# Patient Record
Sex: MALE | Race: WHITE | NOT HISPANIC OR LATINO | Employment: FULL TIME | URBAN - METROPOLITAN AREA
[De-identification: names, ages, dates, MRNs, and addresses within clinical notes are randomized per-mention and may not be internally consistent; named-entity substitution may affect disease eponyms.]

---

## 2017-01-27 ENCOUNTER — TRANSCRIBE ORDERS (OUTPATIENT)
Dept: ADMINISTRATIVE | Facility: HOSPITAL | Age: 67
End: 2017-01-27

## 2017-01-27 DIAGNOSIS — M54.2 NECK PAIN: Primary | ICD-10-CM

## 2017-01-27 DIAGNOSIS — R51.9 SEVERE FRONTAL HEADACHES: Primary | ICD-10-CM

## 2017-02-02 ENCOUNTER — HOSPITAL ENCOUNTER (OUTPATIENT)
Dept: CT IMAGING | Facility: HOSPITAL | Age: 67
Discharge: HOME | End: 2017-02-02
Admitting: INTERNAL MEDICINE

## 2017-02-02 ENCOUNTER — HOSPITAL ENCOUNTER (OUTPATIENT)
Dept: INFUSION THERAPY | Facility: HOSPITAL | Age: 67
End: 2017-02-02

## 2017-02-02 DIAGNOSIS — M54.2 NECK PAIN: ICD-10-CM

## 2017-02-02 DIAGNOSIS — R51.9 SEVERE FRONTAL HEADACHES: ICD-10-CM

## 2017-02-02 DIAGNOSIS — T78.40XA ALLERGIC REACTION, INITIAL ENCOUNTER: Primary | ICD-10-CM

## 2017-02-02 PROCEDURE — 0 IOPAMIDOL PER 1 ML: Performed by: INTERNAL MEDICINE

## 2017-02-02 PROCEDURE — 70491 CT SOFT TISSUE NECK W/DYE: CPT

## 2017-02-02 PROCEDURE — 70470 CT HEAD/BRAIN W/O & W/DYE: CPT

## 2017-02-02 PROCEDURE — 96374 THER/PROPH/DIAG INJ IV PUSH: CPT

## 2017-02-02 PROCEDURE — 25010000002 DIPHENHYDRAMINE PER 50 MG: Performed by: RADIOLOGY

## 2017-02-02 RX ADMIN — IOPAMIDOL 100 ML: 755 INJECTION, SOLUTION INTRAVENOUS at 15:30

## 2017-02-02 RX ADMIN — DIPHENHYDRAMINE HYDROCHLORIDE 50 MG: 50 INJECTION INTRAMUSCULAR; INTRAVENOUS at 15:03

## 2017-02-12 RX ORDER — ISOSORBIDE MONONITRATE 30 MG/1
TABLET, EXTENDED RELEASE ORAL
Qty: 90 TABLET | Refills: 0 | Status: SHIPPED | OUTPATIENT
Start: 2017-02-12 | End: 2017-05-12 | Stop reason: SDUPTHER

## 2017-03-20 ENCOUNTER — OFFICE VISIT (OUTPATIENT)
Dept: CARDIOLOGY | Facility: CLINIC | Age: 67
End: 2017-03-20

## 2017-03-20 VITALS
HEART RATE: 90 BPM | DIASTOLIC BLOOD PRESSURE: 70 MMHG | WEIGHT: 200.2 LBS | SYSTOLIC BLOOD PRESSURE: 120 MMHG | HEIGHT: 72 IN | BODY MASS INDEX: 27.12 KG/M2

## 2017-03-20 DIAGNOSIS — I25.10 MILD CORONARY ARTERY DISEASE: Primary | ICD-10-CM

## 2017-03-20 DIAGNOSIS — I73.9 ANGIOPATHY, PERIPHERAL (HCC): ICD-10-CM

## 2017-03-20 DIAGNOSIS — E78.5 HYPERLIPIDEMIA, UNSPECIFIED HYPERLIPIDEMIA TYPE: ICD-10-CM

## 2017-03-20 PROCEDURE — 99214 OFFICE O/P EST MOD 30 MIN: CPT | Performed by: INTERNAL MEDICINE

## 2017-03-20 PROCEDURE — 93000 ELECTROCARDIOGRAM COMPLETE: CPT | Performed by: INTERNAL MEDICINE

## 2017-03-20 RX ORDER — IBUPROFEN 200 MG
200 TABLET ORAL EVERY 6 HOURS
Status: ON HOLD | COMMUNITY
End: 2019-05-20

## 2017-03-20 RX ORDER — GABAPENTIN 300 MG/1
300 CAPSULE ORAL 2 TIMES DAILY
COMMUNITY
Start: 2017-02-23 | End: 2020-03-09 | Stop reason: ALTCHOICE

## 2017-03-20 RX ORDER — MINOXIDIL 2 %
1 SOLUTION, NON-ORAL TOPICAL DAILY
COMMUNITY
End: 2019-05-22 | Stop reason: HOSPADM

## 2017-03-20 NOTE — PROGRESS NOTES
Date of Office Visit: 2017  Encounter Provider: Ani Kam MD  Place of Service: [facility] CARDIOLOGY  Patient Name: Derik Borja  :1950      Patient ID:  Derik Borja is a 66 y.o. male is here for  followup for CAD and PAD.         History of Present Illness    When we first met, he was having progressive difficulty with his legs  when he is walking. He also had to stop because of severe burning and aching in his  legs, as well as significant dyspnea with exertion which seems to be  progressive and definitely gets better with rest. He was found to have COPD on a chest x-ray.     With his complaints, we went ahead and set him up for a 2-D echocardiogram with Doppler  done at [facility] on 2013. This showed ejection fraction of  55-60%, aortic valve calcification, and trace mitral insufficiency. He had a Lexiscan and  stress nuclear perfusion study which showed a small amount of inferior ischemia. Because  of his symptoms of claudication and his positive testing showing moderate occlusive  disease, I did send him to see Dr. Brambila in consultation. He saw him on 2013, and set  him for an angiogram which was done on 2013. He did the coronary angiogram which  showed 30% diffuse left anterior descending stenosis, focal 70% large first diagonal  stenosis, 30% mid circumflex stenosis, and 30% right coronary artery stenosis. The left  main coronary artery was short. The right coronary artery was dominant. His abdominal  aorta was smooth and no significant stenosis. The left renal artery was single with a  40-50% proximal stenosis. I do not see a comment here about the right renal artery. The  common iliac arteries had significant disease on the left. The left had a focal 90%  stenosis. The right common iliac had a 30% stenosis. The right internal iliac artery was  patent. The left internal iliac artery was occluded. The superficial femoral  artery was  occluded on the left along its entire length and reconstituted with collaterals at the  profunda. The common femoral artery had moderate, diffuse disease. There was brisk,  three-vessel runoff to the foot. The right common femoral artery had mild disease. The  right superficial femoral artery was occluded for its entire length and reconstituted with  brisk profunda collaterals, and there was three-vessel runoff to the foot. He then  underwent stenting of the left common iliac artery receiving an 8.0 mm x 39 mm Omnilink  Elite stent. His SFA occlusions have been treated medically.   He is on Plavix 75 mg daily and tolerating it well     He did have a lymph node removed in his neck in November 2012.   His wife Holley is a cousin to Merrick Mccloud, who are also my patients.   He has been smoking a pack a day for over 20 years. He and his wife are both in the  process of trying to quit.      He saw Dr. Brambila on 07/15/2013. He has residual claudication in the right leg. Dr. Brambila said that that can only fixed with a femoral popliteal bypass surgery. The patient is  not interested in that at this time. He does continue to smoke.       We discussed the importance of smoking cessation.       He is here today for follow-up.  He continues to have some claudication of his legs.  He has some exertional chest tightness at times but it is not daily.  It seems intermittent and rather hit or miss.  He does not increase his Imdur dosing because he has had dizziness associated with Imdur.  He has had no falls and no tachycardia.  He does continue to smoke a half a pack of cigarettes a day and is developing venous varicosities of his legs.  With his claudication, he knows that the only solution at this point is leg bypass and he would rather just continue to treat it medically.  When it comes on, he just slows down.  He has been using some ibuprofen because he is having neck pain.  He also has some numbness in his hands  and feet.  Dr. Oliveira suggested maybe tramadol, and I would rather have him use tramadol than ibuprofen because of his cardiac issues.  Overall, he feels like things are mostly stable.            Past Medical History   Diagnosis Date   • Angina pectoris    • CAD (coronary artery disease)      3/13   • DOMINGUEZ (dyspnea on exertion)    • Hyperlipidemia    • Leg pain    • PAD (peripheral artery disease)    • PVC (premature ventricular contraction)    • PVD (peripheral vascular disease)          Past Surgical History   Procedure Laterality Date   • Iliac artery stent  03/2013   • Shoulder surgery  2013       Current Outpatient Prescriptions on File Prior to Visit   Medication Sig Dispense Refill   • Ascorbic Acid (VITAMIN C PO) Take  by mouth.     • aspirin 325 MG tablet Take 325 mg by mouth daily.     • atorvastatin (LIPITOR) 40 MG tablet Take  by mouth.     • clopidogrel (PLAVIX) 75 MG tablet TAKE ONE TABLET BY MOUTH ONCE DAILY 90 tablet 0   • isosorbide mononitrate (IMDUR) 30 MG 24 hr tablet TAKE ONE TABLET BY MOUTH ONCE DAILY 90 tablet 0   • Multiple Vitamin (MULTIVITAMIN) tablet Take 1 tablet by mouth daily.     • [DISCONTINUED] Acetaminophen (TYLENOL PO) Take  by mouth.       No current facility-administered medications on file prior to visit.        Social History     Social History   • Marital status:      Spouse name: N/A   • Number of children: N/A   • Years of education: N/A     Occupational History   • Not on file.     Social History Main Topics   • Smoking status: Current Every Day Smoker     Packs/day: 0.50   • Smokeless tobacco: Not on file   • Alcohol use Yes   • Drug use: Not on file   • Sexual activity: Not on file     Other Topics Concern   • Not on file     Social History Narrative           Review of Systems   Constitution: Negative.   HENT: Negative for congestion and headaches.    Eyes: Negative for vision loss in left eye and vision loss in right eye.   Respiratory: Negative.  Negative for  "cough, hemoptysis, shortness of breath, sleep disturbances due to breathing, snoring, sputum production and wheezing.    Endocrine: Negative.    Hematologic/Lymphatic: Negative.    Skin: Negative for poor wound healing and rash.   Musculoskeletal: Negative for falls, gout, muscle cramps and myalgias.   Gastrointestinal: Negative for abdominal pain, diarrhea, dysphagia, hematemesis, melena, nausea and vomiting.   Neurological: Negative for excessive daytime sleepiness, dizziness, light-headedness, loss of balance, seizures and vertigo.   Psychiatric/Behavioral: Negative for depression and substance abuse. The patient is not nervous/anxious.        Procedures    ECG 12 Lead  Date/Time: 3/20/2017 10:45 AM  Performed by: PILY WHARTON  Authorized by: PILY WHARTON   Comparison: compared with previous ECG   Similar to previous ECG  Rhythm: sinus rhythm  Clinical impression: normal ECG               Objective:      Vitals:    03/20/17 1040   BP: 120/70   BP Location: Right arm   Patient Position: Sitting   Pulse: 90   Weight: 200 lb 3.2 oz (90.8 kg)   Height: 72\" (182.9 cm)     Body mass index is 27.15 kg/(m^2).    Physical Exam   Constitutional: He is oriented to person, place, and time. He appears well-developed and well-nourished. No distress.   HENT:   Head: Normocephalic and atraumatic.   Eyes: Conjunctivae are normal. No scleral icterus.   Neck: Neck supple. No JVD present. Carotid bruit is not present. No thyromegaly present.   Cardiovascular: Normal rate, regular rhythm, S1 normal, S2 normal, normal heart sounds and intact distal pulses.   No extrasystoles are present. PMI is not displaced.  Exam reveals no gallop.    No murmur heard.  Pulses:       Carotid pulses are 2+ on the right side, and 2+ on the left side.       Radial pulses are 2+ on the right side, and 2+ on the left side.        Dorsalis pedis pulses are 2+ on the right side, and 2+ on the left side.        Posterior tibial pulses are 2+ " on the right side, and 2+ on the left side.   Pulmonary/Chest: Effort normal and breath sounds normal. No respiratory distress. He has no wheezes. He has no rhonchi. He has no rales. He exhibits no tenderness.   Abdominal: Soft. Bowel sounds are normal. He exhibits no distension, no abdominal bruit and no mass. There is no tenderness.   Musculoskeletal: He exhibits no edema or deformity.   Lymphadenopathy:     He has no cervical adenopathy.   Neurological: He is alert and oriented to person, place, and time. No cranial nerve deficit.   Skin: Skin is warm and dry. No rash noted. He is not diaphoretic. No cyanosis. No pallor. Nails show no clubbing.   Psychiatric: He has a normal mood and affect. Judgment normal.   Vitals reviewed.      Lab Review:       Assessment:      Diagnosis Plan   1. Mild coronary artery disease     2. Angiopathy, peripheral     3. Hyperlipidemia, unspecified hyperlipidemia type       1. Claudication with abnormal peripheral vascular testing with both rest and exercise  ankle brachial indices. s/p left iliac stent. Claudication better on left, still present on  right. Only treatment for right is fem-pop bypass. He does not want at this time. Advised exercise  and smoking cessation.  2. Diagonal stenosis, 3/13. Treated medically, no angina or CHF. Stop plavix and try brilinta 60mg BID.   3. Hyperlipidemia. Continue atorvastatin 20 mg at night. Check labs with Dr. Oliveira.   4. Tobacco use. I advised him to stop smoking.   5. Varicose veins in LLE, advised compression stockings.     Plan:       See back in 1 year.    Coronary Artery Disease  Assessment  • The patient has no angina    Subjective - Objective  • Current antiplatelet therapy includes aspirin 81 mg

## 2017-03-28 ENCOUNTER — DOCUMENTATION (OUTPATIENT)
Dept: CARDIOLOGY | Facility: CLINIC | Age: 67
End: 2017-03-28

## 2017-03-28 RX ORDER — TICAGRELOR 60 MG/1
TABLET ORAL
Qty: 180 TABLET | Refills: 0 | Status: CANCELLED | OUTPATIENT
Start: 2017-03-28

## 2017-05-12 RX ORDER — ISOSORBIDE MONONITRATE 30 MG/1
TABLET, EXTENDED RELEASE ORAL
Qty: 90 TABLET | Refills: 3 | Status: SHIPPED | OUTPATIENT
Start: 2017-05-12 | End: 2018-05-15 | Stop reason: SDUPTHER

## 2017-05-22 ENCOUNTER — TELEPHONE (OUTPATIENT)
Dept: CARDIOLOGY | Facility: CLINIC | Age: 67
End: 2017-05-22

## 2017-05-22 RX ORDER — CLOPIDOGREL BISULFATE 75 MG/1
75 TABLET ORAL DAILY
Qty: 90 TABLET | Refills: 3 | Status: SHIPPED | OUTPATIENT
Start: 2017-05-22 | End: 2018-06-19 | Stop reason: SDUPTHER

## 2017-05-22 RX ORDER — CLOPIDOGREL BISULFATE 75 MG/1
TABLET ORAL
Qty: 90 TABLET | Refills: 0 | OUTPATIENT
Start: 2017-05-22

## 2018-03-23 ENCOUNTER — OFFICE VISIT (OUTPATIENT)
Dept: CARDIOLOGY | Facility: CLINIC | Age: 68
End: 2018-03-23

## 2018-03-23 VITALS
HEART RATE: 69 BPM | HEIGHT: 72 IN | DIASTOLIC BLOOD PRESSURE: 80 MMHG | WEIGHT: 216.4 LBS | BODY MASS INDEX: 29.31 KG/M2 | SYSTOLIC BLOOD PRESSURE: 140 MMHG

## 2018-03-23 DIAGNOSIS — I25.10 MILD CORONARY ARTERY DISEASE: Primary | ICD-10-CM

## 2018-03-23 DIAGNOSIS — E78.5 HYPERLIPIDEMIA, UNSPECIFIED HYPERLIPIDEMIA TYPE: ICD-10-CM

## 2018-03-23 DIAGNOSIS — I73.9 ANGIOPATHY, PERIPHERAL (HCC): ICD-10-CM

## 2018-03-23 DIAGNOSIS — I49.3 BEAT, PREMATURE VENTRICULAR: ICD-10-CM

## 2018-03-23 PROCEDURE — 93000 ELECTROCARDIOGRAM COMPLETE: CPT | Performed by: INTERNAL MEDICINE

## 2018-03-23 PROCEDURE — 99214 OFFICE O/P EST MOD 30 MIN: CPT | Performed by: INTERNAL MEDICINE

## 2018-03-23 RX ORDER — ASPIRIN 325 MG
325 TABLET ORAL DAILY
COMMUNITY

## 2018-03-23 RX ORDER — MELOXICAM 15 MG/1
15 TABLET ORAL DAILY
COMMUNITY
Start: 2018-02-26

## 2018-03-23 RX ORDER — CYCLOBENZAPRINE HCL 10 MG
10 TABLET ORAL NIGHTLY
COMMUNITY
Start: 2018-03-18

## 2018-03-23 NOTE — PROGRESS NOTES
Date of Office Visit: 2018  Encounter Provider: Ani Kam MD  Place of Service: [facility] CARDIOLOGY  Patient Name: Derik Borja  :1950      Patient ID:  Derik Borja is a 67 y.o. male is here for  followup for CAD, PAD.         History of Present Illness    When we first met, he was having progressive difficulty with his legs  when he is walking. He also had to stop because of severe burning and aching in his  legs, as well as significant dyspnea with exertion which seems to be  progressive and definitely gets better with rest. He was found to have COPD on a chest x-ray.      With his complaints, he had a 2-D echocardiogram with Doppler  done at [facility] on 2013. This showed ejection fraction of  55-60%, aortic valve calcification, and trace mitral insufficiency. He had a Lexiscan and  stress nuclear perfusion study which showed a small amount of inferior ischemia. Because  of his symptoms of claudication and his positive testing showing moderate occlusive  Disease.  He saw Dr. Brambila in consultation. He saw him on 2013, and set  him for an angiogram which was done on 2013. He did the coronary angiogram which  showed 30% diffuse left anterior descending stenosis, focal 70% large first diagonal  stenosis, 30% mid circumflex stenosis, and 30% right coronary artery stenosis. The left  main coronary artery was short. The right coronary artery was dominant. His abdominal  aorta was smooth and no significant stenosis. The left renal artery was single with a  40-50% proximal stenosis. I do not see a comment here about the right renal artery. The  common iliac arteries had significant disease on the left. The left had a focal 90%  stenosis. The right common iliac had a 30% stenosis. The right internal iliac artery was  patent. The left internal iliac artery was occluded. The superficial femoral artery was  occluded on the left along its  entire length and reconstituted with collaterals at the  profunda. The common femoral artery had moderate, diffuse disease. There was brisk,  three-vessel runoff to the foot. The right common femoral artery had mild disease. The  right superficial femoral artery was occluded for its entire length and reconstituted with  brisk profunda collaterals, and there was three-vessel runoff to the foot. He then  underwent stenting of the left common iliac artery receiving an 8.0 mm x 39 mm Omnilink  Elite stent. His SFA occlusions have been treated medically.        He did have a lymph node removed in his neck in November 2012.   His wife Holley is a cousin to Merrick Mccloud, who are also my patients.   He has been smoking a pack a day for over 20 years. He and his wife are both in the  process of trying to quit.      He saw Dr. Brambila on 07/15/2013. He has residual claudication in the right leg. Dr. Brambila said that that can only fixed with a femoral popliteal bypass surgery. The patient is  not interested in that at this time. He does continue to smoke.      He had labs done every 2018 with Dr. Oliveira.  His LDL was 70.  He reported his liver panel to be normal and his HDL low.  He continues to smoke only about 5-6 cigarettes a day.  He had quit entirely but had some stress and restarted.  He has no exertional chest tightness or pressure.  He's been having vertigo when he looks up or looks the side.  He describes as the room spinning.  It's never been sustained.  He's had no think your falls due to it.  He doesn't feels heart racing or skipping.  He's having a lot of hip pain from arthritis.  He really has very little claudication.  He denies nausea or vomiting.  He is tolerating medications well.  He is a  for [employer] apartments.  He stays very active.          Past Medical History:   Diagnosis Date   • Angina pectoris    • CAD (coronary artery disease)     3/13   • DOMINGUEZ (dyspnea on exertion)    •  Hyperlipidemia    • Leg pain    • PAD (peripheral artery disease)    • PVC (premature ventricular contraction)    • PVD (peripheral vascular disease)          Past Surgical History:   Procedure Laterality Date   • ILIAC ARTERY STENT  03/2013   • SHOULDER SURGERY  2013       Current Outpatient Prescriptions on File Prior to Visit   Medication Sig Dispense Refill   • Ascorbic Acid (VITAMIN C PO) Take  by mouth.     • atorvastatin (LIPITOR) 40 MG tablet Take  by mouth.     • clopidogrel (PLAVIX) 75 MG tablet Take 1 tablet by mouth Daily. 90 tablet 3   • gabapentin (NEURONTIN) 300 MG capsule Take 1 capsule by mouth 2 (Two) Times a Day.     • ibuprofen (ADVIL,MOTRIN) 200 MG tablet Take 200 mg by mouth Every 6 (Six) Hours.     • isosorbide mononitrate (IMDUR) 30 MG 24 hr tablet TAKE ONE TABLET BY MOUTH ONCE DAILY 90 tablet 3   • Multiple Vitamin (MULTIVITAMIN) tablet Take 1 tablet by mouth daily.     • Omega-3 Fatty Acids ([employer] FISH OIL) 1200 MG capsule Take 1 tablet by mouth 2 (Two) Times a Day.     • [DISCONTINUED] aspirin 81 MG tablet Take 1 tablet by mouth Daily.     • [DISCONTINUED] ticagrelor (BRILINTA) 60 MG tablet tablet Take 1 tablet by mouth 2 (Two) Times a Day. 180 tablet 3     No current facility-administered medications on file prior to visit.        Social History     Social History   • Marital status:      Spouse name: N/A   • Number of children: N/A   • Years of education: N/A     Occupational History   • Not on file.     Social History Main Topics   • Smoking status: Current Every Day Smoker     Packs/day: 0.50   • Smokeless tobacco: Never Used   • Alcohol use Yes   • Drug use: Unknown   • Sexual activity: Not on file     Other Topics Concern   • Not on file     Social History Narrative   • No narrative on file           Review of Systems   Constitution: Negative.   HENT: Negative for congestion.    Eyes: Negative for vision loss in left eye and vision loss in right eye.   Respiratory: Negative.   "Negative for cough, hemoptysis, shortness of breath, sleep disturbances due to breathing, snoring, sputum production and wheezing.    Endocrine: Negative.    Hematologic/Lymphatic: Negative.    Skin: Negative for poor wound healing and rash.   Musculoskeletal: Negative for falls, gout, muscle cramps and myalgias.   Gastrointestinal: Negative for abdominal pain, diarrhea, dysphagia, hematemesis, melena, nausea and vomiting.   Neurological: Negative for excessive daytime sleepiness, dizziness, headaches, light-headedness, loss of balance, seizures and vertigo.   Psychiatric/Behavioral: Negative for depression and substance abuse. The patient is not nervous/anxious.        Procedures    ECG 12 Lead  Date/Time: 3/23/2018 9:18 AM  Performed by: PILY WHARTON  Authorized by: PILY WHARTON   Comparison: compared with previous ECG   Similar to previous ECG  Rhythm: sinus rhythm  Ectopy: atrial premature contractions  Clinical impression: non-specific ECG                Objective:      Vitals:    03/23/18 0909   BP: 140/80   BP Location: Right arm   Patient Position: Sitting   Pulse: 69   Weight: 98.2 kg (216 lb 6.4 oz)   Height: 182.9 cm (72\")     Body mass index is 29.35 kg/m².    Physical Exam   Constitutional: He is oriented to person, place, and time. He appears well-developed and well-nourished. No distress.   HENT:   Head: Normocephalic and atraumatic.   Eyes: Conjunctivae are normal. No scleral icterus.   Neck: Neck supple. No JVD present. Carotid bruit is not present. No thyromegaly present.   Cardiovascular: Normal rate, regular rhythm, S1 normal, S2 normal, normal heart sounds and intact distal pulses.   No extrasystoles are present. PMI is not displaced.  Exam reveals no gallop.    No murmur heard.  Pulses:       Carotid pulses are 2+ on the right side, and 2+ on the left side.       Radial pulses are 2+ on the right side, and 2+ on the left side.        Dorsalis pedis pulses are 2+ on the right " side, and 2+ on the left side.        Posterior tibial pulses are 2+ on the right side, and 2+ on the left side.   Pulmonary/Chest: Effort normal and breath sounds normal. No respiratory distress. He has no wheezes. He has no rhonchi. He has no rales. He exhibits no tenderness.   Abdominal: Soft. Bowel sounds are normal. He exhibits no distension, no abdominal bruit and no mass. There is no tenderness.   Musculoskeletal: He exhibits no edema or deformity.   Lymphadenopathy:     He has no cervical adenopathy.   Neurological: He is alert and oriented to person, place, and time. No cranial nerve deficit.   Skin: Skin is warm and dry. No rash noted. He is not diaphoretic. No cyanosis. No pallor. Nails show no clubbing.   Psychiatric: He has a normal mood and affect. Judgment normal.   Vitals reviewed.      Lab Review:       Assessment:      Diagnosis Plan   1. Mild coronary artery disease     2. Angiopathy, peripheral     3. Hyperlipidemia, unspecified hyperlipidemia type     4. Beat, premature ventricular       1. PAD, s/p left iliac stent. Claudication better on left, still present on  right. Only treatment for right is fem-pop bypass. He does not want at this time. Advised exercise  and smoking cessation.  2. Diagonal stenosis, 3/13. Treated medically, no angina or CHF. On asa and plavix.   3. Hyperlipidemia. Continue atorvastatin 20 mg at night. Has labs with Dr. Oliveira.   4. Tobacco use. I advised him to stop smoking.   5. Varicose veins in LLE, advised compression stockings.     Plan:       F/u in 1 year, no changes.      Coronary Artery Disease  Assessment  • The patient has no angina    Subjective - Objective  • Current antiplatelet therapy includes aspirin 81 mg and clopidogrel 75 mg

## 2018-05-15 RX ORDER — ISOSORBIDE MONONITRATE 30 MG/1
TABLET, EXTENDED RELEASE ORAL
Qty: 90 TABLET | Refills: 2 | Status: SHIPPED | OUTPATIENT
Start: 2018-05-15 | End: 2019-02-07 | Stop reason: SDUPTHER

## 2018-06-20 RX ORDER — CLOPIDOGREL BISULFATE 75 MG/1
TABLET ORAL
Qty: 90 TABLET | Refills: 1 | Status: SHIPPED | OUTPATIENT
Start: 2018-06-20 | End: 2018-12-09 | Stop reason: SDUPTHER

## 2018-12-10 RX ORDER — CLOPIDOGREL BISULFATE 75 MG/1
TABLET ORAL
Qty: 90 TABLET | Refills: 1 | Status: SHIPPED | OUTPATIENT
Start: 2018-12-10 | End: 2019-06-03 | Stop reason: SDUPTHER

## 2019-02-07 RX ORDER — ISOSORBIDE MONONITRATE 30 MG/1
TABLET, EXTENDED RELEASE ORAL
Qty: 90 TABLET | Refills: 0 | Status: SHIPPED | OUTPATIENT
Start: 2019-02-07 | End: 2019-05-23 | Stop reason: SDUPTHER

## 2019-02-25 ENCOUNTER — HOSPITAL ENCOUNTER (OUTPATIENT)
Dept: CT IMAGING | Facility: HOSPITAL | Age: 69
Discharge: HOME | End: 2019-02-25
Admitting: CLINICAL NURSE SPECIALIST

## 2019-02-25 ENCOUNTER — APPOINTMENT (OUTPATIENT)
Dept: OTHER | Facility: HOSPITAL | Age: 69
End: 2019-02-25

## 2019-02-25 DIAGNOSIS — Z12.2 SCREENING FOR MALIGNANT NEOPLASM OF RESPIRATORY ORGAN: Primary | ICD-10-CM

## 2019-02-25 DIAGNOSIS — Z12.2 SCREENING FOR MALIGNANT NEOPLASM OF RESPIRATORY ORGAN: ICD-10-CM

## 2019-02-25 DIAGNOSIS — F17.210 SMOKING GREATER THAN 30 PACK YEARS: ICD-10-CM

## 2019-02-25 PROCEDURE — G0297 LDCT FOR LUNG CA SCREEN: HCPCS

## 2019-03-29 ENCOUNTER — OFFICE VISIT (OUTPATIENT)
Dept: CARDIOLOGY | Facility: CLINIC | Age: 69
End: 2019-03-29

## 2019-03-29 VITALS
SYSTOLIC BLOOD PRESSURE: 130 MMHG | WEIGHT: 212.2 LBS | HEART RATE: 78 BPM | HEIGHT: 72 IN | DIASTOLIC BLOOD PRESSURE: 70 MMHG | BODY MASS INDEX: 28.74 KG/M2

## 2019-03-29 DIAGNOSIS — I20.9 ANGINA PECTORIS (HCC): Primary | ICD-10-CM

## 2019-03-29 DIAGNOSIS — E78.5 HYPERLIPIDEMIA, UNSPECIFIED HYPERLIPIDEMIA TYPE: ICD-10-CM

## 2019-03-29 DIAGNOSIS — I70.90 ARTERIAL VASCULAR DISEASE: ICD-10-CM

## 2019-03-29 PROCEDURE — 99214 OFFICE O/P EST MOD 30 MIN: CPT | Performed by: NURSE PRACTITIONER

## 2019-03-29 PROCEDURE — 93000 ELECTROCARDIOGRAM COMPLETE: CPT | Performed by: NURSE PRACTITIONER

## 2019-03-29 RX ORDER — TRAMADOL HYDROCHLORIDE 50 MG/1
1 TABLET ORAL EVERY 8 HOURS PRN
COMMUNITY
Start: 2019-03-28

## 2019-03-29 NOTE — PROGRESS NOTES
Subjective:     Encounter Date:03/29/2019      Patient ID: Derik Borja is a 68 y.o. male.    Chief Complaint:yearlly follow up, CAD  History of Present Illness  Mr. Borja is a new patient to me and I have reviewed his past medical history.  He is a patient of Dr. Kma.    History brought forward for continuity:  When we first met, he was having progressive difficulty with his legs  when he is walking. He also had to stop because of severe burning and aching in his  legs, as well as significant dyspnea with exertion which seems to be  progressive and definitely gets better with rest. He was found to have COPD on a chest x-ray.      With his complaints, he had a 2-D echocardiogram with Doppler  done at [facility] on 02/12/2013. This showed ejection fraction of  55-60%, aortic valve calcification, and trace mitral insufficiency. He had a Lexiscan and  stress nuclear perfusion study which showed a small amount of inferior ischemia. Because  of his symptoms of claudication and his positive testing showing moderate occlusive  Disease.  He saw Dr. Brambila in consultation. He saw him on 02/18/2013, and set  him for an angiogram which was done on 03/12/2013. He did the coronary angiogram which  showed 30% diffuse left anterior descending stenosis, focal 70% large first diagonal  stenosis, 30% mid circumflex stenosis, and 30% right coronary artery stenosis. The left  main coronary artery was short. The right coronary artery was dominant. His abdominal  aorta was smooth and no significant stenosis. The left renal artery was single with a  40-50% proximal stenosis. I do not see a comment here about the right renal artery. The  common iliac arteries had significant disease on the left. The left had a focal 90%  stenosis. The right common iliac had a 30% stenosis. The right internal iliac artery was  patent. The left internal iliac artery was occluded. The superficial femoral artery was  occluded on the left  along its entire length and reconstituted with collaterals at the  profunda. The common femoral artery had moderate, diffuse disease. There was brisk,  three-vessel runoff to the foot. The right common femoral artery had mild disease. The  right superficial femoral artery was occluded for its entire length and reconstituted with  brisk profunda collaterals, and there was three-vessel runoff to the foot. He then  underwent stenting of the left common iliac artery receiving an 8.0 mm x 39 mm Omnilink  Elite stent. His SFA occlusions have been treated medically.         He did have a lymph node removed in his neck in November 2012.   His wife Holley is a cousin to Merrick Mccloud, who are also my patients.   He has been smoking a pack a day for over 20 years. He and his wife are both in the  process of trying to quit.      He saw Dr. Brambila on 07/15/2013. He has residual claudication in the right leg. Dr. Brambila said that that can only fixed with a femoral popliteal bypass surgery. The patient is  not interested in that at this time. He does continue to smoke.         He presents today, 3/29/19 for his yearly cardiac evaluation.  He denies any palpitations or edema.  He complains of shortness of and recently had a lung scan on 2/25/19 that showed there are central emphysematous changes throughout the upper lobes.  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  He states in the afternoons he will sometimes have chest pain that is accompanied with shortness of air.  It does not radiate but it is in his precordial chest area.  He still is bothered with claudication in his legs that is worse when he has to walk to all of the buildings when he is making rounds.  He also states that he has been put on tramadol for his arthritis and has been getting charley horses at night.  He says Dr. Oliveira said this may be due to the tramadol.  He states he has been eating  more bananas.  Dr. Oliveira has been checking his labs.  His biggest complaint with his legs is worsening arthritis in his hips.  He will occasionally had periods of lightheadedness.  He is fatiguing more easily.  He continues to smoke a half a pack a day.    The following portions of the patient's history were reviewed and updated as appropriate: allergies, current medications, past family history, past medical history, past social history, past surgical history and problem list.    Review of Systems   Constitution: Negative for weakness and malaise  Fatigue  HENT: Negative for congestion, hoarse voice and sore throat.    Eyes: Negative for blurred vision, double vision, photophobia, vision loss in left eye and vision loss in right eye.   Cardiovascular: Negative for  irregular heartbeat, leg swelling, near-syncope, orthopnea, palpitations, paroxysmal nocturnal dyspnea and syncope. chest pain, dyspnea on exertion  Respiratory: Negative for cough, hemoptysis, , sleep disturbances due to breathing, snoring, sputum production and wheezing.  shortness of breath  Endocrine: Negative.    Hematologic/Lymphatic: Does not bruise/bleed easily.   Skin: Negative for color change, dry skin, poor wound healing and rash.   Musculoskeletal: Negative for back pain, falls, gout, joint pain, joint swelling,  and muscle weakness. muscle cramps, hip pain  Gastrointestinal: Negative for abdominal pain, constipation, diarrhea, dysphagia, melena, nausea and vomiting.   Neurological: Negative for excessive daytime sleepiness, dizziness, headaches, light-headedness, loss of balance, numbness, paresthesias, seizures and vertigo.   Psychiatric/Behavioral: Negative for depression and substance abuse. The patient is not nervous/anxious.        ECG 12 Lead  Date/Time: 3/29/2019 12:10 PM  Performed by: May Grant APRN  Authorized by: Mya Grant APRN   Comparison: compared with previous ECG from 3/23/2018  Rhythm: sinus rhythm  Ectopy:  atrial premature contractions  Rate: normal  Conduction: conduction normal  ST Segments: ST segments normal  T Waves: T waves normal  QRS axis: normal    Clinical impression: non-specific ECG               Objective:         Physical Exam   Constitutional: He is oriented to person, place, and time. Vital signs are normal. He appears well-developed and well-nourished. No distress.   HENT:   Head: Normocephalic and atraumatic.   Right Ear: Hearing normal.   Left Ear: Hearing normal.   Eyes: Conjunctivae and lids are normal.   Neck: Normal range of motion. Neck supple. No JVD present. Carotid bruit is not present. No thyromegaly present.   Cardiovascular: Normal rate, regular rhythm, S1 normal, S2 normal, normal heart sounds and intact distal pulses.  PMI is not displaced.  Exam reveals no gallop.    No murmur heard.  Pulses:       Carotid pulses are 2+ on the right side, and 2+ on the left side.       Radial pulses are 2+ on the right side, and 2+ on the left side.        Dorsalis pedis pulses are 2+ on the right side, and 2+ on the left side.        Posterior tibial pulses are 2+ on the right side, and 2+ on the left side.   Pulmonary/Chest: Effort normal and breath sounds normal. No respiratory distress. He has no wheezes. He has no rhonchi. He has no rales. He exhibits no tenderness.   Abdominal: Soft. Normal appearance and bowel sounds are normal. He exhibits no distension, no abdominal bruit and no mass. There is no tenderness.   Musculoskeletal: Normal range of motion.   Exhibits no edema or deformity   Lymphadenopathy:     He has no cervical adenopathy.   Neurological: He is alert and oriented to person, place, and time. No cranial nerve deficit. Coordination and gait normal.   Oriented to person, place and time.   Skin: Skin is warm, dry and intact. No rash noted. He is not diaphoretic. No cyanosis. Nails show no clubbing.   Psychiatric: He has a normal mood and affect. His speech is normal and behavior is  "normal. Judgment and thought content normal. Cognition and memory are normal.     Vitals:    03/29/19 1001   BP: 130/70   BP Location: Left arm   Patient Position: Sitting   Pulse: 78   Weight: 96.3 kg (212 lb 3.2 oz)   Height: 182.9 cm (72.01\")           Lab Review:       Assessment:          Diagnosis Plan   1. Angina pectoris (CMS/HCC)  Stress Test With Myocardial Perfusion One Day   2. Arterial vascular disease     3. Hyperlipidemia, unspecified hyperlipidemia type            Plan:       1.  Angina pectoris - chest pain with exertion, SOA, easily fatigued - check lexiscan  2.  Arterial vascular disease - still some claudication with exertional walking.  On  and plavix.  S/p left iliac stent.  Only treatment for right is a fem-pop bypass.  He does not want this at this time.  Advised exercise and smoking cessation.  3.  Hyperlipidemia - continue lipid lowering therapy.  Labs 2/19 with Dr. Oliveira, reviewed.  4.  Diagonal stenosis, 3/13.  Treated medically.  Angina and SOA, check lexiscan  5.  Tobacco abuse - smoking 1/2 pack a day.  Advised to stop    Await lexiscan    NIURKA Holt      Current Outpatient Medications:   •  Ascorbic Acid (VITAMIN C PO), Take  by mouth., Disp: , Rfl:   •  aspirin 325 MG tablet, Take 325 mg by mouth Daily., Disp: , Rfl:   •  atorvastatin (LIPITOR) 40 MG tablet, Take  by mouth., Disp: , Rfl:   •  clopidogrel (PLAVIX) 75 MG tablet, TAKE 1 TABLET BY MOUTH ONCE DAILY, Disp: 90 tablet, Rfl: 1  •  cyclobenzaprine (FLEXERIL) 10 MG tablet, Take 1 tablet by mouth Daily., Disp: , Rfl:   •  Docusate Sodium (COLACE PO), Take 1 tablet by mouth As Needed., Disp: , Rfl:   •  gabapentin (NEURONTIN) 300 MG capsule, Take 1 capsule by mouth 2 (Two) Times a Day., Disp: , Rfl:   •  ibuprofen (ADVIL,MOTRIN) 200 MG tablet, Take 200 mg by mouth Every 6 (Six) Hours., Disp: , Rfl:   •  isosorbide mononitrate (IMDUR) 30 MG 24 hr tablet, TAKE 1 TABLET BY MOUTH ONCE DAILY, Disp: 90 tablet, Rfl: 0  •  " meloxicam (MOBIC) 7.5 MG tablet, Take 1 tablet by mouth Daily., Disp: , Rfl:   •  Multiple Vitamin (MULTIVITAMIN) tablet, Take 1 tablet by mouth daily., Disp: , Rfl:   •  Omega-3 Fatty Acids ([employer] FISH OIL) 1200 MG capsule, Take 1 tablet by mouth 2 (Two) Times a Day., Disp: , Rfl:   •  traMADol (ULTRAM) 50 MG tablet, Take 1 tablet by mouth As Needed., Disp: , Rfl:

## 2019-04-08 ENCOUNTER — HOSPITAL ENCOUNTER (OUTPATIENT)
Dept: NUCLEAR MEDICINE | Facility: HOSPITAL | Age: 69
Discharge: HOME | End: 2019-04-08

## 2019-04-08 ENCOUNTER — HOSPITAL ENCOUNTER (OUTPATIENT)
Dept: CARDIOLOGY | Facility: HOSPITAL | Age: 69
Discharge: HOME | End: 2019-04-08

## 2019-04-08 DIAGNOSIS — I20.9 ANGINA PECTORIS (HCC): ICD-10-CM

## 2019-04-08 LAB
BH CV NUCLEAR PRIOR STUDY: 3
BH CV STRESS BP STAGE 1: NORMAL
BH CV STRESS COMMENTS STAGE 1: NORMAL
BH CV STRESS DOSE REGADENOSON STAGE 1: 0.4
BH CV STRESS DURATION MIN STAGE 1: 0
BH CV STRESS DURATION SEC STAGE 1: 30
BH CV STRESS HR STAGE 1: 79
BH CV STRESS O2 STAGE 1: 96
BH CV STRESS PROTOCOL 1: NORMAL
BH CV STRESS RECOVERY BP: NORMAL MMHG
BH CV STRESS RECOVERY HR: 99 BPM
BH CV STRESS RECOVERY O2: 98 %
BH CV STRESS STAGE 1: 1
LV EF NUC BP: 64 %
MAXIMAL PREDICTED HEART RATE: 152 BPM
PERCENT MAX PREDICTED HR: 73.03 %
STRESS BASELINE BP: NORMAL MMHG
STRESS BASELINE HR: 81 BPM
STRESS O2 SAT REST: 96 %
STRESS PERCENT HR: 86 %
STRESS POST ESTIMATED WORKLOAD: 1 METS
STRESS POST EXERCISE DUR SEC: 30 SEC
STRESS POST O2 SAT PEAK: 99 %
STRESS POST PEAK BP: NORMAL MMHG
STRESS POST PEAK HR: 111 BPM
STRESS TARGET HR: 129 BPM

## 2019-04-08 PROCEDURE — A9500 TC99M SESTAMIBI: HCPCS | Performed by: NURSE PRACTITIONER

## 2019-04-08 PROCEDURE — 93017 CV STRESS TEST TRACING ONLY: CPT

## 2019-04-08 PROCEDURE — 78452 HT MUSCLE IMAGE SPECT MULT: CPT

## 2019-04-08 PROCEDURE — 0 TECHNETIUM SESTAMIBI: Performed by: NURSE PRACTITIONER

## 2019-04-08 PROCEDURE — 93016 CV STRESS TEST SUPVJ ONLY: CPT | Performed by: INTERNAL MEDICINE

## 2019-04-08 PROCEDURE — 93018 CV STRESS TEST I&R ONLY: CPT | Performed by: INTERNAL MEDICINE

## 2019-04-08 PROCEDURE — 25010000002 REGADENOSON 0.4 MG/5ML SOLUTION: Performed by: NURSE PRACTITIONER

## 2019-04-08 PROCEDURE — 78452 HT MUSCLE IMAGE SPECT MULT: CPT | Performed by: INTERNAL MEDICINE

## 2019-04-08 RX ADMIN — TECHNETIUM TC 99M SESTAMIBI 1 DOSE: 1 INJECTION INTRAVENOUS at 09:50

## 2019-04-08 RX ADMIN — TECHNETIUM TC 99M SESTAMIBI 1 DOSE: 1 INJECTION INTRAVENOUS at 07:50

## 2019-04-08 RX ADMIN — REGADENOSON 0.4 MG: 0.08 INJECTION, SOLUTION INTRAVENOUS at 09:50

## 2019-05-20 ENCOUNTER — HOSPITAL ENCOUNTER (OUTPATIENT)
Facility: HOSPITAL | Age: 69
Setting detail: OBSERVATION
Discharge: HOME | End: 2019-05-22
Attending: HOSPITALIST | Admitting: HOSPITALIST

## 2019-05-20 ENCOUNTER — APPOINTMENT (OUTPATIENT)
Dept: GENERAL RADIOLOGY | Facility: HOSPITAL | Age: 69
End: 2019-05-20

## 2019-05-20 DIAGNOSIS — J44.0 COPD (CHRONIC OBSTRUCTIVE PULMONARY DISEASE) WITH ACUTE BRONCHITIS (HCC): Primary | ICD-10-CM

## 2019-05-20 DIAGNOSIS — J20.9 COPD (CHRONIC OBSTRUCTIVE PULMONARY DISEASE) WITH ACUTE BRONCHITIS (HCC): Primary | ICD-10-CM

## 2019-05-20 PROBLEM — J18.9 PNEUMONIA: Status: ACTIVE | Noted: 2019-05-20

## 2019-05-20 LAB
ALBUMIN SERPL-MCNC: 3.8 G/DL (ref 3.5–5.2)
ALBUMIN/GLOB SERPL: 1 G/DL
ALP SERPL-CCNC: 72 U/L (ref 39–117)
ALT SERPL W P-5'-P-CCNC: 21 U/L (ref 1–41)
ANION GAP SERPL CALCULATED.3IONS-SCNC: 12.1 MMOL/L
APTT PPP: 30.9 SECONDS (ref 24.3–38.1)
ARTERIAL PATENCY WRIST A: ABNORMAL
AST SERPL-CCNC: 34 U/L (ref 1–40)
ATMOSPHERIC PRESS: 740 MMHG
B PARAPERT DNA SPEC QL NAA+PROBE: NOT DETECTED
B PERT DNA SPEC QL NAA+PROBE: NOT DETECTED
BASE EXCESS BLDA CALC-SCNC: 0.7 MMOL/L (ref 0–2)
BASOPHILS # BLD MANUAL: 0.11 10*3/MM3 (ref 0–0.2)
BASOPHILS NFR BLD AUTO: 1 % (ref 0–1.5)
BDY SITE: ABNORMAL
BILIRUB SERPL-MCNC: 0.7 MG/DL (ref 0.2–1.2)
BILIRUB UR QL STRIP: NEGATIVE
BLASTS NFR BLD MANUAL: 0 % (ref 0–0)
BODY TEMPERATURE: 37 C
BUN BLD-MCNC: 15 MG/DL (ref 8–23)
BUN/CREAT SERPL: 25.9 (ref 7–25)
C PNEUM DNA NPH QL NAA+NON-PROBE: NOT DETECTED
CALCIUM SPEC-SCNC: 9.4 MG/DL (ref 8.6–10.5)
CHLORIDE SERPL-SCNC: 101 MMOL/L (ref 98–107)
CLARITY UR: CLEAR
CO2 SERPL-SCNC: 23.9 MMOL/L (ref 22–29)
COLOR UR: ABNORMAL
CREAT BLD-MCNC: 0.58 MG/DL (ref 0.76–1.27)
CRP SERPL-MCNC: 9.47 MG/DL (ref 0–0.5)
DEPRECATED RDW RBC AUTO: 47.6 FL (ref 37–54)
EOSINOPHIL # BLD MANUAL: 0.11 10*3/MM3 (ref 0–0.4)
EOSINOPHIL NFR BLD MANUAL: 1 % (ref 0.3–6.2)
ERYTHROCYTE [DISTWIDTH] IN BLOOD BY AUTOMATED COUNT: 13.1 % (ref 12.3–15.4)
ERYTHROCYTE [SEDIMENTATION RATE] IN BLOOD: 17 MM/HR (ref 0–20)
FLUAV H1 2009 PAND RNA NPH QL NAA+PROBE: NOT DETECTED
FLUAV H1 HA GENE NPH QL NAA+PROBE: NOT DETECTED
FLUAV H3 RNA NPH QL NAA+PROBE: NOT DETECTED
FLUAV SUBTYP SPEC NAA+PROBE: NOT DETECTED
FLUBV RNA ISLT QL NAA+PROBE: NOT DETECTED
GFR SERPL CREATININE-BSD FRML MDRD: 139 ML/MIN/1.73
GLOBULIN UR ELPH-MCNC: 3.8 GM/DL
GLUCOSE BLD-MCNC: 95 MG/DL (ref 65–99)
GLUCOSE UR STRIP-MCNC: NEGATIVE MG/DL
HADV DNA SPEC NAA+PROBE: NOT DETECTED
HBA1C MFR BLD: 4.9 % (ref 4.8–5.6)
HCO3 BLDA-SCNC: 24.6 MMOL/L (ref 20–26)
HCOV 229E RNA SPEC QL NAA+PROBE: NOT DETECTED
HCOV HKU1 RNA SPEC QL NAA+PROBE: NOT DETECTED
HCOV NL63 RNA SPEC QL NAA+PROBE: NOT DETECTED
HCOV OC43 RNA SPEC QL NAA+PROBE: NOT DETECTED
HCT VFR BLD AUTO: 33.8 % (ref 37.5–51)
HGB BLD-MCNC: 11 G/DL (ref 13–17.7)
HGB BLDA-MCNC: 15.2 G/DL (ref 14–18)
HGB UR QL STRIP.AUTO: NEGATIVE
HMPV RNA NPH QL NAA+NON-PROBE: NOT DETECTED
HPIV1 RNA SPEC QL NAA+PROBE: NOT DETECTED
HPIV2 RNA SPEC QL NAA+PROBE: NOT DETECTED
HPIV3 RNA NPH QL NAA+PROBE: NOT DETECTED
HPIV4 P GENE NPH QL NAA+PROBE: NOT DETECTED
INR PPP: 1.05 (ref 0.9–1.1)
KETONES UR QL STRIP: NEGATIVE
LEUKOCYTE ESTERASE UR QL STRIP.AUTO: NEGATIVE
LYMPHOCYTES # BLD MANUAL: 0.69 10*3/MM3 (ref 0.7–3.1)
LYMPHOCYTES NFR BLD MANUAL: 6 % (ref 19.6–45.3)
LYMPHOCYTES NFR BLD MANUAL: 6 % (ref 5–12)
M PNEUMO IGG SER IA-ACNC: NOT DETECTED
MAGNESIUM SERPL-MCNC: 2.1 MG/DL (ref 1.6–2.4)
MCH RBC QN AUTO: 32.5 PG (ref 26.6–33)
MCHC RBC AUTO-ENTMCNC: 32.5 G/DL (ref 31.5–35.7)
MCV RBC AUTO: 100 FL (ref 79–97)
METAMYELOCYTES NFR BLD MANUAL: 0 % (ref 0–0)
MODALITY: ABNORMAL
MONOCYTES # BLD AUTO: 0.69 10*3/MM3 (ref 0.1–0.9)
MYELOCYTES NFR BLD MANUAL: 0 % (ref 0–0)
NEUTROPHILS # BLD AUTO: 9.06 10*3/MM3 (ref 1.7–7)
NEUTROPHILS NFR BLD MANUAL: 79 % (ref 42.7–76)
NEUTS BAND NFR BLD MANUAL: 0 % (ref 0–5)
NITRITE UR QL STRIP: NEGATIVE
NT-PROBNP SERPL-MCNC: 136 PG/ML (ref 5–900)
OTHER CELLS %: 0 % (ref 0–0)
PCO2 BLDA: 36.6 MM HG (ref 35–45)
PCO2 TEMP ADJ BLD: 36.6 MM HG (ref 35–45)
PH BLDA: 7.44 PH UNITS (ref 7.35–7.45)
PH UR STRIP.AUTO: 6.5 [PH] (ref 4.5–8)
PH, TEMP CORRECTED: 7.44 PH UNITS (ref 7.35–7.45)
PHOSPHATE SERPL-MCNC: 2.9 MG/DL (ref 2.5–4.5)
PLASMA CELL PREC NFR BLD MANUAL: 0 % (ref 0–0)
PLAT MORPH BLD: NORMAL
PLATELET # BLD AUTO: 128 10*3/MM3 (ref 140–450)
PMV BLD AUTO: 9.7 FL (ref 6–12)
PO2 BLDA: 68.5 MM HG (ref 83–108)
PO2 TEMP ADJ BLD: 68.5 MM HG (ref 83–108)
POTASSIUM BLD-SCNC: 4.1 MMOL/L (ref 3.5–5.2)
PROCALCITONIN SERPL-MCNC: 0.03 NG/ML (ref 0.1–0.25)
PROLYMPHOCYTES NFR BLD MANUAL: 0 % (ref 0–0)
PROMYELOCYTES NFR BLD MANUAL: 0 % (ref 0–0)
PROT SERPL-MCNC: 7.6 G/DL (ref 6–8.5)
PROT UR QL STRIP: NEGATIVE
PROTHROMBIN TIME: 13.4 SECONDS (ref 12.1–15)
RBC # BLD AUTO: 3.38 10*6/MM3 (ref 4.14–5.8)
RBC MORPH BLD: NORMAL
RHINOVIRUS RNA SPEC NAA+PROBE: NOT DETECTED
RSV RNA NPH QL NAA+NON-PROBE: NOT DETECTED
SAO2 % BLDCOA: 95.3 % (ref 94–99)
SODIUM BLD-SCNC: 137 MMOL/L (ref 136–145)
SP GR UR STRIP: 1.01 (ref 1–1.03)
TSH SERPL DL<=0.05 MIU/L-ACNC: 1.17 MIU/ML (ref 0.27–4.2)
UROBILINOGEN UR QL STRIP: ABNORMAL
VARIANT LYMPHS NFR BLD MANUAL: 7 % (ref 0–5)
VENTILATOR MODE: ABNORMAL
WBC MORPH BLD: NORMAL
WBC NRBC COR # BLD: 11.47 10*3/MM3 (ref 3.4–10.8)

## 2019-05-20 PROCEDURE — 94799 UNLISTED PULMONARY SVC/PX: CPT

## 2019-05-20 PROCEDURE — 83036 HEMOGLOBIN GLYCOSYLATED A1C: CPT | Performed by: HOSPITALIST

## 2019-05-20 PROCEDURE — 99219 PR INITIAL OBSERVATION CARE/DAY 50 MINUTES: CPT | Performed by: HOSPITALIST

## 2019-05-20 PROCEDURE — G0378 HOSPITAL OBSERVATION PER HR: HCPCS

## 2019-05-20 PROCEDURE — 85027 COMPLETE CBC AUTOMATED: CPT | Performed by: HOSPITALIST

## 2019-05-20 PROCEDURE — 87581 M.PNEUMON DNA AMP PROBE: CPT | Performed by: HOSPITALIST

## 2019-05-20 PROCEDURE — 93005 ELECTROCARDIOGRAM TRACING: CPT | Performed by: HOSPITALIST

## 2019-05-20 PROCEDURE — 80053 COMPREHEN METABOLIC PANEL: CPT | Performed by: HOSPITALIST

## 2019-05-20 PROCEDURE — 87070 CULTURE OTHR SPECIMN AEROBIC: CPT | Performed by: HOSPITALIST

## 2019-05-20 PROCEDURE — 25010000002 LEVOFLOXACIN PER 250 MG: Performed by: HOSPITALIST

## 2019-05-20 PROCEDURE — 84100 ASSAY OF PHOSPHORUS: CPT | Performed by: HOSPITALIST

## 2019-05-20 PROCEDURE — 25010000002 ENOXAPARIN PER 10 MG: Performed by: HOSPITALIST

## 2019-05-20 PROCEDURE — 81003 URINALYSIS AUTO W/O SCOPE: CPT | Performed by: HOSPITALIST

## 2019-05-20 PROCEDURE — 36600 WITHDRAWAL OF ARTERIAL BLOOD: CPT

## 2019-05-20 PROCEDURE — 87077 CULTURE AEROBIC IDENTIFY: CPT | Performed by: HOSPITALIST

## 2019-05-20 PROCEDURE — 83735 ASSAY OF MAGNESIUM: CPT | Performed by: HOSPITALIST

## 2019-05-20 PROCEDURE — 82803 BLOOD GASES ANY COMBINATION: CPT

## 2019-05-20 PROCEDURE — 84145 PROCALCITONIN (PCT): CPT | Performed by: HOSPITALIST

## 2019-05-20 PROCEDURE — 86140 C-REACTIVE PROTEIN: CPT | Performed by: HOSPITALIST

## 2019-05-20 PROCEDURE — 93010 ELECTROCARDIOGRAM REPORT: CPT | Performed by: INTERNAL MEDICINE

## 2019-05-20 PROCEDURE — 87633 RESP VIRUS 12-25 TARGETS: CPT | Performed by: HOSPITALIST

## 2019-05-20 PROCEDURE — 87185 SC STD ENZYME DETCJ PER NZM: CPT | Performed by: HOSPITALIST

## 2019-05-20 PROCEDURE — 83880 ASSAY OF NATRIURETIC PEPTIDE: CPT | Performed by: HOSPITALIST

## 2019-05-20 PROCEDURE — 84443 ASSAY THYROID STIM HORMONE: CPT | Performed by: HOSPITALIST

## 2019-05-20 PROCEDURE — 87205 SMEAR GRAM STAIN: CPT | Performed by: HOSPITALIST

## 2019-05-20 PROCEDURE — 87040 BLOOD CULTURE FOR BACTERIA: CPT | Performed by: HOSPITALIST

## 2019-05-20 PROCEDURE — 85007 BL SMEAR W/DIFF WBC COUNT: CPT | Performed by: HOSPITALIST

## 2019-05-20 PROCEDURE — 94761 N-INVAS EAR/PLS OXIMETRY MLT: CPT

## 2019-05-20 PROCEDURE — 94640 AIRWAY INHALATION TREATMENT: CPT

## 2019-05-20 PROCEDURE — 71046 X-RAY EXAM CHEST 2 VIEWS: CPT

## 2019-05-20 PROCEDURE — 96366 THER/PROPH/DIAG IV INF ADDON: CPT

## 2019-05-20 PROCEDURE — 87486 CHLMYD PNEUM DNA AMP PROBE: CPT | Performed by: HOSPITALIST

## 2019-05-20 PROCEDURE — 96365 THER/PROPH/DIAG IV INF INIT: CPT

## 2019-05-20 PROCEDURE — 85610 PROTHROMBIN TIME: CPT | Performed by: HOSPITALIST

## 2019-05-20 PROCEDURE — 96372 THER/PROPH/DIAG INJ SC/IM: CPT

## 2019-05-20 PROCEDURE — 85730 THROMBOPLASTIN TIME PARTIAL: CPT | Performed by: HOSPITALIST

## 2019-05-20 PROCEDURE — 85652 RBC SED RATE AUTOMATED: CPT | Performed by: HOSPITALIST

## 2019-05-20 PROCEDURE — G0379 DIRECT REFER HOSPITAL OBSERV: HCPCS

## 2019-05-20 PROCEDURE — 87798 DETECT AGENT NOS DNA AMP: CPT | Performed by: HOSPITALIST

## 2019-05-20 RX ORDER — ONDANSETRON 2 MG/ML
4 INJECTION INTRAMUSCULAR; INTRAVENOUS EVERY 6 HOURS PRN
Status: DISCONTINUED | OUTPATIENT
Start: 2019-05-20 | End: 2019-05-22 | Stop reason: HOSPADM

## 2019-05-20 RX ORDER — ASPIRIN 325 MG
325 TABLET ORAL DAILY
Status: DISCONTINUED | OUTPATIENT
Start: 2019-05-20 | End: 2019-05-22 | Stop reason: HOSPADM

## 2019-05-20 RX ORDER — ATORVASTATIN CALCIUM 40 MG/1
40 TABLET, FILM COATED ORAL NIGHTLY
Status: DISCONTINUED | OUTPATIENT
Start: 2019-05-20 | End: 2019-05-22 | Stop reason: HOSPADM

## 2019-05-20 RX ORDER — SODIUM CHLORIDE 0.9 % (FLUSH) 0.9 %
3-10 SYRINGE (ML) INJECTION AS NEEDED
Status: DISCONTINUED | OUTPATIENT
Start: 2019-05-20 | End: 2019-05-22 | Stop reason: HOSPADM

## 2019-05-20 RX ORDER — HYDROCODONE BITARTRATE AND ACETAMINOPHEN 5; 325 MG/1; MG/1
TABLET ORAL
Status: COMPLETED
Start: 2019-05-20 | End: 2019-05-20

## 2019-05-20 RX ORDER — DOCUSATE SODIUM 100 MG/1
100 CAPSULE, LIQUID FILLED ORAL NIGHTLY
Status: DISCONTINUED | OUTPATIENT
Start: 2019-05-20 | End: 2019-05-22 | Stop reason: HOSPADM

## 2019-05-20 RX ORDER — FLUTICASONE PROPIONATE 50 MCG
2 SPRAY, SUSPENSION (ML) NASAL DAILY
Status: DISCONTINUED | OUTPATIENT
Start: 2019-05-20 | End: 2019-05-22 | Stop reason: HOSPADM

## 2019-05-20 RX ORDER — CYCLOBENZAPRINE HCL 10 MG
10 TABLET ORAL NIGHTLY
Status: DISCONTINUED | OUTPATIENT
Start: 2019-05-20 | End: 2019-05-22 | Stop reason: HOSPADM

## 2019-05-20 RX ORDER — SODIUM CHLORIDE 0.9 % (FLUSH) 0.9 %
3 SYRINGE (ML) INJECTION EVERY 12 HOURS SCHEDULED
Status: DISCONTINUED | OUTPATIENT
Start: 2019-05-20 | End: 2019-05-22 | Stop reason: HOSPADM

## 2019-05-20 RX ORDER — SODIUM CHLORIDE 9 MG/ML
40 INJECTION, SOLUTION INTRAVENOUS AS NEEDED
Status: DISCONTINUED | OUTPATIENT
Start: 2019-05-20 | End: 2019-05-22 | Stop reason: HOSPADM

## 2019-05-20 RX ORDER — FLUTICASONE PROPIONATE 50 MCG
2 SPRAY, SUSPENSION (ML) NASAL DAILY
COMMUNITY

## 2019-05-20 RX ORDER — GABAPENTIN 300 MG/1
300 CAPSULE ORAL EVERY 12 HOURS SCHEDULED
Status: DISCONTINUED | OUTPATIENT
Start: 2019-05-20 | End: 2019-05-22 | Stop reason: HOSPADM

## 2019-05-20 RX ORDER — NICOTINE 21 MG/24HR
1 PATCH, TRANSDERMAL 24 HOURS TRANSDERMAL EVERY 24 HOURS
Status: DISCONTINUED | OUTPATIENT
Start: 2019-05-20 | End: 2019-05-22 | Stop reason: HOSPADM

## 2019-05-20 RX ORDER — ONDANSETRON 4 MG/1
4 TABLET, FILM COATED ORAL EVERY 6 HOURS PRN
Status: DISCONTINUED | OUTPATIENT
Start: 2019-05-20 | End: 2019-05-22 | Stop reason: HOSPADM

## 2019-05-20 RX ORDER — CLOPIDOGREL BISULFATE 75 MG/1
75 TABLET ORAL DAILY
Status: DISCONTINUED | OUTPATIENT
Start: 2019-05-20 | End: 2019-05-22 | Stop reason: HOSPADM

## 2019-05-20 RX ORDER — IPRATROPIUM BROMIDE AND ALBUTEROL SULFATE 2.5; .5 MG/3ML; MG/3ML
3 SOLUTION RESPIRATORY (INHALATION)
Status: DISCONTINUED | OUTPATIENT
Start: 2019-05-20 | End: 2019-05-22

## 2019-05-20 RX ORDER — ACETAMINOPHEN 325 MG/1
650 TABLET ORAL EVERY 4 HOURS PRN
Status: DISCONTINUED | OUTPATIENT
Start: 2019-05-20 | End: 2019-05-22 | Stop reason: HOSPADM

## 2019-05-20 RX ORDER — ASCORBIC ACID 500 MG
1000 TABLET ORAL DAILY
Status: DISCONTINUED | OUTPATIENT
Start: 2019-05-20 | End: 2019-05-22 | Stop reason: HOSPADM

## 2019-05-20 RX ORDER — LEVOFLOXACIN 5 MG/ML
750 INJECTION, SOLUTION INTRAVENOUS EVERY 24 HOURS
Status: DISCONTINUED | OUTPATIENT
Start: 2019-05-20 | End: 2019-05-22 | Stop reason: HOSPADM

## 2019-05-20 RX ORDER — HYDROCODONE BITARTRATE AND ACETAMINOPHEN 5; 325 MG/1; MG/1
1 TABLET ORAL ONCE
Status: COMPLETED | OUTPATIENT
Start: 2019-05-21 | End: 2019-05-20

## 2019-05-20 RX ORDER — MELOXICAM 7.5 MG/1
7.5 TABLET ORAL DAILY
Status: DISCONTINUED | OUTPATIENT
Start: 2019-05-20 | End: 2019-05-22 | Stop reason: HOSPADM

## 2019-05-20 RX ORDER — HYDROCODONE BITARTRATE AND ACETAMINOPHEN 5; 325 MG/1; MG/1
1 TABLET ORAL EVERY 6 HOURS PRN
COMMUNITY
End: 2019-05-22 | Stop reason: HOSPADM

## 2019-05-20 RX ORDER — FAMOTIDINE 20 MG/1
40 TABLET, FILM COATED ORAL DAILY
Status: DISCONTINUED | OUTPATIENT
Start: 2019-05-20 | End: 2019-05-22 | Stop reason: HOSPADM

## 2019-05-20 RX ORDER — TRAMADOL HYDROCHLORIDE 50 MG/1
50 TABLET ORAL EVERY 8 HOURS PRN
Status: DISCONTINUED | OUTPATIENT
Start: 2019-05-20 | End: 2019-05-22 | Stop reason: HOSPADM

## 2019-05-20 RX ORDER — ISOSORBIDE MONONITRATE 30 MG/1
30 TABLET, EXTENDED RELEASE ORAL DAILY
Status: DISCONTINUED | OUTPATIENT
Start: 2019-05-20 | End: 2019-05-22 | Stop reason: HOSPADM

## 2019-05-20 RX ADMIN — FAMOTIDINE 40 MG: 20 TABLET, FILM COATED ORAL at 13:50

## 2019-05-20 RX ADMIN — DOCUSATE SODIUM 100 MG: 100 CAPSULE, LIQUID FILLED ORAL at 21:22

## 2019-05-20 RX ADMIN — GABAPENTIN 300 MG: 300 CAPSULE ORAL at 13:56

## 2019-05-20 RX ADMIN — SODIUM CHLORIDE, PRESERVATIVE FREE 3 ML: 5 INJECTION INTRAVENOUS at 13:52

## 2019-05-20 RX ADMIN — IPRATROPIUM BROMIDE AND ALBUTEROL SULFATE 3 ML: .5; 3 SOLUTION RESPIRATORY (INHALATION) at 19:08

## 2019-05-20 RX ADMIN — MELOXICAM 7.5 MG: 7.5 TABLET ORAL at 13:48

## 2019-05-20 RX ADMIN — IPRATROPIUM BROMIDE AND ALBUTEROL SULFATE 3 ML: .5; 3 SOLUTION RESPIRATORY (INHALATION) at 15:30

## 2019-05-20 RX ADMIN — FLUTICASONE PROPIONATE 2 SPRAY: 50 SPRAY, METERED NASAL at 13:44

## 2019-05-20 RX ADMIN — CLOPIDOGREL BISULFATE 75 MG: 75 TABLET ORAL at 13:49

## 2019-05-20 RX ADMIN — ISOSORBIDE MONONITRATE 30 MG: 30 TABLET, EXTENDED RELEASE ORAL at 13:48

## 2019-05-20 RX ADMIN — SODIUM CHLORIDE, PRESERVATIVE FREE 10 ML: 5 INJECTION INTRAVENOUS at 21:21

## 2019-05-20 RX ADMIN — TRAMADOL HYDROCHLORIDE 50 MG: 50 TABLET, FILM COATED ORAL at 18:07

## 2019-05-20 RX ADMIN — NICOTINE 1 PATCH: 21 PATCH TRANSDERMAL at 13:46

## 2019-05-20 RX ADMIN — CYCLOBENZAPRINE HYDROCHLORIDE 10 MG: 10 TABLET, FILM COATED ORAL at 21:21

## 2019-05-20 RX ADMIN — ENOXAPARIN SODIUM 40 MG: 40 INJECTION SUBCUTANEOUS at 13:52

## 2019-05-20 RX ADMIN — ATORVASTATIN CALCIUM 40 MG: 40 TABLET, FILM COATED ORAL at 21:21

## 2019-05-20 RX ADMIN — IPRATROPIUM BROMIDE AND ALBUTEROL SULFATE 3 ML: .5; 3 SOLUTION RESPIRATORY (INHALATION) at 22:54

## 2019-05-20 RX ADMIN — IPRATROPIUM BROMIDE AND ALBUTEROL SULFATE 3 ML: .5; 3 SOLUTION RESPIRATORY (INHALATION) at 12:29

## 2019-05-20 RX ADMIN — HYDROCODONE BITARTRATE AND ACETAMINOPHEN 1 TABLET: 5; 325 TABLET ORAL at 23:51

## 2019-05-20 RX ADMIN — ASPIRIN 325 MG: 325 TABLET, COATED ORAL at 13:47

## 2019-05-20 RX ADMIN — OXYCODONE HYDROCHLORIDE AND ACETAMINOPHEN 1000 MG: 500 TABLET ORAL at 13:47

## 2019-05-20 RX ADMIN — GABAPENTIN 300 MG: 300 CAPSULE ORAL at 21:21

## 2019-05-20 RX ADMIN — LEVOFLOXACIN 750 MG: 5 INJECTION, SOLUTION INTRAVENOUS at 13:36

## 2019-05-20 NOTE — H&P
[facility] HOSPITALIST     Camille Oliveira MD    CHIEF COMPLAINT: Cough and green sputum    HISTORY OF PRESENT ILLNESS:    Call from Dr. Oliveira.  It was felt that the patient had community-acquired pneumonia by the primary care physician Dr. Oliveira.  The patient had chills and cough for 2 days fevers of 102 it was worse last p.m. and he called his primary care she saw him first thing this morning and immediately called me to ask for admission.  We will put the patient as observation and get labs and if it is deemed that he has pneumonia we will change him to a full admission.  At this time we have the patient on pneumonia protocol and admission protocol and we are awaiting stat labs.  His heart rate was 125 with the fever and sats were 91% in the doctor's office he is usually running 93 to 94% he has known COPD but has never seen a pulmonary doctor per Dr. Oliveira he was having crackles and wheezing at the time she saw him in her office.  Admit direct as observation.  Patient does not have nausea or vomiting or diarrhea.  He has cough, green sputum, fever, fatigue.      Past Medical History:   Diagnosis Date   • Angina pectoris (CMS/[facility])    • CAD (coronary artery disease)     3/13   • DOMINGUEZ (dyspnea on exertion)    • Hyperlipidemia    • Leg pain    • PAD (peripheral artery disease) (CMS/[facility])    • PVC (premature ventricular contraction)    • PVD (peripheral vascular disease) (CMS/[facility])      Past Surgical History:   Procedure Laterality Date   • ILIAC ARTERY STENT  03/2013   • SHOULDER SURGERY  2013     Family History   Problem Relation Age of Onset   • No Known Problems Mother    • No Known Problems Father      Social History     Tobacco Use   • Smoking status: Current Every Day Smoker     Packs/day: 0.50   • Smokeless tobacco: Never Used   Substance Use Topics   • Alcohol use: Yes   • Drug use: Not on file     Medications Prior to Admission   Medication Sig Dispense Refill Last Dose   •  Ascorbic Acid (VITAMIN C PO) Take 1,000 mg by mouth Daily.   5/19/2019 at 0900   • aspirin 325 MG tablet Take 325 mg by mouth Daily.   5/19/2019 at 0900   • atorvastatin (LIPITOR) 40 MG tablet Take 40 mg by mouth Every Night.   5/19/2019 at 2100   • clopidogrel (PLAVIX) 75 MG tablet TAKE 1 TABLET BY MOUTH ONCE DAILY (Patient taking differently: TAKE 1 TABLET BY MOUTH at night) 90 tablet 1 5/19/2019 at 2100   • cyclobenzaprine (FLEXERIL) 10 MG tablet Take 10 mg by mouth Every Night.   5/19/2019 at 2100   • Docusate Sodium (COLACE PO) Take 1 tablet by mouth Every Night.   5/19/2019 at 2100   • fluticasone (FLONASE) 50 MCG/ACT nasal spray 2 sprays into the nostril(s) as directed by provider Daily.   5/19/2019 at 0900   • gabapentin (NEURONTIN) 300 MG capsule Take 300 mg by mouth 2 (Two) Times a Day.   5/19/2019 at 2100   • HYDROcodone-acetaminophen (NORCO) 5-325 MG per tablet Take 1 tablet by mouth Every 6 (Six) Hours As Needed.   Past Week at Unknown time   • isosorbide mononitrate (IMDUR) 30 MG 24 hr tablet TAKE 1 TABLET BY MOUTH ONCE DAILY 90 tablet 0 5/19/2019 at 0900   • meloxicam (MOBIC) 7.5 MG tablet Take 7.5 mg by mouth Daily.   5/19/2019 at 0900   • Multiple Vitamin (MULTIVITAMIN) tablet Take 1 tablet by mouth daily.   5/19/2019 at 0900   • Omega-3 Fatty Acids ([employer] FISH OIL) 1200 MG capsule Take 1 tablet by mouth Daily.   5/19/2019 at 2100   • traMADol (ULTRAM) 50 MG tablet Take 1 tablet by mouth Every 8 (Eight) Hours As Needed.   5/19/2019 at 2100     Allergies:  Penicillins and Iodine    REVIEW OF SYSTEMS:  Please see the above history of present illness for pertinent positives and negatives.  The remainder of the patient's systems have been reviewed and are negative.       Vital Signs  Temp:  [98.5 °F (36.9 °C)] 98.5 °F (36.9 °C)  Heart Rate:  [83] 83  Resp:  [18] 18  BP: (125)/(76) 125/76  Oxygen Therapy  SpO2: 100 %  Device (Oxygen Therapy): room air}  Body mass index is 27.11 kg/m².  Flowsheet Rows       "First Filed Value   Admission Height  182.9 cm (72\") Documented at 05/20/2019 0958   Admission Weight  90.7 kg (199 lb 14.4 oz) Documented at 05/20/2019 0958        Body mass index is 27.11 kg/m².       Physical Exam:  Physical Exam   Constitutional: Patient appears weak and tired but in no acute distress.  He is not coughing   HEENT:   Head: Normocephalic and atraumatic.   Eyes:  Pupils are equal, round, and reactive to light. EOM are intact. Sclerae are anicteric and noninjected.  Mouth and Throat: Patient has moist mucous membranes. Oropharynx is clear of any erythema or exudate.     Neck: Neck supple. No JVD present. No thyromegaly present. No lymphadenopathy present.  Cardiovascular: Regular rate, regular rhythm, S1 normal and S2 normal.  Exam reveals no gallop and no friction rub.  No murmur heard.  Pulmonary/Chest: Lungs are clear to auscultation bilaterally.  Decreased breath sounds but no wheezing or rhonchi and no respiratory distress. No rales.   Abdominal: Soft. Bowel sounds are normal. No distension and no mass. There is no hepatosplenomegaly. There is no tenderness.   Musculoskeletal: Normal muscle tone  Extremities: No edema. Pulses are palpable in all 4 extremities.  Neurological: Patient is alert and oriented to person, place, and time. Cranial nerves II-XII are grossly intact with no focal deficits.  Skin: Skin is warm. No rash noted. Nails show no clubbing.  No cyanosis or erythema.    Emotional Behavior:    Judgement and Insight: good   Mental Status:  Alertness  good   Memory:  Good I think.  He is very hard of hearing and his wife answers most questions   Mood and Affect:         Depression  None apparent               Anxiety  None apparent    Debilities:   Physical Weakness  Yes from illness   Handicaps  None apparent   Disabilities  None apparent   Agitation  no     Results Review:    I reviewed the patient's new clinical results.  Lab Results (most recent)     None          Imaging Results " (most recent)     None        not reviewed but ordered stat.      ECG/EMG Results (most recent)     None        not reviewed but ordered stat  Assessment/Plan          Presumed pneumonia    COPD    CAD    PVD:  Iliac stent    HLD    OA/  Shoulder surgery    Tobacco abuse    Alcohol use              I discussed the patients findings and my recommendations with patient and wife who does most of the history and talking secondary to patient is Elim IRA.     Rahel Yarbrough DO  05/20/19  11:52 AM    Time: Observation 35060/ 50 min

## 2019-05-21 PROCEDURE — 94762 N-INVAS EAR/PLS OXIMTRY CONT: CPT

## 2019-05-21 PROCEDURE — 63710000001 PREDNISONE PER 1 MG: Performed by: INTERNAL MEDICINE

## 2019-05-21 PROCEDURE — G0378 HOSPITAL OBSERVATION PER HR: HCPCS

## 2019-05-21 PROCEDURE — 94799 UNLISTED PULMONARY SVC/PX: CPT

## 2019-05-21 PROCEDURE — 25010000002 ENOXAPARIN PER 10 MG: Performed by: HOSPITALIST

## 2019-05-21 PROCEDURE — 99225 PR SBSQ OBSERVATION CARE/DAY 25 MINUTES: CPT | Performed by: HOSPITALIST

## 2019-05-21 PROCEDURE — 96366 THER/PROPH/DIAG IV INF ADDON: CPT

## 2019-05-21 PROCEDURE — 96372 THER/PROPH/DIAG INJ SC/IM: CPT

## 2019-05-21 PROCEDURE — 25010000002 LEVOFLOXACIN PER 250 MG: Performed by: HOSPITALIST

## 2019-05-21 RX ORDER — PREDNISONE 20 MG/1
40 TABLET ORAL
Status: DISCONTINUED | OUTPATIENT
Start: 2019-05-21 | End: 2019-05-22 | Stop reason: HOSPADM

## 2019-05-21 RX ADMIN — SODIUM CHLORIDE, PRESERVATIVE FREE 3 ML: 5 INJECTION INTRAVENOUS at 20:35

## 2019-05-21 RX ADMIN — ENOXAPARIN SODIUM 40 MG: 40 INJECTION SUBCUTANEOUS at 13:12

## 2019-05-21 RX ADMIN — ASPIRIN 325 MG: 325 TABLET, COATED ORAL at 10:26

## 2019-05-21 RX ADMIN — PREDNISONE 40 MG: 20 TABLET ORAL at 10:24

## 2019-05-21 RX ADMIN — IPRATROPIUM BROMIDE AND ALBUTEROL SULFATE 3 ML: .5; 3 SOLUTION RESPIRATORY (INHALATION) at 07:01

## 2019-05-21 RX ADMIN — SODIUM CHLORIDE, PRESERVATIVE FREE 3 ML: 5 INJECTION INTRAVENOUS at 10:27

## 2019-05-21 RX ADMIN — IPRATROPIUM BROMIDE AND ALBUTEROL SULFATE 3 ML: .5; 3 SOLUTION RESPIRATORY (INHALATION) at 15:20

## 2019-05-21 RX ADMIN — ATORVASTATIN CALCIUM 40 MG: 40 TABLET, FILM COATED ORAL at 20:34

## 2019-05-21 RX ADMIN — LEVOFLOXACIN 750 MG: 5 INJECTION, SOLUTION INTRAVENOUS at 13:11

## 2019-05-21 RX ADMIN — NICOTINE 1 PATCH: 21 PATCH TRANSDERMAL at 16:43

## 2019-05-21 RX ADMIN — TRAMADOL HYDROCHLORIDE 50 MG: 50 TABLET, FILM COATED ORAL at 06:08

## 2019-05-21 RX ADMIN — IPRATROPIUM BROMIDE AND ALBUTEROL SULFATE 3 ML: .5; 3 SOLUTION RESPIRATORY (INHALATION) at 03:08

## 2019-05-21 RX ADMIN — MELOXICAM 7.5 MG: 7.5 TABLET ORAL at 10:33

## 2019-05-21 RX ADMIN — FLUTICASONE PROPIONATE 2 SPRAY: 50 SPRAY, METERED NASAL at 10:27

## 2019-05-21 RX ADMIN — DOCUSATE SODIUM 100 MG: 100 CAPSULE, LIQUID FILLED ORAL at 20:35

## 2019-05-21 RX ADMIN — CYCLOBENZAPRINE HYDROCHLORIDE 10 MG: 10 TABLET, FILM COATED ORAL at 20:34

## 2019-05-21 RX ADMIN — OXYCODONE HYDROCHLORIDE AND ACETAMINOPHEN 1000 MG: 500 TABLET ORAL at 10:25

## 2019-05-21 RX ADMIN — IPRATROPIUM BROMIDE AND ALBUTEROL SULFATE 3 ML: .5; 3 SOLUTION RESPIRATORY (INHALATION) at 11:00

## 2019-05-21 RX ADMIN — GABAPENTIN 300 MG: 300 CAPSULE ORAL at 20:35

## 2019-05-21 RX ADMIN — IPRATROPIUM BROMIDE AND ALBUTEROL SULFATE 3 ML: .5; 3 SOLUTION RESPIRATORY (INHALATION) at 22:41

## 2019-05-21 RX ADMIN — GABAPENTIN 300 MG: 300 CAPSULE ORAL at 10:24

## 2019-05-21 RX ADMIN — IPRATROPIUM BROMIDE AND ALBUTEROL SULFATE 3 ML: .5; 3 SOLUTION RESPIRATORY (INHALATION) at 18:59

## 2019-05-21 RX ADMIN — ISOSORBIDE MONONITRATE 30 MG: 30 TABLET, EXTENDED RELEASE ORAL at 10:25

## 2019-05-21 RX ADMIN — FAMOTIDINE 40 MG: 20 TABLET, FILM COATED ORAL at 10:26

## 2019-05-21 RX ADMIN — CLOPIDOGREL BISULFATE 75 MG: 75 TABLET ORAL at 10:26

## 2019-05-21 NOTE — CONSULTS
Patient Identification:  Derik Borja  69 y.o.  male  1950  [id number]          LOS 0    Requesting physician: Dr Yarbrough    Reason for Consultation:  COPD exacerbation and PNA    History of Present Illness:     Thank for this pulmonary consult.  69-year-old male who is been feeling ill since this past Thursday presents with increasing shortness of breath and cough.  Cough is productive of thick green sputum.  It is thicker than normal but coloration is not significant change from his baseline smoker's cough.  He did have a fever at home on Thursday he states.  No chills, hemoptysis or chest pain.  Shortness of breath is moderate in nature and worse with activity.  He continues to smoke.  Has not previously required supplemental oxygen.  Still works full-time as a .  Chest x-ray does not show infiltrate.  Has had symptoms of moderate wheezing.          Past Medical History:  Past Medical History:   Diagnosis Date   • Angina pectoris (CMS/[facility])    • CAD (coronary artery disease)     3/13   • DOMINGUEZ (dyspnea on exertion)    • Hyperlipidemia    • Leg pain    • PAD (peripheral artery disease) (CMS/[facility])    • PVC (premature ventricular contraction)    • PVD (peripheral vascular disease) (CMS/[facility])        Past Surgical History:  Past Surgical History:   Procedure Laterality Date   • ILIAC ARTERY STENT  03/2013   • SHOULDER SURGERY  2013        Home Meds:  Medications Prior to Admission   Medication Sig Dispense Refill Last Dose   • Ascorbic Acid (VITAMIN C PO) Take 1,000 mg by mouth Daily.   5/19/2019 at 0900   • aspirin 325 MG tablet Take 325 mg by mouth Daily.   5/19/2019 at 0900   • atorvastatin (LIPITOR) 40 MG tablet Take 40 mg by mouth Every Night.   5/19/2019 at 2100   • clopidogrel (PLAVIX) 75 MG tablet TAKE 1 TABLET BY MOUTH ONCE DAILY (Patient taking differently: TAKE 1 TABLET BY MOUTH at night) 90 tablet 1 5/19/2019 at 2100   • cyclobenzaprine (FLEXERIL) 10 MG tablet Take 10 mg by mouth Every  Night.   5/19/2019 at 2100   • Docusate Sodium (COLACE PO) Take 1 tablet by mouth Every Night.   5/19/2019 at 2100   • fluticasone (FLONASE) 50 MCG/ACT nasal spray 2 sprays into the nostril(s) as directed by provider Daily.   5/19/2019 at 0900   • gabapentin (NEURONTIN) 300 MG capsule Take 300 mg by mouth 2 (Two) Times a Day.   5/19/2019 at 2100   • HYDROcodone-acetaminophen (NORCO) 5-325 MG per tablet Take 1 tablet by mouth Every 6 (Six) Hours As Needed.   Past Week at Unknown time   • isosorbide mononitrate (IMDUR) 30 MG 24 hr tablet TAKE 1 TABLET BY MOUTH ONCE DAILY 90 tablet 0 5/19/2019 at 0900   • meloxicam (MOBIC) 7.5 MG tablet Take 7.5 mg by mouth Daily.   5/19/2019 at 0900   • Multiple Vitamin (MULTIVITAMIN) tablet Take 1 tablet by mouth daily.   5/19/2019 at 0900   • Omega-3 Fatty Acids ([employer] FISH OIL) 1200 MG capsule Take 1 tablet by mouth Daily.   5/19/2019 at 2100   • traMADol (ULTRAM) 50 MG tablet Take 1 tablet by mouth Every 8 (Eight) Hours As Needed.   5/19/2019 at 2100         Allergies:  Allergies   Allergen Reactions   • Penicillins Swelling     Throat swelled  Throat swelled  Throat swelled   • Iodine Itching       Social History:   Social History     Socioeconomic History   • Marital status:      Spouse name: Not on file   • Number of children: Not on file   • Years of education: Not on file   • Highest education level: Not on file   Tobacco Use   • Smoking status: Current Every Day Smoker     Packs/day: 0.50   • Smokeless tobacco: Never Used   Substance and Sexual Activity   • Alcohol use: Yes       Family History:  Family History   Problem Relation Age of Onset   • No Known Problems Mother    • No Known Problems Father        Review of Systems:  Positive recent fevers  Denies nausea or vomiting  No new vision or hearing changes  No chest pain  Positive shortness of breath and cough with wheezing  No diarrhea, hematemesis or hematochezia, no dysuria or frequency  No musculoskeletal  "complaints  No heat or cold intolerance  No skin rashes  No dizziness or confusion.  No seizure activity  No new anxiety or depression  12 system review of systems performed and all else negative    Objective:    PHYSICAL EXAM:    /73 (BP Location: Right arm, Patient Position: Lying)   Pulse 88   Temp 97.5 °F (36.4 °C) (Oral)   Resp 20   Ht 182.9 cm (72\")   Wt 90.7 kg (199 lb 14.4 oz)   SpO2 92%   BMI 27.11 kg/m²  Body mass index is 27.11 kg/m². 92% 90.7 kg (199 lb 14.4 oz)    GENERAL APPEARANCE:   · Well developed  · Well nourished  · No acute distress   EYES:    · PERRL                                                                           · Conjunctiva normal  · Sclera non-icteric.  HENT:   · Atraumatic, normocephalic  · External ears and nose normal  · Moist mucous membranes and no ulcers  NECK:  · Thyroid not enlarged  · Trachea midline   RESPIRATORY:    · Nonlabored breathing   · Diminished bilateral breath sounds  · No rales. No wheezing  · Hyperresonant to percussion  CARDIOVASCULAR:    · RRR  · Normal S1, S2  · No murmur  · Lower extremity edema: none    GI:   · Bowel sounds normal  · Abdomen soft , non-distended, non-tender  · No abdominal masses.    MUSCULOSKELETAL:  · Normal movement of extremities  · No tenderness, no deformities  · No clubbing or cyanosis   Skin:    · No visible rashes  · No palpable nodules  PSYCHIATRIC:  · Speech and behavior appropriate  · Normal mood and affect  · Oriented to person, place and time  NEUROLOGIC:  Grossly intact.  No focal deficits    Lab Review:      Lab Results   Component Value Date    WBC 11.47 (H) 05/20/2019    HGB 11.0 (L) 05/20/2019    HCT 33.8 (L) 05/20/2019    .0 (H) 05/20/2019     (L) 05/20/2019            Lab Results   Component Value Date    CREATININE 0.58 (L) 05/20/2019    BUN 15 05/20/2019     05/20/2019    K 4.1 05/20/2019     05/20/2019    CO2 23.9 05/20/2019      No results found for: CKTOTAL, CKMB, " CKMBINDEX, TROPONINI, TROPONINT             Imaging reviewed  chest X-ray reviewed shows no definitive active disease.  Pulmonary emphysema and mild scattered lower lung fibrosis noted.           Assessment:  Acute exacerbation COPD  Acute bronchitis  Basilar mild fibrosis related to COPD  Acute hypoxemia  Persistent smoker  Alcohol use    Recommendations:  Bronchodilators and steroid for COPD exacerbation.  I do not think that the patient has pneumonia but symptoms of acute bronchitis and would benefit from antibiotic short course.  Fibrosis suspected to be related to COPD; not IPF.  Wean oxygen as able but may require supplemental oxygen at discharge.  Encourage patient to quit smoking.  Plan to follow-up with us in the office after discharge.      Thank you for allowing me to participate in the care of this patient.  I will continue to follow along with you.      Derik Hillman MD  [facility], [facility]  Pulmonary and Critical Care Medicine    5/21/2019  8:57 AM

## 2019-05-21 NOTE — PLAN OF CARE
Problem: Patient Care Overview  Goal: Discharge Needs Assessment  Outcome: Ongoing (interventions implemented as appropriate)   05/21/19 1413   Discharge Needs Assessment   Readmission Within the Last 30 Days no previous admission in last 30 days   Concerns to be Addressed denies needs/concerns at this time   Patient/Family Anticipates Transition to home   Patient/Family Anticipated Services at Transition none   Transportation Concerns car, none   Transportation Anticipated car, drives self   Equipment Needed After Discharge none   Disability   Equipment Currently Used at Home none

## 2019-05-21 NOTE — PLAN OF CARE
Problem: Patient Care Overview  Goal: Plan of Care Review  Outcome: Ongoing (interventions implemented as appropriate)   05/21/19 7173   Coping/Psychosocial   Plan of Care Reviewed With patient;spouse   Plan of Care Review   Progress improving   OTHER   Outcome Summary feeling better today, congested cough,occasional wheeze, nicotine patch on ,IV Levaquin given.     Goal: Individualization and Mutuality  Outcome: Ongoing (interventions implemented as appropriate)    Goal: Discharge Needs Assessment  Outcome: Ongoing (interventions implemented as appropriate)      Problem: Chronic Obstructive Pulmonary Disease (Adult)  Goal: Signs and Symptoms of Listed Potential Problems Will be Absent, Minimized or Managed (Chronic Obstructive Pulmonary Disease)  Outcome: Ongoing (interventions implemented as appropriate)      Problem: Pain, Chronic (Adult)  Goal: Identify Related Risk Factors and Signs and Symptoms  Outcome: Outcome(s) achieved Date Met: 05/21/19    Goal: Acceptable Pain/Comfort Level and Functional Ability  Outcome: Ongoing (interventions implemented as appropriate)

## 2019-05-21 NOTE — NURSING NOTE
Discharge Planning Assessment  MAITE Roman     Patient Name: Derik oBrja  MRN: [id number]  Today's Date: 5/21/2019    Admit Date: 5/20/2019    Discharge Needs Assessment     Row Name 05/21/19 1413       Living Environment    Lives With  spouse    Current Living Arrangements  home/apartment/condo    Primary Care Provided by  self    Provides Primary Care For  no one    Family Caregiver if Needed  spouse    Quality of Family Relationships  supportive;involved    Able to Return to Prior Arrangements  yes       Resource/Environmental Concerns    Resource/Environmental Concerns  none    Transportation Concerns  car, none       Transition Planning    Patient/Family Anticipates Transition to  home    Patient/Family Anticipated Services at Transition  none    Transportation Anticipated  car, drives self       Discharge Needs Assessment    Readmission Within the Last 30 Days  no previous admission in last 30 days    Concerns to be Addressed  denies needs/concerns at this time    Equipment Currently Used at Home  none    Equipment Needed After Discharge  none        Discharge Plan     Row Name 05/21/19 1416       Plan    Plan  plan home, assess needs    Patient/Family in Agreement with Plan  yes    Plan Comments  Spoke with patient at bedside. Face sheet verified. Patient lives with his wife in a home in [facility]. He is independent of ADLs including working and driving prior to admission. He denies use of DME, home 02, cpap/bipap. He has not used home health or rehab services previously. He uses [facility] [place] and denies issues obtaining medications. He states he has a living will, encouraged to provide a copy to be scanned to the medical record. He does not anticipate any needs at [facility]. CM # placed on white board, will continue to follow.        Destination      No service coordination in this encounter.      Durable Medical Equipment      No service coordination in this encounter.      Dialysis/Infusion       No service coordination in this encounter.      Home Medical Care      No service coordination in this encounter.      Therapy      No service coordination in this encounter.      Community Resources      No service coordination in this encounter.          Demographic Summary     Row Name 05/21/19 1413       General Information    Admission Type  observation    Arrived From  home    Referral Source  admission list    Reason for Consult  discharge planning    Preferred Language  English     Used During This Interaction  no       Contact Information    Permission Granted to Share Info With          Functional Status    No documentation.       Psychosocial    No documentation.       Abuse/Neglect    No documentation.       Legal    No documentation.       Substance Abuse    No documentation.       Patient Forms    No documentation.           Sandeep Rojas RN

## 2019-05-21 NOTE — PLAN OF CARE
Problem: Pneumonia (Adult)  Intervention: Maximize Oxygenation/Ventilation/Perfusion   05/20/19 6010   Positioning   Head of Bed (HOB) HOB elevated   Respiratory Interventions   Airway/Ventilation Management pulmonary hygiene promoted  (encouraged use of Vib Pep to aide in mobilizing sec)

## 2019-05-21 NOTE — PLAN OF CARE
Problem: Chronic Obstructive Pulmonary Disease (Adult)  Intervention: Optimize Oxygenation/Ventilation/Perfusion   05/21/19 1702   Positioning   Head of Bed (HOB) HOB at 30 degrees   Respiratory Interventions   Breathing Techniques/Airway Clearance pursed-lip breathing encouraged

## 2019-05-21 NOTE — PROGRESS NOTES
"Hospitalist Team      Patient Care Team:  Camille Oliveira MD as PCP - General (Internal Medicine)  Ani Kam MD as PCP - Claims Attributed        Chief Complaint:  Cough and green sputum    Subjective    Interval History and ROS:     Patient States breathing better but chest hurts when I breath  Patient Complaints: chest pain with deep breath.  Thinks it is ribs and from coughing.  Last night oxygen saturation decreased and had to be put on oxygen.  We will do nocturnal oxygen study to see if he desaturates tonight  Patient Denies:  Nvd, abdominal pain, urgency or frequency  History taken from: patient chart RN      Objective    Vital Signs  Temp:  [97.5 °F (36.4 °C)-98.7 °F (37.1 °C)] 98.7 °F (37.1 °C)  Heart Rate:  [] 88  Resp:  [16-22] 20  BP: (104-125)/(69-74) 118/71  Oxygen Therapy  SpO2: 94 %  Pulse Oximetry Type: Continuous  Device (Oxygen Therapy): nasal cannula  Flow (L/min): 1}  Flowsheet Rows      First Filed Value   Admission Height  182.9 cm (72\") Documented at 05/20/2019 0958   Admission Weight  90.7 kg (199 lb 14.4 oz) Documented at 05/20/2019 0958        Body mass index is 27.11 kg/m².      Physical Exam:    Physical Exam   Constitutional: Patient appears well-developed and well-nourished and in no acute distress   HEENT:   Head: Normocephalic and atraumatic.   Eyes:  Pupils are equal, round, and reactive to light. EOM are intact. Sclerae are anicteric and non-injected.  Mouth and Throat: Patient has moist mucous membranes. Oropharynx is clear of any erythema or exudate.     Neck: Neck supple. No JVD present. No thyromegaly present. No lymphadenopathy present.  Cardiovascular: Regular rate, regular rhythm, S1 normal and S2 normal.  Exam reveals no gallop and no friction rub.  No murmur heard.  Pulmonary/Chest: Lungs are clear to auscultation bilaterally. No respiratory distress. No wheezes. No rhonchi. No rales.   Abdominal: Soft. Bowel sounds are normal. No distension and no " mass. There is no hepatosplenomegaly. There is no tenderness.   Musculoskeletal: Normal muscle tone  Extremities: No edema. Pulses are palpable in all 4 extremities.  Neurological: Patient is alert and oriented to person, place, and time. Cranial nerves II-XII are grossly intact with no focal deficits.  Skin: Skin is warm. No rash noted. Nails show no clubbing.  No cyanosis or erythema.          Results Review:     I reviewed the patient's new clinical results.    Lab Results (last 24 hours)     Procedure Component Value Units Date/Time    Blood Culture - Blood, Arm, Left [[id number]] Collected:  05/20/19 1316    Specimen:  Blood from Arm, Left Updated:  05/21/19 1330     Blood Culture No growth at 24 hours    Blood Culture - Blood, Arm, Right [[id number]] Collected:  05/20/19 1232    Specimen:  Blood from Arm, Right Updated:  05/21/19 1245     Blood Culture No growth at 24 hours    Respiratory Culture - Sputum, Cough [[id number]] Collected:  05/20/19 1335    Specimen:  Sputum from Cough Updated:  05/21/19 0736     Respiratory Culture Organism under investigation.       Gram Stain Moderate (3+) WBCs seen      Rare (1+) Epithelial cells seen      Moderate (3+) Mixed bacterial morphotypes seen on Gram Stain      Rare (1+) Yeast    Respiratory Panel, PCR - Swab, Nasopharynx [[id number]]  (Normal) Collected:  05/20/19 1227    Specimen:  Swab from Nasopharynx Updated:  05/20/19 1749     ADENOVIRUS, PCR Not Detected     Coronavirus 229E Not Detected     Coronavirus HKU1 Not Detected     Coronavirus NL63 Not Detected     Coronavirus OC43 Not Detected     Human Metapneumovirus Not Detected     Human Rhinovirus/Enterovirus Not Detected     Influenza B PCR Not Detected     Parainfluenza Virus 1 Not Detected     Parainfluenza Virus 2 Not Detected     Parainfluenza Virus 3 Not Detected     Parainfluenza Virus 4 Not Detected     Bordetella pertussis pcr Not Detected     Influenza A H1 2009 PCR Not Detected     Chlamydophila  pneumoniae PCR Not Detected     Mycoplasma pneumo by PCR Not Detected     Influenza A PCR Not Detected     Influenza A H3 Not Detected     Influenza A H1 Not Detected     RSV, PCR Not Detected     Bordetella parapertussis PCR Not Detected    C-reactive Protein [[id number]]  (Abnormal) Collected:  05/20/19 1231    Specimen:  Blood Updated:  05/20/19 1620     C-Reactive Protein 9.47 mg/dL     Urinalysis With Microscopic If Indicated (No Culture) - Urine, Clean Catch [[id number]]  (Abnormal) Collected:  05/20/19 1530    Specimen:  Urine, Clean Catch Updated:  05/20/19 1535     Color, UA Dark Yellow     Appearance, UA Clear     pH, UA 6.5     Specific Gravity, UA 1.015     Glucose, UA Negative     Ketones, UA Negative     Bilirubin, UA Negative     Blood, UA Negative     Protein, UA Negative     Leuk Esterase, UA Negative     Nitrite, UA Negative     Urobilinogen, UA 1.0 E.U./dL    Narrative:       Urine microscopic not indicated.          Imaging Results (last 24 hours)     ** No results found for the last 24 hours. **          Xray not reviewed personally by physician.      ECG not reviewed personally by physician  ECG/EMG Results (most recent)     Procedure Component Value Units Date/Time    ECG 12 Lead [[id number]] Collected:  05/20/19 1413     Updated:  05/20/19 1414    Narrative:       HEART RATE= 80  bpm  RR Interval= 816  ms  [place] Interval= 136  ms  P Horizontal Axis= 21  deg  P Front Axis= 40  deg  QRSD Interval= 110  ms  QT Interval= 380  ms  QRS Axis= 65  deg  T Wave Axis= 44  deg  - ABNORMAL ECG -  Sinus rhythm  Atrial premature complexes  Electronically Signed By:   Date and Time of Study: 2019-05-20 14:13:18          Medication Review:   I have reviewed the patient's current medication list    Current Facility-Administered Medications:   •  acetaminophen (TYLENOL) tablet 650 mg, 650 mg, Oral, Q4H PRN, Rahel Yarbrough DO  •  aspirin tablet 325 mg, 325 mg, Oral, Daily, Rahel Yarbrough DO, 325 mg at  05/21/19 1026  •  atorvastatin (LIPITOR) tablet 40 mg, 40 mg, Oral, Nightly, Rahel Yarbrough DO, 40 mg at 05/20/19 2121  •  clopidogrel (PLAVIX) tablet 75 mg, 75 mg, Oral, Daily, Rahel Yarbrough, , 75 mg at 05/21/19 1026  •  cyclobenzaprine (FLEXERIL) tablet 10 mg, 10 mg, Oral, Nightly, Rahel Yarbrough, , 10 mg at 05/20/19 2121  •  docusate sodium (COLACE) capsule 100 mg, 100 mg, Oral, Nightly, Rahel Yarbrough, , 100 mg at 05/20/19 2122  •  enoxaparin (LOVENOX) syringe 40 mg, 40 mg, Subcutaneous, Q24H, Rahel Yarbrough, , 40 mg at 05/21/19 1312  •  famotidine (PEPCID) tablet 40 mg, 40 mg, Oral, Daily, Rahel Yarbrough DO, 40 mg at 05/21/19 1026  •  fluticasone (FLONASE) 50 MCG/ACT nasal spray 2 spray, 2 spray, Nasal, Daily, Rahel Yarbrough DO, 2 spray at 05/21/19 1027  •  gabapentin (NEURONTIN) capsule 300 mg, 300 mg, Oral, Q12H, Rahel Yarbrough DO, 300 mg at 05/21/19 1024  •  ipratropium-albuterol (DUO-NEB) nebulizer solution 3 mL, 3 mL, Nebulization, Q4H - RT, Rahel Yarbrough DO, 3 mL at 05/21/19 1100  •  isosorbide mononitrate (IMDUR) 24 hr tablet 30 mg, 30 mg, Oral, Daily, Rahel Yarbrough DO, 30 mg at 05/21/19 1025  •  levoFLOXacin (LEVAQUIN) 750 mg/150 mL D5W (premix) (LEVAQUIN) 750 mg, 750 mg, Intravenous, Q24H, Rahel Yarbrough DO, Last Rate: 0 mL/hr at 05/20/19 1523, 750 mg at 05/21/19 1311  •  meloxicam (MOBIC) tablet 7.5 mg, 7.5 mg, Oral, Daily, Rahel Yarbrough DO, 7.5 mg at 05/21/19 1033  •  nicotine (NICODERM CQ) 21 MG/24HR patch 1 patch, 1 patch, Transdermal, Q24H, Rahel Yarbrough DO, 1 patch at 05/20/19 1346  •  ondansetron (ZOFRAN) tablet 4 mg, 4 mg, Oral, Q6H PRN **OR** ondansetron (ZOFRAN) injection 4 mg, 4 mg, Intravenous, Q6H PRN, Rahel Yarbrough DO  •  predniSONE (DELTASONE) tablet 40 mg, 40 mg, Oral, Daily With Breakfast, Derik Hillman MD, 40 mg at 05/21/19 1024  •  sodium chloride 0.9 % flush 3 mL, 3 mL, Intravenous, Q12H,  Rahel Yarbrough DO, 3 mL at 05/21/19 1027  •  sodium chloride 0.9 % flush 3-10 mL, 3-10 mL, Intravenous, PRN, Rahel Yarbrough DO  •  sodium chloride 0.9 % infusion 40 mL, 40 mL, Intravenous, PRN, Rahel Yarbrough DO  •  traMADol (ULTRAM) tablet 50 mg, 50 mg, Oral, Q8H PRN, Rahel Yarbrough DO, 50 mg at 05/21/19 0608  •  vitamin C (ASCORBIC ACID) tablet 1,000 mg, 1,000 mg, Oral, Daily, Rahel Yarbrough DO, 1,000 mg at 05/21/19 1025      Assessment/Plan           Presumed pneumonia/ Probably not per pulmonary but agrees with short course of antibiotics/ on Levaquin.     AE COPD with acute bronchitis    Acute hypoxemia/  Will get nocturnal sleep study   Will need 6 min walk at discharge.    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     Alcohol use          Plan for disposition:  Home at discharge.  Nocturnal sleep study.     Rahel Yarbrough DO  05/21/19  2:22 PM      Time: 30 min

## 2019-05-21 NOTE — PLAN OF CARE
Problem: Patient Care Overview  Goal: Plan of Care Review  Outcome: Ongoing (interventions implemented as appropriate)   05/21/19 0310   Coping/Psychosocial   Plan of Care Reviewed With patient   Plan of Care Review   Progress improving     Goal: Individualization and Mutuality  Outcome: Ongoing (interventions implemented as appropriate)      Problem: Pneumonia (Adult)  Intervention: Maximize Oxygenation/Ventilation/Perfusion   05/21/19 0310   Positioning   Head of Bed (HOB) HOB elevated   Respiratory Interventions   Airway/Ventilation Management airway patency maintained;pulmonary hygiene promoted   Incentive Spirometer (IS)   Administration (IS) self-administered

## 2019-05-22 VITALS
BODY MASS INDEX: 26.91 KG/M2 | SYSTOLIC BLOOD PRESSURE: 147 MMHG | OXYGEN SATURATION: 97 % | TEMPERATURE: 98.2 F | HEART RATE: 79 BPM | DIASTOLIC BLOOD PRESSURE: 91 MMHG | WEIGHT: 198.7 LBS | RESPIRATION RATE: 14 BRPM | HEIGHT: 72 IN

## 2019-05-22 LAB
BACTERIA SPEC RESP CULT: ABNORMAL
BACTERIA SPEC RESP CULT: ABNORMAL
GRAM STN SPEC: ABNORMAL

## 2019-05-22 PROCEDURE — 94799 UNLISTED PULMONARY SVC/PX: CPT

## 2019-05-22 PROCEDURE — 94618 PULMONARY STRESS TESTING: CPT

## 2019-05-22 PROCEDURE — 99217 PR OBSERVATION CARE DISCHARGE MANAGEMENT: CPT | Performed by: HOSPITALIST

## 2019-05-22 PROCEDURE — 63710000001 PREDNISONE PER 1 MG: Performed by: INTERNAL MEDICINE

## 2019-05-22 PROCEDURE — G0378 HOSPITAL OBSERVATION PER HR: HCPCS

## 2019-05-22 RX ORDER — ACETAMINOPHEN 325 MG/1
650 TABLET ORAL EVERY 4 HOURS PRN
Start: 2019-05-22 | End: 2023-03-15 | Stop reason: ALTCHOICE

## 2019-05-22 RX ORDER — IPRATROPIUM BROMIDE AND ALBUTEROL SULFATE 2.5; .5 MG/3ML; MG/3ML
3 SOLUTION RESPIRATORY (INHALATION)
Status: DISCONTINUED | OUTPATIENT
Start: 2019-05-22 | End: 2019-05-22 | Stop reason: HOSPADM

## 2019-05-22 RX ORDER — NICOTINE 21 MG/24HR
1 PATCH, TRANSDERMAL 24 HOURS TRANSDERMAL EVERY 24 HOURS
Qty: 28 PATCH | Refills: 0 | Status: SHIPPED | OUTPATIENT
Start: 2019-05-22 | End: 2020-03-09 | Stop reason: SDDI

## 2019-05-22 RX ORDER — IPRATROPIUM BROMIDE AND ALBUTEROL SULFATE 2.5; .5 MG/3ML; MG/3ML
3 SOLUTION RESPIRATORY (INHALATION)
Qty: 120 ML | Refills: 5 | Status: SHIPPED | OUTPATIENT
Start: 2019-05-22 | End: 2023-03-15 | Stop reason: ALTCHOICE

## 2019-05-22 RX ORDER — PREDNISONE 10 MG/1
TABLET ORAL
Qty: 22 TABLET | Refills: 0 | Status: SHIPPED | OUTPATIENT
Start: 2019-05-22 | End: 2020-03-09

## 2019-05-22 RX ORDER — BUDESONIDE AND FORMOTEROL FUMARATE DIHYDRATE 160; 4.5 UG/1; UG/1
2 AEROSOL RESPIRATORY (INHALATION) 2 TIMES DAILY
Qty: 1 INHALER | Refills: 11 | Status: SHIPPED | OUTPATIENT
Start: 2019-05-22 | End: 2020-03-09 | Stop reason: ALTCHOICE

## 2019-05-22 RX ORDER — BUDESONIDE AND FORMOTEROL FUMARATE DIHYDRATE 160; 4.5 UG/1; UG/1
2 AEROSOL RESPIRATORY (INHALATION)
Status: DISCONTINUED | OUTPATIENT
Start: 2019-05-22 | End: 2019-05-22 | Stop reason: HOSPADM

## 2019-05-22 RX ADMIN — ISOSORBIDE MONONITRATE 30 MG: 30 TABLET, EXTENDED RELEASE ORAL at 08:39

## 2019-05-22 RX ADMIN — SODIUM CHLORIDE, PRESERVATIVE FREE 3 ML: 5 INJECTION INTRAVENOUS at 08:38

## 2019-05-22 RX ADMIN — GABAPENTIN 300 MG: 300 CAPSULE ORAL at 08:39

## 2019-05-22 RX ADMIN — FAMOTIDINE 40 MG: 20 TABLET, FILM COATED ORAL at 08:39

## 2019-05-22 RX ADMIN — MELOXICAM 7.5 MG: 7.5 TABLET ORAL at 08:39

## 2019-05-22 RX ADMIN — ASPIRIN 325 MG: 325 TABLET, COATED ORAL at 08:38

## 2019-05-22 RX ADMIN — OXYCODONE HYDROCHLORIDE AND ACETAMINOPHEN 1000 MG: 500 TABLET ORAL at 08:38

## 2019-05-22 RX ADMIN — CLOPIDOGREL BISULFATE 75 MG: 75 TABLET ORAL at 08:39

## 2019-05-22 RX ADMIN — FLUTICASONE PROPIONATE 2 SPRAY: 50 SPRAY, METERED NASAL at 08:38

## 2019-05-22 RX ADMIN — PREDNISONE 40 MG: 20 TABLET ORAL at 08:39

## 2019-05-22 RX ADMIN — IPRATROPIUM BROMIDE AND ALBUTEROL SULFATE 3 ML: .5; 3 SOLUTION RESPIRATORY (INHALATION) at 07:30

## 2019-05-22 NOTE — NURSING NOTE
Case Management Discharge Note    Final Note: dc home to self care    Destination      No service has been selected for the patient.      Durable Medical Equipment - Selection Complete      Service Provider Request Status Selected Services Address Phone Number Fax Number    [facility] Selected Durable Medical Equipment [id number] [address] [phone]      Dialysis/Infusion      No service has been selected for the patient.      Home Medical Care      No service has been selected for the patient.      Therapy      No service has been selected for the patient.      Community Resources      No service has been selected for the patient.             Final Discharge Disposition Code: 01 - home or self-care

## 2019-05-22 NOTE — PLAN OF CARE
Problem: Patient Care Overview  Goal: Plan of Care Review  Outcome: Ongoing (interventions implemented as appropriate)   05/22/19 0426   Coping/Psychosocial   Plan of Care Reviewed With patient       Problem: Pneumonia (Adult)  Intervention: Maximize Oxygenation/Ventilation/Perfusion   05/22/19 0426   Respiratory Interventions   Airway/Ventilation Management airway patency maintained;pulmonary hygiene promoted

## 2019-05-22 NOTE — DISCHARGE SUMMARY
Derik Borja  1950  [id number]    Hospitalists Discharge Summary    Date of Admission: 5/20/2019  Date of Discharge:  5/22/2019    History of Present Illness:   Call from Dr. Oliveira.  It was felt that the patient had community-acquired pneumonia by the primary care physician Dr. Oliveira.  The patient had chills and cough for 2 days fevers of 102 it was worse last p.m. and he called his primary care she saw him first thing this morning and immediately called me to ask for admission.  We will put the patient as observation and get labs and if it is deemed that he has pneumonia we will change him to a full admission.  At this time we have the patient on pneumonia protocol and admission protocol and we are awaiting stat labs.  His heart rate was 125 with the fever and sats were 91% in the doctor's office he is usually running 93 to 94% he has known COPD but has never seen a pulmonary doctor per Dr. Oliveira he was having crackles and wheezing at the time she saw him in her office.  Admit direct as observation.  Patient does not have nausea or vomiting or diarrhea.  He has cough, green sputum, fever, fatigue.  Admitted the patient to the hospital and consulted pulmonary.  Did not feel patient had pneumonia/  Treated for AE copd.  Will discharge on steroid taper.  Lungs clear at discharge. Patient 97% room air.  Passed nocturnal oxygen study.  Will go home on no oxygen.  Walk test negative.    Per Pulmonary:  Continue bronchodilators for COPD exacerbation and tapering steroids.  Add Symbicort and set up with nebulizer for home use.  Fibrosis felt secondary to COPD.  Plan for follow-up as outpatient after discharge in [facility] office.  Okay from pulmonary standpoint for discharge with close follow-up.     F/U PCP in 1 week  F/U Pulmonary in 4 weeks           Hospital Course Summary/Primary Discharge Diagnoses & Secondary Discharge Diagnoses:       AE COPD with acute bronchitis     Acute hypoxemia/  Will get  nocturnal sleep study              Will need 6 min walk at discharge.     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     Alcohol use                PCP  Patient Care Team:  Camille Oliveira MD as PCP - General (Internal Medicine)  Ani Kam MD as PCP - Claims Attributed    Consults:   Consults     Date and Time Order Name Status Description    5/20/2019 1151 Inpatient Pulmonology Consult Completed           Operations and Procedures Performed:    05/22 1214 Walking Oximetry  Xr Chest 2 View    Result Date: 5/20/2019  Narrative: CHEST X-RAY, 05/20/2019     HISTORY: 69-year-old male admitted to the hospital today with shortness of air and congestion. 41 pack year smoker.  TECHNIQUE: PA and lateral upright chest series.  COMPARISON: *  Chest CT, 02/25/2019.  FINDINGS: Pulmonary emphysema with mild scattered fibrosis in the lower lungs, best demonstrated on the previous study. No visible airspace consolidation or pleural effusion. Heart size and pulmonary vascularity are within normal limits.      Impression: 1. No definite active disease. 2. Pulmonary emphysema with mild scattered lower lung fibrosis.  This report was finalized on 5/20/2019 12:50 PM by Dr. Adalberto Boggs MD.        Allergies:  is allergic to penicillins and iodine.    Boogie  Reviewed/  Gabapentin, Tramadol, and hydrocodone    Discharge Medications:     Discharge Medications      New Medications      Instructions Start Date   acetaminophen 325 MG tablet  Commonly known as:  TYLENOL   650 mg, Oral, Every 4 Hours PRN      budesonide-formoterol 160-4.5 MCG/ACT inhaler  Commonly known as:  SYMBICORT   2 puffs, Inhalation, 2 Times Daily      ipratropium-albuterol 0.5-2.5 mg/3 ml nebulizer  Commonly known as:  DUO-NEB   3 mL, Nebulization, 4 Times Daily - RT      nicotine 21 MG/24HR patch  Commonly known as:  NICODERM CQ    1 patch, Transdermal, Every 24 Hours      predniSONE 10 MG tablet  Commonly known as:  DELTASONE   Take 4 day 1 and 2, Take 3 Day 3 and 4, take 2 day 5 and 6, take 1 day 7,8,9,and 10         Changes to Medications      Instructions Start Date   clopidogrel 75 MG tablet  Commonly known as:  PLAVIX  What changed:    · how much to take  · how to take this  · when to take this   TAKE 1 TABLET BY MOUTH ONCE DAILY         Continue These Medications      Instructions Start Date   aspirin 325 MG tablet   325 mg, Oral, Daily      atorvastatin 40 MG tablet  Commonly known as:  LIPITOR   40 mg, Oral, Nightly      COLACE PO   1 tablet, Oral, Nightly      cyclobenzaprine 10 MG tablet  Commonly known as:  FLEXERIL   10 mg, Oral, Nightly      fluticasone 50 MCG/ACT nasal spray  Commonly known as:  FLONASE   2 sprays, Nasal, Daily      gabapentin 300 MG capsule  Commonly known as:  NEURONTIN   300 mg, Oral, 2 Times Daily      isosorbide mononitrate 30 MG 24 hr tablet  Commonly known as:  IMDUR   TAKE 1 TABLET BY MOUTH ONCE DAILY      meloxicam 7.5 MG tablet  Commonly known as:  MOBIC   7.5 mg, Oral, Daily      multivitamin tablet   1 tablet, Oral, Daily      traMADol 50 MG tablet  Commonly known as:  ULTRAM   1 tablet, Oral, Every 8 Hours PRN      VITAMIN C PO   1,000 mg, Oral, Daily         Stop These Medications    [facility] FISH OIL 1200 MG capsule     HYDROcodone-acetaminophen 5-325 MG per tablet  Commonly known as:  NORCO            Last Lab Results:   Lab Results (most recent)     Procedure Component Value Units Date/Time    Blood Culture - Blood, Arm, Left [[id number]] Collected:  05/20/19 1316    Specimen:  Blood from Arm, Left Updated:  05/21/19 1330     Blood Culture No growth at 24 hours    Blood Culture - Blood, Arm, Right [[id number]] Collected:  05/20/19 1232    Specimen:  Blood from Arm, Right Updated:  05/21/19 1245     Blood Culture No growth at 24 hours    Respiratory Culture - Sputum, Cough [[id number]] Collected:   05/20/19 1335    Specimen:  Sputum from Cough Updated:  05/21/19 0736     Respiratory Culture Organism under investigation.       Gram Stain Moderate (3+) WBCs seen      Rare (1+) Epithelial cells seen      Moderate (3+) Mixed bacterial morphotypes seen on Gram Stain      Rare (1+) Yeast    Respiratory Panel, PCR - Swab, Nasopharynx [[id number]]  (Normal) Collected:  05/20/19 1227    Specimen:  Swab from Nasopharynx Updated:  05/20/19 1745     ADENOVIRUS, PCR Not Detected     Coronavirus 229E Not Detected     Coronavirus HKU1 Not Detected     Coronavirus NL63 Not Detected     Coronavirus OC43 Not Detected     Human Metapneumovirus Not Detected     Human Rhinovirus/Enterovirus Not Detected     Influenza B PCR Not Detected     Parainfluenza Virus 1 Not Detected     Parainfluenza Virus 2 Not Detected     Parainfluenza Virus 3 Not Detected     Parainfluenza Virus 4 Not Detected     Bordetella pertussis pcr Not Detected     Influenza A H1 2009 PCR Not Detected     Chlamydophila pneumoniae PCR Not Detected     Mycoplasma pneumo by PCR Not Detected     Influenza A PCR Not Detected     Influenza A H3 Not Detected     Influenza A H1 Not Detected     RSV, PCR Not Detected     Bordetella parapertussis PCR Not Detected    C-reactive Protein [[id number]]  (Abnormal) Collected:  05/20/19 1231    Specimen:  Blood Updated:  05/20/19 1620     C-Reactive Protein 9.47 mg/dL     Urinalysis With Microscopic If Indicated (No Culture) - Urine, Clean Catch [[id number]]  (Abnormal) Collected:  05/20/19 1530    Specimen:  Urine, Clean Catch Updated:  05/20/19 1535     Color, UA Dark Yellow     Appearance, UA Clear     pH, UA 6.5     Specific Gravity, UA 1.015     Glucose, UA Negative     Ketones, UA Negative     Bilirubin, UA Negative     Blood, UA Negative     Protein, UA Negative     Leuk Esterase, UA Negative     Nitrite, UA Negative     Urobilinogen, UA 1.0 E.U./dL    Narrative:       Urine microscopic not indicated.    BNP [[id number]]   (Normal) Collected:  05/20/19 1230    Specimen:  Blood Updated:  05/20/19 1303     proBNP 136.0 pg/mL     Narrative:       Among patients with dyspnea, NT-proBNP is highly sensitive for the detection of acute congestive heart failure. In addition NT-proBNP of <300 pg/ml effectively rules out acute congestive heart failure with 99% negative predictive value.    TSH [[id number]]  (Normal) Collected:  05/20/19 1231    Specimen:  Blood Updated:  05/20/19 1302     TSH 1.170 mIU/mL     Procalcitonin [[id number]]  (Abnormal) Collected:  05/20/19 1231    Specimen:  Blood Updated:  05/20/19 1302     Procalcitonin 0.03 ng/mL     Narrative:       As a Marker for Sepsis (Non-Neonates):   1. <0.5 ng/mL represents a low risk of severe sepsis and/or septic shock.  2. >2 ng/mL represents a high risk of severe sepsis and/or septic shock.    As a Marker for Lower Respiratory Tract Infections that require antibiotic therapy:    PCT on Admission     Antibiotic Therapy       6-12 Hrs later  > 0.5                Strongly Recommended             >0.25 - <0.5         Recommended  0.1 - 0.25           Discouraged              Remeasure/reassess PCT  <0.1                 Strongly Discouraged     Remeasure/reassess PCT                     PCT values of < 0.5 ng/mL do not exclude an infection, because localized infections (without systemic signs) may be associated with such low concentrations, or a systemic infection in its initial stages (< 6 hours). Furthermore, increased PCT can occur without infection. PCT concentrations between 0.5 and 2.0 ng/mL should be interpreted taking into account the patient's history. It is recommended to retest PCT within 6-24 hours if any concentrations < 2 ng/mL are obtained.    Comprehensive Metabolic Panel [[id number]]  (Abnormal) Collected:  05/20/19 1231    Specimen:  Blood Updated:  05/20/19 1259     Glucose 95 mg/dL      BUN 15 mg/dL      Creatinine 0.58 mg/dL      Sodium 137 mmol/L      Potassium 4.1 mmol/L       Chloride 101 mmol/L      CO2 23.9 mmol/L      Calcium 9.4 mg/dL      Total Protein 7.6 g/dL      Albumin 3.80 g/dL      ALT (SGPT) 21 U/L      AST (SGOT) 34 U/L      Comment: Specimen hemolyzed.  Results may be affected.        Alkaline Phosphatase 72 U/L      Total Bilirubin 0.7 mg/dL      eGFR Non African Amer 139 mL/min/1.73      Globulin 3.8 gm/dL      A/G Ratio 1.0 g/dL      BUN/Creatinine Ratio 25.9     Anion Gap 12.1 mmol/L     Narrative:       GFR Normal >60  Chronic Kidney Disease <60  Kidney Failure <15    Magnesium [[id number]]  (Normal) Collected:  05/20/19 1231    Specimen:  Blood Updated:  05/20/19 1257     Magnesium 2.1 mg/dL     Phosphorus [[id number]]  (Normal) Collected:  05/20/19 1231    Specimen:  Blood Updated:  05/20/19 1257     Phosphorus 2.9 mg/dL     CBC & Differential [[id number]] Collected:  05/20/19 1231    Specimen:  Blood Updated:  05/20/19 1256    Narrative:       The following orders were created for panel order CBC & Differential.  Procedure                               Abnormality         Status                     ---------                               -----------         ------                     Manual Differential[[id number]]          Abnormal            Final result               CBC Auto Differential[[id number]]        Abnormal            Final result                 Please view results for these tests on the individual orders.    Manual Differential [[id number]]  (Abnormal) Collected:  05/20/19 1231    Specimen:  Blood Updated:  05/20/19 1256     Neutrophil % 79.0 %      Lymphocyte % 6.0 %      Monocyte % 6.0 %      Eosinophil % 1.0 %      Basophil % 1.0 %      Bands %  0.0 %      Metamyelocyte % 0.0 %      Myelocyte % 0.0 %      Promyelocyte % 0.0 %      Atypical Lymphocyte % 7.0 %      Blasts % 0.0 %      Plasma Cells % 0.0 %      Prolymphocyte % 0.0 %      Other Cells % 0.0 %      Neutrophils Absolute 9.06 10*3/mm3      Lymphocytes Absolute 0.69 10*3/mm3      Monocytes  Absolute 0.69 10*3/mm3      Eosinophils Absolute 0.11 10*3/mm3      Basophils Absolute 0.11 10*3/mm3      RBC Morphology Normal     WBC Morphology Normal     Platelet Morphology Normal    Hemoglobin A1c [[id number]]  (Normal) Collected:  05/20/19 1231    Specimen:  Blood Updated:  05/20/19 1253     Hemoglobin A1C 4.90 %     Narrative:       Hemoglobin A1C Ranges:    Increased Risk for Diabetes  5.7% to 6.4%  Diabetes                     >= 6.5%  Diabetic Goal                < 7.0%    aPTT [[id number]]  (Normal) Collected:  05/20/19 1231    Specimen:  Blood Updated:  05/20/19 1246     PTT 30.9 seconds     Narrative:       PTT = The equivalent PTT values for the therapeutic range of heparin levels at 0.1 to 0.7 U/ml are 53 to 110 seconds.    Protime-INR [[id number]]  (Normal) Collected:  05/20/19 1231    Specimen:  Blood Updated:  05/20/19 1246     Protime 13.4 Seconds      INR 1.05    Narrative:       Therapeutic Ranges for INR: 2.0-3.0 (PT 20-30)                              2.5-3.5 (PT 25-34)    CBC Auto Differential [[id number]]  (Abnormal) Collected:  05/20/19 1231    Specimen:  Blood Updated:  05/20/19 1241     WBC 11.47 10*3/mm3      RBC 3.38 10*6/mm3      Hemoglobin 11.0 g/dL      Hematocrit 33.8 %      .0 fL      MCH 32.5 pg      MCHC 32.5 g/dL      RDW 13.1 %      RDW-SD 47.6 fl      MPV 9.7 fL      Platelets 128 10*3/mm3     Sedimentation Rate [[id number]]  (Normal) Collected:  05/20/19 1231    Specimen:  Blood Updated:  05/20/19 1241     Sed Rate 17 mm/hr     Blood Gas, Arterial [[id number]]  (Abnormal) Collected:  05/20/19 1223    Specimen:  Arterial Blood Updated:  05/20/19 1226     Site Left Brachial     Andrés's Test N/A     pH, Arterial 7.437 pH units      pCO2, Arterial 36.6 mm Hg      pO2, Arterial 68.5 mm Hg      Comment: 84 Value below reference range        HCO3, Arterial 24.6 mmol/L      Base Excess, Arterial 0.7 mmol/L      O2 Saturation, Arterial 95.3 %      Hemoglobin, Blood Gas 15.2 g/dL       Temperature 37.0 C      Barometric Pressure for Blood Gas 740 mmHg      Modality Room Air     Ventilator Mode NA     Comment: Meter: [id number]     :  [id number]        pCO2, Temperature Corrected 36.6 mm Hg      pH, Temp Corrected 7.437 pH Units      pO2, Temperature Corrected 68.5 mm Hg         Imaging Results (most recent)     Procedure Component Value Units Date/Time    XR Chest 2 View [[id number]] Collected:  05/20/19 1249     Updated:  05/20/19 1252    Narrative:       CHEST X-RAY, 05/20/2019         HISTORY:  69-year-old male admitted to the hospital today with shortness of air  and congestion. 41 pack year smoker.     TECHNIQUE:  PA and lateral upright chest series.     COMPARISON:  *  Chest CT, 02/25/2019.     FINDINGS:  Pulmonary emphysema with mild scattered fibrosis in the lower lungs,  best demonstrated on the previous study. No visible airspace  consolidation or pleural effusion. Heart size and pulmonary vascularity  are within normal limits.       Impression:       1. No definite active disease.  2. Pulmonary emphysema with mild scattered lower lung fibrosis.     This report was finalized on 5/20/2019 12:50 PM by Dr. Adalberto Boggs MD.             PROCEDURES      Condition on Discharge:  Stable    Physical Exam at Discharge  Vital Signs  Temp:  [97.8 °F (36.6 °C)-98.6 °F (37 °C)] 98.2 °F (36.8 °C)  Heart Rate:  [] 79  Resp:  [14-24] 14  BP: (119-147)/(77-91) 147/91    Physical Exam:  Physical Exam   Constitutional: Patient appears well-developed and well-nourished and in no acute distress   HEENT:   Head: Normocephalic and atraumatic.   Eyes:  Pupils are equal, round, and reactive to light. EOM are intact. Sclera are anicteric and non-injected.  Mouth and Throat: Patient has moist mucous membranes. Oropharynx is clear of any erythema or exudate.     Neck: Neck supple. No JVD present. No thyromegaly present. No lymphadenopathy present.  Cardiovascular: Regular rate, regular  rhythm, S1 normal and S2 normal.  Exam reveals no gallop and no friction rub.  No murmur heard.  Pulmonary/Chest: Lungs are clear to auscultation bilaterally. No respiratory distress. No wheezes. No rhonchi. No rales.   Abdominal: Soft. Bowel sounds are normal. No distension and no mass. There is no hepatosplenomegaly. There is no tenderness.   Musculoskeletal: Normal Muscle tone  Extremities: No edema. Pulses are palpable in all 4 extremities.  Neurological: Patient is alert and oriented to person, place, and time. Cranial nerves II-XII are grossly intact with no focal deficits.  Skin: Skin is warm. No rash noted. Nails show no clubbing.  No cyanosis or erythema.    Discharge Disposition  home    Visiting Nurse:    No     Home PT/OT:  No     Home Safety Evaluation:  No     DME  Nebulizer    Discharge Diet:      Dietary Orders (From admission, onward)    Start     Ordered    05/20/19 1138  Diet Regular; Cardiac  Diet Effective Now     Question Answer Comment   Diet Texture / Consistency Regular    Common Modifiers Cardiac        05/20/19 1151          Activity at Discharge:  As tolerated    Pre-discharge education  Nebulizer use  Review medication  Review appointments      Follow-up Appointments  Future Appointments   Date Time Provider Department Center   3/2/2020  9:15 AM Ani Kam MD [facility] None     Additional Instructions for the Follow-ups that You Need to Schedule     Discharge Follow-up with PCP   As directed       Currently Documented PCP:    Camille Oliveira MD    PCP Phone Number:    [phone]     Follow Up Details:  1 week for post discharge follow up.         Discharge Follow-up with Specialty: [place] [facility] in 4 weeks for post hospital follow up; 1 Month   As directed      Specialty:  [place] [facility] in 4 weeks for post hospital follow up    Follow Up:  1 Month    Follow Up Details:  pulmonary office in [place]               Test Results Pending at  Discharge   Order Current Status    Blood Culture - Blood, Arm, Left Preliminary result    Blood Culture - Blood, Arm, Right Preliminary result    Respiratory Culture - Sputum, Cough Preliminary result           Rahel Yarbrough DO  05/22/19  12:30 PM    Time: 45 min (if over 30 minutes give explanation as to why it took greater than 30 minutes)    Worked with nursing and respiratory and discharge planners to get medication correct and nebulizer and walk test done and results of nocturnal study.

## 2019-05-22 NOTE — PLAN OF CARE
Problem: Patient Care Overview  Goal: Plan of Care Review  Outcome: Ongoing (interventions implemented as appropriate)   05/22/19 0431   Coping/Psychosocial   Plan of Care Reviewed With patient   Plan of Care Review   Progress improving   OTHER   Outcome Summary Currently on room air, oxygen sleep study is in progress; IV is saline locked; no pain reported; sleeping at this time       Problem: Chronic Obstructive Pulmonary Disease (Adult)  Goal: Signs and Symptoms of Listed Potential Problems Will be Absent, Minimized or Managed (Chronic Obstructive Pulmonary Disease)  Outcome: Ongoing (interventions implemented as appropriate)   05/22/19 0431   Goal/Outcome Evaluation   Problems Assessed (Chronic Obstructive Pulmonary Disease (COPD)) all   Problems Present (COPD, Bronch/Emphy) none       Problem: Pain, Chronic (Adult)  Goal: Acceptable Pain/Comfort Level and Functional Ability  Outcome: Ongoing (interventions implemented as appropriate)   05/22/19 0431   Pain, Chronic (Adult)   Acceptable Pain/Comfort Level and Functional Ability making progress toward outcome

## 2019-05-22 NOTE — PLAN OF CARE
Problem: Patient Care Overview  Goal: Plan of Care Review  Outcome: Outcome(s) achieved Date Met: 05/22/19      Problem: Chronic Obstructive Pulmonary Disease (Adult)  Goal: Signs and Symptoms of Listed Potential Problems Will be Absent, Minimized or Managed (Chronic Obstructive Pulmonary Disease)  Outcome: Outcome(s) achieved Date Met: 05/22/19      Problem: Pain, Chronic (Adult)  Goal: Acceptable Pain/Comfort Level and Functional Ability  Outcome: Outcome(s) achieved Date Met: 05/22/19

## 2019-05-22 NOTE — PROCEDURES
Exercise Oximetry    Patient Name:Derik Borja   MRN: [id number]   Date: 05/22/19             ROOM AIR BASELINE   SpO2% 97   Heart Rate 95   Blood Pressure      EXERCISE ON ROOM AIR SpO2% EXERCISE ON O2 @  LPM SpO2%   1 MINUTE 96 1 MINUTE    2 MINUTES 95 2 MINUTES    3 MINUTES 95 3 MINUTES    4 MINUTES 94 4 MINUTES    5 MINUTES 96 5 MINUTES    6 MINUTES 96 6 MINUTES               Distance Walked  485ft Distance Walked   Dyspnea (Charmaine Scale)   Dyspnea (Charmaine Scale)   Fatigue (Charmaine Scale)   Fatigue (Charmaine Scale)   SpO2% Post Exercise  98 SpO2% Post Exercise   HR Post Exercise  102 HR Post Exercise   Time to Recovery   Time to Recovery     Comments:

## 2019-05-22 NOTE — PROGRESS NOTES
LPC INPATIENT PROGRESS NOTE         [facility] MED SURG    2019      PATIENT IDENTIFICATION:  Name: Derik Borja ADMIT: 2019   : 1950  PCP: Camille Oliveira MD    MRN: [id number] LOS: 0 days   AGE/SEX: 69 y.o. male  ROOM: formerly [facility]                     LOS 0    Reason for visit: Follow-up COPD exacerbation with bronchitis and pulmonary fibrosis      SUBJECTIVE:      Says that he’s feeling much better. Not requiring any supplemental oxygen. No chest pain. Productive cough has improved. Hoping to go home today. Plan for follow up with us in the office after discharge. OK for discharge from my standpoint.    Objective   OBJECTIVE:    Vital Sign Min/Max for last 24 hours  Temp  Min: 97.8 °F (36.6 °C)  Max: 98.7 °F (37.1 °C)   BP  Min: 118/71  Max: 147/91   Pulse  Min: 79  Max: 111   Resp  Min: 14  Max: 24   SpO2  Min: 91 %  Max: 97 %   No Data Recorded   Weight  Min: 90.1 kg (198 lb 11.2 oz)  Max: 90.1 kg (198 lb 11.2 oz)                         Body mass index is 26.95 kg/m².    Intake/Output Summary (Last 24 hours) at 2019 0844  Last data filed at 2019 1900  Gross per 24 hour   Intake 1350 ml   Output --   Net 1350 ml         Exam:  GEN:  No distress, appears stated age  EYES:   PERRL, anicteric sclera  ENT:    External ears/nose normal, OP clear  NECK:  No adenopathy, midline trachea  LUNGS: Normal chest on inspection, palpation and diminished bilaterally on auscultation  CV:  Normal S1S2, without murmur  ABD:  Non tender, non distended, no hepatosplenomegaly, +BS  EXT:  No edema, cyanosis or clubbing    Scheduled meds:    aspirin 325 mg Oral Daily   atorvastatin 40 mg Oral Nightly   clopidogrel 75 mg Oral Daily   cyclobenzaprine 10 mg Oral Nightly   docusate sodium 100 mg Oral Nightly   enoxaparin 40 mg Subcutaneous Q24H   famotidine 40 mg Oral Daily   fluticasone 2 spray Nasal Daily   gabapentin 300 mg Oral Q12H   ipratropium-albuterol 3 mL Nebulization Q4H - RT    isosorbide mononitrate 30 mg Oral Daily   levoFLOXacin 750 mg Intravenous Q24H   meloxicam 7.5 mg Oral Daily   nicotine 1 patch Transdermal Q24H   predniSONE 40 mg Oral Daily With Breakfast   sodium chloride 3 mL Intravenous Q12H   vitamin C 1,000 mg Oral Daily     IV meds:                         Data Review:  Results from last 7 days   Lab Units 05/20/19  1231   SODIUM mmol/L 137   POTASSIUM mmol/L 4.1   CHLORIDE mmol/L 101   CO2 mmol/L 23.9   BUN mg/dL 15   CREATININE mg/dL 0.58*   GLUCOSE mg/dL 95   CALCIUM mg/dL 9.4         Estimated Creatinine Clearance: 111.1 mL/min (A) (by C-G formula based on SCr of 0.58 mg/dL (L)).  Results from last 7 days   Lab Units 05/20/19  1231   WBC 10*3/mm3 11.47*   HEMOGLOBIN g/dL 11.0*   PLATELETS 10*3/mm3 128*     Results from last 7 days   Lab Units 05/20/19  1231   INR  1.05     Results from last 7 days   Lab Units 05/20/19  1231   ALT (SGPT) U/L 21   AST (SGOT) U/L 34     Results from last 7 days   Lab Units 05/20/19  1223   PH, ARTERIAL pH units 7.437   PO2 ART mm Hg 68.5*   PCO2, ARTERIAL mm Hg 36.6   HCO3 ART mmol/L 24.6     Results from last 7 days   Lab Units 05/20/19  1231   PROCALCITONIN ng/mL 0.03*             )Assessment   ASSESSMENT:      Active Hospital Problems    Diagnosis  POA   • Pneumonia [J18.9]  Yes      Resolved Hospital Problems   No resolved problems to display.     Acute exacerbation COPD  Acute bronchitis  Basilar mild fibrosis related to COPD  Acute hypoxemia  Persistent smoker  Alcohol use          PLAN:  Continue bronchodilators for COPD exacerbation and tapering steroids.  Add Symbicort and set up with nebulizer for home use.  Fibrosis felt secondary to COPD.  Plan for follow-up as outpatient after discharge in [facility] office.  Okay from pulmonary standpoint for discharge with close follow-up.    Derik Hillman MD  Pulmonary and Critical Care Medicine  [facility], [facility]  5/22/2019    8:44 AM

## 2019-05-22 NOTE — DISCHARGE INSTR - APPOINTMENTS
SPOKE TO JIMBO AT [facility] HAVE PATIENT CALL IN 3-4 DAYS TO MAKE APPOINTMENT IN 4 WEEKS [phone]    FOLLOW UP WITHDR. CHAPPELL MAY 30TH AT 1 PM

## 2019-05-22 NOTE — NURSING NOTE
Continued Stay Note  MAITE Roman     Patient Name: Derik Borja  MRN: [id number]  Today's Date: 5/22/2019    Admit Date: 5/20/2019    Discharge Plan     Row Name 05/22/19 1020       Plan    Plan  plan home, assess needs    Patient/Family in Agreement with Plan  yes    Plan Comments  Nebulizer provided to patient per Eloise, paper work completed. Patient declines needing RT demonstration on use prior to dc. Will continue to follow.        Discharge Codes    No documentation.             Sandeep Rojas RN

## 2019-05-23 ENCOUNTER — READMISSION MANAGEMENT (OUTPATIENT)
Dept: CALL CENTER | Facility: HOSPITAL | Age: 69
End: 2019-05-23

## 2019-05-23 RX ORDER — ISOSORBIDE MONONITRATE 30 MG/1
TABLET, EXTENDED RELEASE ORAL
Qty: 90 TABLET | Refills: 3 | Status: SHIPPED | OUTPATIENT
Start: 2019-05-23 | End: 2020-03-09 | Stop reason: SDUPTHER

## 2019-05-23 NOTE — OUTREACH NOTE
Prep Survey      Responses   Facility patient discharged from?  [facility]   Is LACE score < 7 ?  Yes   Is patient eligible?  Yes   Discharge diagnosis  AE COPD   Does the patient have one of the following disease processes/diagnoses(primary or secondary)?  Other [AE COPD LACE <7]   Does the patient have Home health ordered?  No   Is there a DME ordered?  No   Comments regarding appointments  See AVS   Prep survey completed?  Yes          Trinity Doherty RN

## 2019-05-24 ENCOUNTER — READMISSION MANAGEMENT (OUTPATIENT)
Dept: CALL CENTER | Facility: HOSPITAL | Age: 69
End: 2019-05-24

## 2019-05-24 NOTE — OUTREACH NOTE
LAG < 7 Survey      Responses   Facility patient discharged from?  [facility]   Does the patient have one of the following disease processes/diagnoses(primary or secondary)?  Other   Is there a successful TCM telephone encounter documented?  No   BHLAG <7 Attempt successful?  Yes   Call start time  1513   Call end time  1515   Discharge diagnosis  AE COPD   Meds reviewed with patient/caregiver?  Yes   Does the patient have all medications ordered at discharge?  Yes   Comments regarding appointments  See AVS   Does the patient have a primary care provider?   Yes   Comments regarding PCP  Camille Oliveira, KAREN   Has the patient kept scheduled appointments due by today?  Yes   Comments  Patient has all appts arranged except pulm in three weeks   Psychosocial issues?  No   Did the patient receive a copy of their discharge instructions?  Yes   Graduated  Yes   Wrap up additional comments  Call was very brief due to patient driving          Olivia Herrera RN

## 2019-05-25 LAB
BACTERIA SPEC AEROBE CULT: NORMAL
BACTERIA SPEC AEROBE CULT: NORMAL

## 2019-06-03 RX ORDER — CLOPIDOGREL BISULFATE 75 MG/1
TABLET ORAL
Qty: 90 TABLET | Refills: 2 | Status: SHIPPED | OUTPATIENT
Start: 2019-06-03 | End: 2020-03-09

## 2019-07-22 ENCOUNTER — HOSPITAL ENCOUNTER (OUTPATIENT)
Dept: PULMONOLOGY | Facility: HOSPITAL | Age: 69
Discharge: HOME | End: 2019-07-22
Admitting: INTERNAL MEDICINE

## 2019-07-22 ENCOUNTER — TRANSCRIBE ORDERS (OUTPATIENT)
Dept: ADMINISTRATIVE | Facility: HOSPITAL | Age: 69
End: 2019-07-22

## 2019-07-22 VITALS — OXYGEN SATURATION: 93 % | HEART RATE: 92 BPM | RESPIRATION RATE: 16 BRPM

## 2019-07-22 DIAGNOSIS — R06.09 DOE (DYSPNEA ON EXERTION): Primary | ICD-10-CM

## 2019-07-22 PROCEDURE — 94726 PLETHYSMOGRAPHY LUNG VOLUMES: CPT

## 2019-07-22 PROCEDURE — 94060 EVALUATION OF WHEEZING: CPT

## 2019-07-22 PROCEDURE — 94729 DIFFUSING CAPACITY: CPT

## 2019-07-22 RX ORDER — ALBUTEROL SULFATE 2.5 MG/3ML
2.5 SOLUTION RESPIRATORY (INHALATION) ONCE
Status: COMPLETED | OUTPATIENT
Start: 2019-07-22 | End: 2019-07-22

## 2019-07-22 RX ADMIN — ALBUTEROL SULFATE 2.5 MG: 2.5 SOLUTION RESPIRATORY (INHALATION) at 17:31

## 2020-03-09 ENCOUNTER — OFFICE VISIT (OUTPATIENT)
Dept: CARDIOLOGY | Facility: CLINIC | Age: 70
End: 2020-03-09

## 2020-03-09 VITALS
WEIGHT: 209 LBS | HEIGHT: 72 IN | BODY MASS INDEX: 28.31 KG/M2 | HEART RATE: 65 BPM | SYSTOLIC BLOOD PRESSURE: 120 MMHG | DIASTOLIC BLOOD PRESSURE: 64 MMHG

## 2020-03-09 DIAGNOSIS — I25.10 MILD CORONARY ARTERY DISEASE: Primary | ICD-10-CM

## 2020-03-09 DIAGNOSIS — Z72.0 TOBACCO USE: ICD-10-CM

## 2020-03-09 DIAGNOSIS — E78.5 HYPERLIPIDEMIA, UNSPECIFIED HYPERLIPIDEMIA TYPE: ICD-10-CM

## 2020-03-09 PROCEDURE — 93000 ELECTROCARDIOGRAM COMPLETE: CPT | Performed by: INTERNAL MEDICINE

## 2020-03-09 PROCEDURE — 99214 OFFICE O/P EST MOD 30 MIN: CPT | Performed by: INTERNAL MEDICINE

## 2020-03-09 RX ORDER — CLOPIDOGREL BISULFATE 75 MG/1
TABLET ORAL
Qty: 90 TABLET | Refills: 0 | Status: SHIPPED | OUTPATIENT
Start: 2020-03-09 | End: 2020-06-05

## 2020-03-09 RX ORDER — ISOSORBIDE MONONITRATE 30 MG/1
30 TABLET, EXTENDED RELEASE ORAL 2 TIMES DAILY
Qty: 180 TABLET | Refills: 3 | Status: SHIPPED | OUTPATIENT
Start: 2020-03-09 | End: 2020-05-13

## 2020-03-09 NOTE — PROGRESS NOTES
Date of Office Visit: 2020  Encounter Provider: Ani Kam MD  Place of Service: [facility] CARDIOLOGY  Patient Name: Derik Borja  :1950      Patient ID:  Derik Borja is a 69 y.o. male is here for  followup for CAD, PAD.         History of Present Illness    When we first met, he was having progressive difficulty with his legs  when he is walking. He also had to stop because of severe burning and aching in his  legs, as well as significant dyspnea with exertion which seems to be  progressive and definitely gets better with rest. He was found to have COPD on a chest x-ray.      With his complaints, he had a 2-D echocardiogram with Doppler  done at [facility] on 2013. This showed ejection fraction of  55-60%, aortic valve calcification, and trace mitral insufficiency. He had a Lexiscan and  stress nuclear perfusion study which showed a small amount of inferior ischemia. Because  of his symptoms of claudication and his positive testing showing moderate occlusive  Disease.  He saw Dr. Brambila in consultation. He saw him on 2013, and set  him for an angiogram which was done on 2013. He did the coronary angiogram which  showed 30% diffuse left anterior descending stenosis, focal 70% large first diagonal  stenosis, 30% mid circumflex stenosis, and 30% right coronary artery stenosis. The left  main coronary artery was short. The right coronary artery was dominant. His abdominal  aorta was smooth and no significant stenosis. The left renal artery was single with a  40-50% proximal stenosis. I do not see a comment here about the right renal artery. The  common iliac arteries had significant disease on the left. The left had a focal 90%  stenosis. The right common iliac had a 30% stenosis. The right internal iliac artery was  patent. The left internal iliac artery was occluded. The superficial femoral artery was  occluded on the left along its  entire length and reconstituted with collaterals at the  profunda. The common femoral artery had moderate, diffuse disease. There was brisk,  three-vessel runoff to the foot. The right common femoral artery had mild disease. The  right superficial femoral artery was occluded for its entire length and reconstituted with  brisk profunda collaterals, and there was three-vessel runoff to the foot. He then  underwent stenting of the left common iliac artery receiving an 8.0 mm x 39 mm Omnilink  Elite stent. His SFA occlusions have been treated medically.         He did have a lymph node removed in his neck in 2012.   His wife Holley is a cousin to Merrick Mccloud, who are also my patients.   He has been smoking a pack a day for over 20 years. He and his wife are both in the  process of trying to quit.      He saw Dr. Brambila on 07/15/2013. He has residual claudication in the right leg. Dr. Brambila said that that can only fixed with a femoral popliteal bypass surgery. The patient is  not interested in that at this time. He does continue to smoke.      Labs on 2019 show normal CMP, normal TSH, normal hemoglobin A1c.  Stress nuclear perfusion study done 2019 shows no evidence of ischemia.    He has frequent PACs but really has no symptoms associated with this.  He has some dizziness when he abruptly changes positions but has not noticed any dizziness or heart racing without positional changes.  His brother recently  in [place].  He quit smoking up to that point but then restarted again.  He is can try to stop smoking again.  He has noticed occasional chest pressure which is relieved with Imdur.  It never lasts and is predictable.  He has had no increase in claudication symptoms.  He denies strokelike symptoms.  He has had no fevers, chills or cough.  He has no orthopnea or PND.  He works 50 hours weekly.  He does not routinely exercise.    Addendum: Labs on 20 show HDL 36, triglycerides 1 7, LDL 81,  normal CMP, normal TSH, normal CBC.    Past Medical History:   Diagnosis Date   • Angina pectoris (CMS/[facility])    • CAD (coronary artery disease)     3/13   • DOMINGUEZ (dyspnea on exertion)    • Hyperlipidemia    • Leg pain    • PAD (peripheral artery disease) (CMS/HCC)    • PVC (premature ventricular contraction)    • PVD (peripheral vascular disease) (CMS/[facility])          Past Surgical History:   Procedure Laterality Date   • ILIAC ARTERY STENT  03/2013   • SHOULDER SURGERY  2013       Current Outpatient Medications on File Prior to Visit   Medication Sig Dispense Refill   • acetaminophen (TYLENOL) 325 MG tablet Take 2 tablets by mouth Every 4 (Four) Hours As Needed for Mild Pain .     • Ascorbic Acid (VITAMIN C PO) Take 1,000 mg by mouth Daily.     • aspirin 325 MG tablet Take 325 mg by mouth Daily.     • atorvastatin (LIPITOR) 40 MG tablet Take 40 mg by mouth Every Night.     • clopidogrel (PLAVIX) 75 MG tablet TAKE 1 TABLET BY MOUTH ONCE DAILY 90 tablet 2   • cyclobenzaprine (FLEXERIL) 10 MG tablet Take 10 mg by mouth Every Night.     • Docusate Sodium (COLACE PO) Take 1 tablet by mouth Every Night.     • fluticasone (FLONASE) 50 MCG/ACT nasal spray 2 sprays into the nostril(s) as directed by provider Daily.     • ipratropium-albuterol (DUO-NEB) 0.5-2.5 mg/3 ml nebulizer Take 3 mL by nebulization 4 (Four) Times a Day. 120 mL 5   • isosorbide mononitrate (IMDUR) 30 MG 24 hr tablet TAKE 1 TABLET BY MOUTH ONCE DAILY 90 tablet 3   • meloxicam (MOBIC) 7.5 MG tablet Take 7.5 mg by mouth Daily.     • Multiple Vitamin (MULTIVITAMIN) tablet Take 1 tablet by mouth daily.     • traMADol (ULTRAM) 50 MG tablet Take 1 tablet by mouth Every 8 (Eight) Hours As Needed.     • [DISCONTINUED] budesonide-formoterol (SYMBICORT) 160-4.5 MCG/ACT inhaler Inhale 2 puffs 2 (Two) Times a Day. 1 inhaler 11   • [DISCONTINUED] gabapentin (NEURONTIN) 300 MG capsule Take 300 mg by mouth 2 (Two) Times a Day.     • [DISCONTINUED] nicotine (NICODERM CQ) 21  MG/24HR patch Place 1 patch on the skin as directed by provider Daily. 28 patch 0   • [DISCONTINUED] predniSONE (DELTASONE) 10 MG tablet Take 4 day 1 and 2, Take 3 Day 3 and 4, take 2 day 5 and 6, take 1 day 7,8,9,and 10 22 tablet 0     No current facility-administered medications on file prior to visit.        Social History     Socioeconomic History   • Marital status:      Spouse name: Not on file   • Number of children: Not on file   • Years of education: Not on file   • Highest education level: Not on file   Tobacco Use   • Smoking status: Current Every Day Smoker     Packs/day: 0.50     Years: 35.00     Pack years: 17.50     Types: Cigarettes   • Smokeless tobacco: Never Used   • Tobacco comment: caffeine use - 3 cups coffee daily   Substance and Sexual Activity   • Alcohol use: Yes     Drinks per session: 1 or 2     Binge frequency: Monthly   • Drug use: Yes     Types: Marijuana           Review of Systems   Constitution: Negative.   HENT: Negative for congestion.    Eyes: Negative for vision loss in left eye and vision loss in right eye.   Respiratory: Negative.  Negative for cough, hemoptysis, shortness of breath, sleep disturbances due to breathing, snoring, sputum production and wheezing.    Endocrine: Negative.    Hematologic/Lymphatic: Negative.    Skin: Negative for poor wound healing and rash.   Musculoskeletal: Negative for falls, gout, muscle cramps and myalgias.   Gastrointestinal: Negative for abdominal pain, diarrhea, dysphagia, hematemesis, melena, nausea and vomiting.   Neurological: Negative for excessive daytime sleepiness, dizziness, headaches, light-headedness, loss of balance, seizures and vertigo.   Psychiatric/Behavioral: Negative for depression and substance abuse. The patient is not nervous/anxious.        Procedures    ECG 12 Lead  Date/Time: 3/9/2020 10:14 AM  Performed by: Ani Kam MD  Authorized by: Ani Kam MD   Comparison: compared with previous  "ECG   Similar to previous ECG  Rhythm: sinus rhythm  Ectopy: atrial premature contractions    Clinical impression: normal ECG                Objective:      Vitals:    03/09/20 0934   BP: 120/64   BP Location: Right arm   Patient Position: Sitting   Pulse: 65   Weight: 94.8 kg (209 lb)   Height: 182.9 cm (72\")     Body mass index is 28.35 kg/m².    Physical Exam   Constitutional: He is oriented to person, place, and time. He appears well-developed and well-nourished. No distress.   HENT:   Head: Normocephalic and atraumatic.   Eyes: Conjunctivae are normal. No scleral icterus.   Neck: Neck supple. No JVD present. Carotid bruit is not present. No thyromegaly present.   Cardiovascular: Normal rate, regular rhythm, S1 normal, S2 normal and intact distal pulses.  No extrasystoles are present. PMI is not displaced. Exam reveals no gallop.   No murmur heard.  Pulses:       Carotid pulses are 2+ on the right side, and 2+ on the left side.       Radial pulses are 2+ on the right side, and 2+ on the left side.        Dorsalis pedis pulses are 2+ on the right side, and 2+ on the left side.        Posterior tibial pulses are 2+ on the right side, and 2+ on the left side.   Pulmonary/Chest: Effort normal and breath sounds normal. No respiratory distress. He has no wheezes. He has no rhonchi. He has no rales. He exhibits no tenderness.   Abdominal: Soft. Bowel sounds are normal. He exhibits no distension, no abdominal bruit and no mass. There is no tenderness.   Musculoskeletal: He exhibits no edema or deformity.   Lymphadenopathy:     He has no cervical adenopathy.   Neurological: He is alert and oriented to person, place, and time. No cranial nerve deficit.   Skin: Skin is warm and dry. No rash noted. He is not diaphoretic. No cyanosis. No pallor. Nails show no clubbing.   Psychiatric: He has a normal mood and affect. Judgment normal.   Vitals reviewed.      Lab Review:       Assessment:      Diagnosis Plan   1. Mild coronary " artery disease     2. Hyperlipidemia, unspecified hyperlipidemia type     3. Tobacco use       1. PAD, s/p left iliac stent. Claudication better on left, still present on  right. Only treatment for right is fem-pop bypass. He does not want at this time. Advised exercise  and smoking cessation.  2. Diagonal stenosis, 3/13. Treated medically, no angina or CHF. On asa and plavix.   3. Hyperlipidemia. Continue atorvastatin 20 mg at night. Has labs with Dr. Oliveira.   4. Tobacco use. I advised him to stop smoking. He plans to try.   5. Varicose veins in LLE, advised compression stockings.  6. PACs.      Plan:       See jae in 1 year, increase imdur to 30mg BID for angina.

## 2020-05-13 RX ORDER — ISOSORBIDE MONONITRATE 30 MG/1
TABLET, EXTENDED RELEASE ORAL
Qty: 90 TABLET | Refills: 0 | Status: SHIPPED | OUTPATIENT
Start: 2020-05-13 | End: 2021-03-15

## 2020-06-05 RX ORDER — CLOPIDOGREL BISULFATE 75 MG/1
TABLET ORAL
Qty: 90 TABLET | Refills: 2 | Status: SHIPPED | OUTPATIENT
Start: 2020-06-05 | End: 2021-03-17

## 2021-03-08 ENCOUNTER — OFFICE VISIT (OUTPATIENT)
Dept: CARDIOLOGY | Facility: CLINIC | Age: 71
End: 2021-03-08

## 2021-03-08 VITALS
HEART RATE: 75 BPM | WEIGHT: 219 LBS | HEIGHT: 72 IN | BODY MASS INDEX: 29.66 KG/M2 | SYSTOLIC BLOOD PRESSURE: 138 MMHG | OXYGEN SATURATION: 96 % | RESPIRATION RATE: 16 BRPM | DIASTOLIC BLOOD PRESSURE: 90 MMHG

## 2021-03-08 DIAGNOSIS — I49.3 BEAT, PREMATURE VENTRICULAR: ICD-10-CM

## 2021-03-08 DIAGNOSIS — I25.10 MILD CORONARY ARTERY DISEASE: Primary | ICD-10-CM

## 2021-03-08 DIAGNOSIS — I70.90 ARTERIAL VASCULAR DISEASE: ICD-10-CM

## 2021-03-08 DIAGNOSIS — E78.5 HYPERLIPIDEMIA, UNSPECIFIED HYPERLIPIDEMIA TYPE: ICD-10-CM

## 2021-03-08 DIAGNOSIS — I73.9 ANGIOPATHY, PERIPHERAL (HCC): ICD-10-CM

## 2021-03-08 PROCEDURE — 93000 ELECTROCARDIOGRAM COMPLETE: CPT | Performed by: NURSE PRACTITIONER

## 2021-03-08 PROCEDURE — 99214 OFFICE O/P EST MOD 30 MIN: CPT | Performed by: NURSE PRACTITIONER

## 2021-03-08 RX ORDER — MELATONIN
5000 DAILY
COMMUNITY

## 2021-03-08 NOTE — PROGRESS NOTES
Date of Office Visit: 2021  Encounter Provider: NIURKA Holt  Place of Service: [facility]  Patient Name: Derik Borja  :1950  Primary Cardiologist: Dr. Kam    Ml:  Annual cardiac evaluation     Dear Dr. Oliveira    HPI: Derik Broja is a pleasant 70 y.o. male who presents 2021 for cardiac follow up.  I have reviewed past medical records including notes labs and testing in preparation for today's visit.    When he was initially seen, he was having progressive difficulty with his legs when he is walking. He also had to stop because of severe burning and aching in his legs, as well as significant dyspnea with exertion which seems to be  progressive and definitely gets better with rest. He was found to have COPD on a chest x-ray.      With his complaints, he had a 2-D echocardiogram with Doppler done at [facility] on 2013. This showed ejection fraction of 55-60%, aortic valve calcification, and trace mitral insufficiency. He had a Lexiscan and stress nuclear perfusion study which showed a small amount of inferior ischemia. Because of his symptoms of claudication and his positive testing showing moderate occlusive Disease.  He saw Dr. Brambila in consultation. He saw him on 2013, and set him for an angiogram which was done on 2013. He did the coronary angiogram which showed 30% diffuse left anterior descending stenosis, focal 70% large first diagonal stenosis, 30% mid circumflex stenosis, and 30% right coronary artery stenosis. The left main coronary artery was short. The right coronary artery was dominant. His abdominal aorta was smooth and no significant stenosis. The left renal artery was single with a 40-50% proximal stenosis. Did not see a comment here about the right renal artery. The common iliac arteries had significant disease on the left. The left had a focal 90% stenosis. The right common iliac had a 30% stenosis.  "The right internal iliac artery was patent. The left internal iliac artery was occluded. The superficial femoral artery was occluded on the left along its entire length and reconstituted with collaterals at the profunda. The common femoral artery had moderate, diffuse disease. There was brisk, three-vessel runoff to the foot. The right common femoral artery had mild disease. The right superficial femoral artery was occluded for its entire length and reconstituted with brisk profunda collaterals, and there was three-vessel runoff to the foot. He then underwent stenting of the left common iliac artery receiving an 8.0 mm x 39 mm Omnilink Elite stent. His SFA occlusions have been treated medically.       He did have a lymph node removed in his neck in November 2012.  He has been smoking a pack a day for over 20 years.      He saw Dr. Brambila on 07/15/2013. He has residual claudication in the right leg. Dr. Brambila said that that can only fixed with a femoral popliteal bypass surgery. The patient is not interested in that at this time. He does continue to smoke.        Stress nuclear perfusion study done 4/8/2019 shows no evidence of ischemia.     He states he is doing well.  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  He states sometimes if he is stuck in a position for very long he may feel some tightness in his chest.  He states the biggest concern/complaint is arthritis in his hips.  He states the claudication is not a concern at this time.  He will occasionally feel palpitations but he states \"I have pretty much gotten so used to the monofilament he more\".  He denies any shortness of breath.  He denies any fatigue.  He states he still does have some dizziness with change in position.  He will have some lower extremity edema by the end of the day.  He wears tight socks and his legs will be swollen over the top of the socks when he takes them off the end of the day.  I have suggested he get some socks " that do not have such tight elastic.  He denies any snoring, PND, cough, orthopnea.  He is taking his medications as directed.  He denies any unexplained bleeding, dark or tarry stools.  He states he had labs last week with you, we will request those results.  He currently smokes between a half a pack and 1/2 pack a day.       Past Medical History:   Diagnosis Date   • Angina pectoris (CMS/[facility])    • CAD (coronary artery disease)     3/13   • DOMINGUEZ (dyspnea on exertion)    • Hyperlipidemia    • Leg pain    • PAD (peripheral artery disease) (CMS/[facility])    • PVC (premature ventricular contraction)    • PVD (peripheral vascular disease) (CMS/[facility])        Past Surgical History:   Procedure Laterality Date   • ILIAC ARTERY STENT  03/2013   • SHOULDER SURGERY  2013       Social History     Socioeconomic History   • Marital status:      Spouse name: Not on file   • Number of children: Not on file   • Years of education: Not on file   • Highest education level: Not on file   Tobacco Use   • Smoking status: Current Every Day Smoker     Packs/day: 0.50     Years: 35.00     Pack years: 17.50     Types: Cigarettes   • Smokeless tobacco: Never Used   • Tobacco comment: caffeine use - 3 cups coffee daily   Substance and Sexual Activity   • Alcohol use: Yes   • Drug use: Yes     Types: Marijuana       Family History   Problem Relation Age of Onset   • Stroke Mother    • No Known Problems Father    • Heart disease Brother    • Kidney cancer Brother        The following portion of the patient's history were reviewed and updated as appropriate: past medical history, past surgical history, past social history, past family history, allergies, current medications, and problem list.    Review of Systems   Constitutional: Negative for diaphoresis, fever and malaise/fatigue.   HENT: Negative for congestion, hearing loss, hoarse voice, nosebleeds and sore throat.    Eyes: Negative for photophobia, vision loss in left eye, vision loss in  right eye and visual disturbance.   Cardiovascular: Positive for chest pain (chest presure, intermittent, better with imdur) and leg swelling (by the end of day). Negative for dyspnea on exertion, irregular heartbeat, near-syncope, orthopnea, palpitations, paroxysmal nocturnal dyspnea and syncope.   Respiratory: Negative for cough, hemoptysis, shortness of breath, sleep disturbances due to breathing, snoring, sputum production and wheezing.    Endocrine: Negative for cold intolerance, heat intolerance, polydipsia, polyphagia and polyuria.   Hematologic/Lymphatic: Negative for bleeding problem. Does not bruise/bleed easily.   Skin: Negative for color change, dry skin, poor wound healing, rash and suspicious lesions.   Musculoskeletal: Positive for arthritis and joint pain (hips). Negative for back pain, falls, gout, joint swelling, muscle cramps, muscle weakness and myalgias.   Gastrointestinal: Negative for bloating, abdominal pain, constipation, diarrhea, dysphagia, melena, nausea and vomiting.   Neurological: Positive for dizziness (intermittent with changes in position). Negative for excessive daytime sleepiness, headaches, light-headedness, loss of balance, numbness, paresthesias, seizures, vertigo and weakness.   Psychiatric/Behavioral: Negative for depression, memory loss and substance abuse. The patient is not nervous/anxious.        Allergies   Allergen Reactions   • Penicillins Swelling     Throat swelled  Throat swelled  Throat swelled   • Iodine Itching         Current Outpatient Medications:   •  acetaminophen (TYLENOL) 325 MG tablet, Take 2 tablets by mouth Every 4 (Four) Hours As Needed for Mild Pain ., Disp: , Rfl:   •  Ascorbic Acid (VITAMIN C PO), Take 1,000 mg by mouth Daily., Disp: , Rfl:   •  aspirin 325 MG tablet, Take 325 mg by mouth Daily., Disp: , Rfl:   •  atorvastatin (LIPITOR) 40 MG tablet, Take 40 mg by mouth Every Night., Disp: , Rfl:   •  cholecalciferol (VITAMIN D3) 25 MCG (1000 UT)  "tablet, Take 5,000 Units by mouth Daily., Disp: , Rfl:   •  clopidogrel (PLAVIX) 75 MG tablet, Take 1 tablet by mouth once daily, Disp: 90 tablet, Rfl: 2  •  cyclobenzaprine (FLEXERIL) 10 MG tablet, Take 10 mg by mouth Every Night., Disp: , Rfl:   •  Docusate Sodium (COLACE PO), Take 1 tablet by mouth Every Night., Disp: , Rfl:   •  fluticasone (FLONASE) 50 MCG/ACT nasal spray, 2 sprays into the nostril(s) as directed by provider Daily., Disp: , Rfl:   •  ipratropium-albuterol (DUO-NEB) 0.5-2.5 mg/3 ml nebulizer, Take 3 mL by nebulization 4 (Four) Times a Day., Disp: 120 mL, Rfl: 5  •  isosorbide mononitrate (IMDUR) 30 MG 24 hr tablet, Take 1 tablet by mouth once daily (Patient taking differently: 60 mg Daily.), Disp: 90 tablet, Rfl: 0  •  meloxicam (MOBIC) 15 MG tablet, Take 15 mg by mouth Daily., Disp: , Rfl:   •  Multiple Vitamin (MULTIVITAMIN) tablet, Take 1 tablet by mouth daily., Disp: , Rfl:   •  traMADol (ULTRAM) 50 MG tablet, Take 1 tablet by mouth Every 8 (Eight) Hours As Needed., Disp: , Rfl:         Objective:     Vitals:    03/08/21 0909   BP: 138/90   Pulse: 75   Resp: 16   SpO2: 96%   Weight: 99.3 kg (219 lb)   Height: 182.9 cm (72\")     Body mass index is 29.7 kg/m².      Vitals reviewed.   Constitutional:       General: Not in acute distress.     Appearance: Normal and healthy appearance. Well-developed, normal weight and not in distress.   Eyes:      General:         Right eye: No discharge.         Left eye: No discharge.      Conjunctiva/sclera: Conjunctivae normal.   HENT:      Head: Normocephalic and atraumatic.      Right Ear: External ear normal.      Left Ear: External ear normal.      Nose: Nose normal.   Neck:      Thyroid: No thyromegaly.      Vascular: No JVD.      Trachea: No tracheal deviation.      Lymphadenopathy: No cervical adenopathy.   Pulmonary:      Effort: Pulmonary effort is normal. No respiratory distress.      Breath sounds: Normal breath sounds. No wheezing. No rales. "   Chest:      Chest wall: Not tender to palpatation.   Cardiovascular:      Normal rate. Frequent ectopic beats. Regular rhythm.      No gallop.   Pulses:     Intact distal pulses.   Edema:     Peripheral edema present.     Ankle: trace pitting edema of the left ankle.  Abdominal:      General: There is no distension.      Palpations: Abdomen is soft.      Tenderness: There is no abdominal tenderness.   Musculoskeletal: Normal range of motion.         General: No tenderness or deformity.      Cervical back: Normal range of motion and neck supple. Skin:     General: Skin is warm and dry.      Findings: No erythema or rash.   Neurological:      Mental Status: Alert and oriented to person, place, and time.      Coordination: Coordination normal.   Psychiatric:         Attention and Perception: Attention normal.         Mood and Affect: Mood normal.         Speech: Speech normal.         Behavior: Behavior normal. Behavior is cooperative.         Thought Content: Thought content normal.         Cognition and Memory: Cognition normal.         Judgment: Judgment normal.               ECG 12 Lead    Date/Time: 3/8/2021 9:17 AM  Performed by: May Grant APRN  Authorized by: May Grant APRN   Comparison: compared with previous ECG from 3/9/2020  Similar to previous ECG  Rhythm: sinus rhythm  Ectopy: atrial premature contractions  Rate: normal  Conduction: conduction normal  ST Segments: ST segments normal  T Waves: T waves normal  QRS axis: normal    Clinical impression: non-specific ECG              Assessment:       Diagnosis Plan   1. Mild coronary artery disease     2. Beat, premature ventricular  Holter Monitor - 48 Hour   3. Hyperlipidemia, unspecified hyperlipidemia type     4. Arterial vascular disease     5. Angiopathy, peripheral (CMS/[facility])            Plan:       1. PAD, s/p left iliac stent.  He is denying any complaints of claudication pain at this time.  It is noted that the only treatment for right is  fem-pop bypass. He does not want at this time. Advised exercise and smoking cessation.  2. Diagonal stenosis, 3/13. Treated medically,  -occasional chest pressure.  Fleeting.  He is on full dose aspirin and Plavix  3. Hyperlipidemia -continue lipid-lowering therapy.  Currently on atorvastatin 40.  Recent labs with PCP, I have requested those.  4. Tobacco use.-Continues to smoke a half a pack to a pack a day.  Cessation advised.   5. Varicose veins in LLE, advised compression stockings.  6. PACs -frequent to auscultation.  Going to check a 48-hour Holter monitor.     ADDENDUM 3/17/21 - labs received from PCP with total cholesterol 140, HDL 38, triglycerides 87, LDL 84.  Unremarkable CMP.  Hemoglobin A1c is 5.5.  CBC unremarkable except for a white blood count of 10.9.  Normal PSA.    Check Holter  RTO in 1 year with RM    As always, it has been a pleasure to participate in your patient's care. Thank you.       Sincerely,       NIURKA Holt      Current Outpatient Medications:   •  acetaminophen (TYLENOL) 325 MG tablet, Take 2 tablets by mouth Every 4 (Four) Hours As Needed for Mild Pain ., Disp: , Rfl:   •  Ascorbic Acid (VITAMIN C PO), Take 1,000 mg by mouth Daily., Disp: , Rfl:   •  aspirin 325 MG tablet, Take 325 mg by mouth Daily., Disp: , Rfl:   •  atorvastatin (LIPITOR) 40 MG tablet, Take 40 mg by mouth Every Night., Disp: , Rfl:   •  cholecalciferol (VITAMIN D3) 25 MCG (1000 UT) tablet, Take 5,000 Units by mouth Daily., Disp: , Rfl:   •  clopidogrel (PLAVIX) 75 MG tablet, Take 1 tablet by mouth once daily, Disp: 90 tablet, Rfl: 2  •  cyclobenzaprine (FLEXERIL) 10 MG tablet, Take 10 mg by mouth Every Night., Disp: , Rfl:   •  Docusate Sodium (COLACE PO), Take 1 tablet by mouth Every Night., Disp: , Rfl:   •  fluticasone (FLONASE) 50 MCG/ACT nasal spray, 2 sprays into the nostril(s) as directed by provider Daily., Disp: , Rfl:   •  ipratropium-albuterol (DUO-NEB) 0.5-2.5 mg/3 ml nebulizer, Take 3 mL by  nebulization 4 (Four) Times a Day., Disp: 120 mL, Rfl: 5  •  isosorbide mononitrate (IMDUR) 30 MG 24 hr tablet, Take 1 tablet by mouth once daily (Patient taking differently: 60 mg Daily.), Disp: 90 tablet, Rfl: 0  •  meloxicam (MOBIC) 15 MG tablet, Take 15 mg by mouth Daily., Disp: , Rfl:   •  Multiple Vitamin (MULTIVITAMIN) tablet, Take 1 tablet by mouth daily., Disp: , Rfl:   •  traMADol (ULTRAM) 50 MG tablet, Take 1 tablet by mouth Every 8 (Eight) Hours As Needed., Disp: , Rfl:     Dictated utilizing Dragon dictation

## 2021-03-11 ENCOUNTER — HOSPITAL ENCOUNTER (OUTPATIENT)
Dept: CARDIOLOGY | Facility: HOSPITAL | Age: 71
Discharge: HOME | End: 2021-03-11
Admitting: NURSE PRACTITIONER

## 2021-03-11 DIAGNOSIS — I49.3 BEAT, PREMATURE VENTRICULAR: ICD-10-CM

## 2021-03-11 PROCEDURE — 93226 XTRNL ECG REC<48 HR SCAN A/R: CPT

## 2021-03-11 PROCEDURE — 93225 XTRNL ECG REC<48 HRS REC: CPT

## 2021-03-15 RX ORDER — ISOSORBIDE MONONITRATE 30 MG/1
60 TABLET, EXTENDED RELEASE ORAL DAILY
Qty: 180 TABLET | Refills: 3 | Status: SHIPPED | OUTPATIENT
Start: 2021-03-15 | End: 2022-02-22

## 2021-03-16 PROCEDURE — 93227 XTRNL ECG REC<48 HR R&I: CPT | Performed by: INTERNAL MEDICINE

## 2021-03-17 RX ORDER — CLOPIDOGREL BISULFATE 75 MG/1
TABLET ORAL
Qty: 90 TABLET | Refills: 3 | Status: SHIPPED | OUTPATIENT
Start: 2021-03-17 | End: 2022-02-22

## 2021-03-19 RX ORDER — METOPROLOL SUCCINATE 25 MG/1
25 TABLET, EXTENDED RELEASE ORAL DAILY
Qty: 90 TABLET | Refills: 3 | Status: SHIPPED | OUTPATIENT
Start: 2021-03-19 | End: 2022-02-22

## 2021-03-19 NOTE — PROGRESS NOTES
Discussed results with Dr. Kam.  We will start low-dose metoprolol succinate 25 mg at at bedtime.  I called and discussed with the patient and he is agreeable to plan.  I have asked him to call me in 2 to 3 weeks to let me know how he is feeling.  He voiced understanding.

## 2021-11-20 ENCOUNTER — TRANSCRIBE ORDERS (OUTPATIENT)
Dept: ADMINISTRATIVE | Facility: HOSPITAL | Age: 71
End: 2021-11-20

## 2021-11-20 ENCOUNTER — HOSPITAL ENCOUNTER (OUTPATIENT)
Dept: GENERAL RADIOLOGY | Facility: HOSPITAL | Age: 71
Discharge: HOME | End: 2021-11-20
Admitting: INTERNAL MEDICINE

## 2021-11-20 DIAGNOSIS — L97.322: Primary | ICD-10-CM

## 2021-11-20 DIAGNOSIS — L97.322: ICD-10-CM

## 2021-11-20 PROCEDURE — 73600 X-RAY EXAM OF ANKLE: CPT

## 2021-11-23 ENCOUNTER — LAB REQUISITION (OUTPATIENT)
Dept: LAB | Facility: HOSPITAL | Age: 71
End: 2021-11-23

## 2021-11-23 ENCOUNTER — APPOINTMENT (OUTPATIENT)
Dept: WOUND CARE | Facility: HOSPITAL | Age: 71
End: 2021-11-23

## 2021-11-23 ENCOUNTER — TRANSCRIBE ORDERS (OUTPATIENT)
Dept: ADMINISTRATIVE | Facility: HOSPITAL | Age: 71
End: 2021-11-23

## 2021-11-23 ENCOUNTER — LAB (OUTPATIENT)
Dept: LAB | Facility: HOSPITAL | Age: 71
End: 2021-11-23

## 2021-11-23 DIAGNOSIS — I70.242 ATHEROSCLEROSIS OF NATIVE ARTERY OF LEFT LEG WITH ULCERATION OF CALF (HCC): Primary | ICD-10-CM

## 2021-11-23 DIAGNOSIS — I70.242 ATHEROSCLEROSIS OF NATIVE ARTERY OF LEFT LOWER EXTREMITY WITH ULCERATION OF CALF (HCC): ICD-10-CM

## 2021-11-23 DIAGNOSIS — I70.242 ATHEROSCLEROSIS OF NATIVE ARTERY OF LEFT LEG WITH ULCERATION OF CALF (HCC): ICD-10-CM

## 2021-11-23 DIAGNOSIS — Z13.1 ENCOUNTER FOR SCREENING FOR DIABETES MELLITUS: ICD-10-CM

## 2021-11-23 DIAGNOSIS — I70.242 ATHEROSCLEROSIS OF NATIVE ARTERIES OF LEFT LEG WITH ULCERATION OF CALF (HCC): ICD-10-CM

## 2021-11-23 LAB
ALBUMIN SERPL-MCNC: 4.2 G/DL (ref 3.5–5.2)
ALBUMIN/GLOB SERPL: 1.4 G/DL
ALP SERPL-CCNC: 69 U/L (ref 39–117)
ALT SERPL W P-5'-P-CCNC: 28 U/L (ref 1–41)
ANION GAP SERPL CALCULATED.3IONS-SCNC: 11.6 MMOL/L (ref 5–15)
AST SERPL-CCNC: 36 U/L (ref 1–40)
BASOPHILS # BLD AUTO: 0.09 10*3/MM3 (ref 0–0.2)
BASOPHILS NFR BLD AUTO: 0.7 % (ref 0–1.5)
BILIRUB SERPL-MCNC: 0.3 MG/DL (ref 0–1.2)
BUN SERPL-MCNC: 24 MG/DL (ref 8–23)
BUN/CREAT SERPL: 36.9 (ref 7–25)
CALCIUM SPEC-SCNC: 9.3 MG/DL (ref 8.6–10.5)
CHLORIDE SERPL-SCNC: 105 MMOL/L (ref 98–107)
CO2 SERPL-SCNC: 22.4 MMOL/L (ref 22–29)
CREAT SERPL-MCNC: 0.65 MG/DL (ref 0.76–1.27)
CRP SERPL-MCNC: <0.3 MG/DL (ref 0–0.5)
DEPRECATED RDW RBC AUTO: 41 FL (ref 37–54)
EOSINOPHIL # BLD AUTO: 0.39 10*3/MM3 (ref 0–0.4)
EOSINOPHIL NFR BLD AUTO: 2.9 % (ref 0.3–6.2)
ERYTHROCYTE [DISTWIDTH] IN BLOOD BY AUTOMATED COUNT: 12 % (ref 12.3–15.4)
ERYTHROCYTE [SEDIMENTATION RATE] IN BLOOD: 17 MM/HR (ref 0–20)
GFR SERPL CREATININE-BSD FRML MDRD: 121 ML/MIN/1.73
GLOBULIN UR ELPH-MCNC: 3 GM/DL
GLUCOSE SERPL-MCNC: 100 MG/DL (ref 65–99)
HBA1C MFR BLD: 5.77 % (ref 4.8–5.6)
HCT VFR BLD AUTO: 43.6 % (ref 37.5–51)
HGB BLD-MCNC: 15.1 G/DL (ref 13–17.7)
IMM GRANULOCYTES # BLD AUTO: 0.07 10*3/MM3 (ref 0–0.05)
IMM GRANULOCYTES NFR BLD AUTO: 0.5 % (ref 0–0.5)
LYMPHOCYTES # BLD AUTO: 3.22 10*3/MM3 (ref 0.7–3.1)
LYMPHOCYTES NFR BLD AUTO: 23.8 % (ref 19.6–45.3)
MCH RBC QN AUTO: 32.3 PG (ref 26.6–33)
MCHC RBC AUTO-ENTMCNC: 34.6 G/DL (ref 31.5–35.7)
MCV RBC AUTO: 93.4 FL (ref 79–97)
MONOCYTES # BLD AUTO: 1.17 10*3/MM3 (ref 0.1–0.9)
MONOCYTES NFR BLD AUTO: 8.6 % (ref 5–12)
NEUTROPHILS NFR BLD AUTO: 63.5 % (ref 42.7–76)
NEUTROPHILS NFR BLD AUTO: 8.59 10*3/MM3 (ref 1.7–7)
NRBC BLD AUTO-RTO: 0 /100 WBC (ref 0–0.2)
PLATELET # BLD AUTO: 184 10*3/MM3 (ref 140–450)
PMV BLD AUTO: 9.6 FL (ref 6–12)
POTASSIUM SERPL-SCNC: 4.2 MMOL/L (ref 3.5–5.2)
PREALB SERPL-MCNC: 22.7 MG/DL (ref 20–40)
PROT SERPL-MCNC: 7.2 G/DL (ref 6–8.5)
RBC # BLD AUTO: 4.67 10*6/MM3 (ref 4.14–5.8)
SODIUM SERPL-SCNC: 139 MMOL/L (ref 136–145)
WBC NRBC COR # BLD: 13.53 10*3/MM3 (ref 3.4–10.8)

## 2021-11-23 PROCEDURE — 36415 COLL VENOUS BLD VENIPUNCTURE: CPT

## 2021-11-23 PROCEDURE — 86140 C-REACTIVE PROTEIN: CPT

## 2021-11-23 PROCEDURE — 83036 HEMOGLOBIN GLYCOSYLATED A1C: CPT

## 2021-11-23 PROCEDURE — 87205 SMEAR GRAM STAIN: CPT | Performed by: NURSE PRACTITIONER

## 2021-11-23 PROCEDURE — 87070 CULTURE OTHR SPECIMN AEROBIC: CPT | Performed by: NURSE PRACTITIONER

## 2021-11-23 PROCEDURE — 85025 COMPLETE CBC W/AUTO DIFF WBC: CPT

## 2021-11-23 PROCEDURE — 84134 ASSAY OF PREALBUMIN: CPT

## 2021-11-23 PROCEDURE — G0463 HOSPITAL OUTPT CLINIC VISIT: HCPCS

## 2021-11-23 PROCEDURE — 80053 COMPREHEN METABOLIC PANEL: CPT

## 2021-11-23 PROCEDURE — 85652 RBC SED RATE AUTOMATED: CPT

## 2021-11-26 LAB
BACTERIA SPEC AEROBE CULT: NORMAL
GRAM STN SPEC: NORMAL
GRAM STN SPEC: NORMAL

## 2021-11-30 ENCOUNTER — APPOINTMENT (OUTPATIENT)
Dept: WOUND CARE | Facility: HOSPITAL | Age: 71
End: 2021-11-30

## 2021-11-30 PROCEDURE — G0463 HOSPITAL OUTPT CLINIC VISIT: HCPCS

## 2021-12-02 ENCOUNTER — HOSPITAL ENCOUNTER (OUTPATIENT)
Dept: ULTRASOUND IMAGING | Facility: HOSPITAL | Age: 71
Discharge: HOME | End: 2021-12-02
Admitting: NURSE PRACTITIONER

## 2021-12-02 DIAGNOSIS — I70.242 ATHEROSCLEROSIS OF NATIVE ARTERY OF LEFT LEG WITH ULCERATION OF CALF (HCC): ICD-10-CM

## 2021-12-02 PROCEDURE — 93923 UPR/LXTR ART STDY 3+ LVLS: CPT

## 2021-12-07 ENCOUNTER — APPOINTMENT (OUTPATIENT)
Dept: WOUND CARE | Facility: HOSPITAL | Age: 71
End: 2021-12-07

## 2021-12-07 PROCEDURE — G0463 HOSPITAL OUTPT CLINIC VISIT: HCPCS

## 2021-12-14 ENCOUNTER — APPOINTMENT (OUTPATIENT)
Dept: WOUND CARE | Facility: HOSPITAL | Age: 71
End: 2021-12-14

## 2021-12-21 ENCOUNTER — APPOINTMENT (OUTPATIENT)
Dept: WOUND CARE | Facility: HOSPITAL | Age: 71
End: 2021-12-21

## 2022-01-04 ENCOUNTER — APPOINTMENT (OUTPATIENT)
Dept: WOUND CARE | Facility: HOSPITAL | Age: 72
End: 2022-01-04

## 2022-01-14 ENCOUNTER — APPOINTMENT (OUTPATIENT)
Dept: WOUND CARE | Facility: HOSPITAL | Age: 72
End: 2022-01-14

## 2022-01-28 ENCOUNTER — APPOINTMENT (OUTPATIENT)
Dept: WOUND CARE | Facility: HOSPITAL | Age: 72
End: 2022-01-28

## 2022-02-04 ENCOUNTER — APPOINTMENT (OUTPATIENT)
Dept: WOUND CARE | Facility: HOSPITAL | Age: 72
End: 2022-02-04

## 2022-02-15 ENCOUNTER — APPOINTMENT (OUTPATIENT)
Dept: WOUND CARE | Facility: HOSPITAL | Age: 72
End: 2022-02-15

## 2022-02-22 RX ORDER — ISOSORBIDE MONONITRATE 30 MG/1
TABLET, EXTENDED RELEASE ORAL
Qty: 180 TABLET | Refills: 0 | Status: SHIPPED | OUTPATIENT
Start: 2022-02-22 | End: 2022-05-19

## 2022-02-22 RX ORDER — METOPROLOL SUCCINATE 25 MG/1
TABLET, EXTENDED RELEASE ORAL
Qty: 90 TABLET | Refills: 0 | Status: SHIPPED | OUTPATIENT
Start: 2022-02-22 | End: 2022-05-19

## 2022-02-22 RX ORDER — CLOPIDOGREL BISULFATE 75 MG/1
TABLET ORAL
Qty: 90 TABLET | Refills: 0 | Status: SHIPPED | OUTPATIENT
Start: 2022-02-22 | End: 2022-05-19

## 2022-02-22 NOTE — TELEPHONE ENCOUNTER
Rx Refill Note  Requested Prescriptions     Pending Prescriptions Disp Refills   • clopidogrel (PLAVIX) 75 MG tablet [Pharmacy Med Name: Clopidogrel Bisulfate 75 MG Oral Tablet] 90 tablet 0     Sig: Take 1 tablet by mouth once daily   • metoprolol succinate XL (TOPROL-XL) 25 MG 24 hr tablet [Pharmacy Med Name: Metoprolol Succinate ER 25 MG Oral Tablet Extended Release 24 Hour] 90 tablet 0     Sig: Take 1 tablet by mouth once daily      Last office visit with prescribing clinician: 3/8/2021      Next office visit with prescribing clinician: V3/14/2022 GORDON Dukes MA  02/22/22, 12:29 EST

## 2022-02-25 ENCOUNTER — APPOINTMENT (OUTPATIENT)
Dept: WOUND CARE | Facility: HOSPITAL | Age: 72
End: 2022-02-25

## 2022-02-25 ENCOUNTER — LAB REQUISITION (OUTPATIENT)
Dept: LAB | Facility: HOSPITAL | Age: 72
End: 2022-02-25

## 2022-02-25 DIAGNOSIS — L97.222 NON-PRESSURE CHRONIC ULCER OF LEFT CALF WITH FAT LAYER EXPOSED: ICD-10-CM

## 2022-02-25 DIAGNOSIS — Z13.1 ENCOUNTER FOR SCREENING FOR DIABETES MELLITUS: ICD-10-CM

## 2022-02-25 DIAGNOSIS — L97.212 NON-PRESSURE CHRONIC ULCER OF RIGHT CALF WITH FAT LAYER EXPOSED: ICD-10-CM

## 2022-02-25 DIAGNOSIS — I83.012: ICD-10-CM

## 2022-02-25 DIAGNOSIS — I83.022: ICD-10-CM

## 2022-02-25 DIAGNOSIS — I70.232 ATHEROSCLEROSIS OF NATIVE ARTERIES OF RIGHT LEG WITH ULCERATION OF CALF: ICD-10-CM

## 2022-02-25 DIAGNOSIS — S80.812A ABRASION, LEFT LOWER LEG, INITIAL ENCOUNTER: ICD-10-CM

## 2022-02-25 DIAGNOSIS — Z72.0 TOBACCO USE: ICD-10-CM

## 2022-02-25 DIAGNOSIS — I70.242 ATHEROSCLEROSIS OF NATIVE ARTERIES OF LEFT LEG WITH ULCERATION OF CALF: ICD-10-CM

## 2022-02-25 PROCEDURE — 87077 CULTURE AEROBIC IDENTIFY: CPT | Performed by: NURSE PRACTITIONER

## 2022-02-25 PROCEDURE — 87205 SMEAR GRAM STAIN: CPT | Performed by: NURSE PRACTITIONER

## 2022-02-25 PROCEDURE — 87186 SC STD MICRODIL/AGAR DIL: CPT | Performed by: NURSE PRACTITIONER

## 2022-02-25 PROCEDURE — 97605 NEG PRS WND THER DME<=50SQCM: CPT

## 2022-02-25 PROCEDURE — 87070 CULTURE OTHR SPECIMN AEROBIC: CPT | Performed by: NURSE PRACTITIONER

## 2022-03-01 LAB
BACTERIA SPEC AEROBE CULT: ABNORMAL
BACTERIA SPEC AEROBE CULT: ABNORMAL
GRAM STN SPEC: ABNORMAL
GRAM STN SPEC: ABNORMAL

## 2022-03-04 ENCOUNTER — APPOINTMENT (OUTPATIENT)
Dept: WOUND CARE | Facility: HOSPITAL | Age: 72
End: 2022-03-04

## 2022-03-04 PROCEDURE — 97605 NEG PRS WND THER DME<=50SQCM: CPT

## 2022-03-11 ENCOUNTER — APPOINTMENT (OUTPATIENT)
Dept: WOUND CARE | Facility: HOSPITAL | Age: 72
End: 2022-03-11

## 2022-03-11 PROCEDURE — 97607 NEG PRS WND THR NDME<=50SQCM: CPT

## 2022-03-14 ENCOUNTER — OFFICE VISIT (OUTPATIENT)
Dept: CARDIOLOGY | Facility: CLINIC | Age: 72
End: 2022-03-14

## 2022-03-14 VITALS
BODY MASS INDEX: 28.09 KG/M2 | HEART RATE: 87 BPM | HEIGHT: 72 IN | DIASTOLIC BLOOD PRESSURE: 78 MMHG | OXYGEN SATURATION: 98 % | SYSTOLIC BLOOD PRESSURE: 124 MMHG | WEIGHT: 207.4 LBS

## 2022-03-14 DIAGNOSIS — R06.09 DYSPNEA ON EXERTION: ICD-10-CM

## 2022-03-14 DIAGNOSIS — I25.10 MILD CORONARY ARTERY DISEASE: Primary | ICD-10-CM

## 2022-03-14 DIAGNOSIS — Z72.0 TOBACCO USE: ICD-10-CM

## 2022-03-14 DIAGNOSIS — I73.9 ANGIOPATHY, PERIPHERAL: ICD-10-CM

## 2022-03-14 DIAGNOSIS — E78.5 HYPERLIPIDEMIA, UNSPECIFIED HYPERLIPIDEMIA TYPE: ICD-10-CM

## 2022-03-14 DIAGNOSIS — I49.3 BEAT, PREMATURE VENTRICULAR: ICD-10-CM

## 2022-03-14 PROCEDURE — 99214 OFFICE O/P EST MOD 30 MIN: CPT | Performed by: INTERNAL MEDICINE

## 2022-03-14 PROCEDURE — 93000 ELECTROCARDIOGRAM COMPLETE: CPT | Performed by: INTERNAL MEDICINE

## 2022-03-14 RX ORDER — ATORVASTATIN CALCIUM 80 MG/1
80 TABLET, FILM COATED ORAL NIGHTLY
Qty: 90 TABLET | Refills: 3 | Status: SHIPPED | COMMUNITY
Start: 2022-03-14 | End: 2023-03-15 | Stop reason: SDUPTHER

## 2022-03-14 NOTE — PROGRESS NOTES
Date of Office Visit: 2022  Encounter Provider: Ani Kam MD  Place of Service: [facility] CARDIOLOGY  Patient Name: Derik Borja  :1950      Patient ID:  Derik Borja is a 71 y.o. male is here for  followup for CAD, cardiac risk, PAD.        History of Present Illness    When we first met, he was having progressive difficulty with his legs  when he is walking. He also had to stop because of severe burning and aching in his  legs, as well as significant dyspnea with exertion which seems to be  progressive and definitely gets better with rest. He was found to have COPD on a chest x-ray.      With his complaints, he had a 2-D echocardiogram with Doppler  done at [facility] on 2013. This showed ejection fraction of  55-60%, aortic valve calcification, and trace mitral insufficiency. He had a Lexiscan and  stress nuclear perfusion study which showed a small amount of inferior ischemia. Because  of his symptoms of claudication and his positive testing showing moderate occlusive  Disease.  He saw Dr. Brambila in consultation. He saw him on 2013, and set  him for an angiogram which was done on 2013. He did the coronary angiogram which  showed 30% diffuse left anterior descending stenosis, focal 70% large first diagonal  stenosis, 30% mid circumflex stenosis, and 30% right coronary artery stenosis. The left  main coronary artery was short. The right coronary artery was dominant. His abdominal  aorta was smooth and no significant stenosis. The left renal artery was single with a  40-50% proximal stenosis. I do not see a comment here about the right renal artery. The  common iliac arteries had significant disease on the left. The left had a focal 90%  stenosis. The right common iliac had a 30% stenosis. The right internal iliac artery was  patent. The left internal iliac artery was occluded. The superficial femoral artery was  occluded on the left  along its entire length and reconstituted with collaterals at the  profunda. The common femoral artery had moderate, diffuse disease. There was brisk,  three-vessel runoff to the foot. The right common femoral artery had mild disease. The  right superficial femoral artery was occluded for its entire length and reconstituted with  brisk profunda collaterals, and there was three-vessel runoff to the foot. He then  underwent stenting of the left common iliac artery receiving an 8.0 mm x 39 mm Omnilink  Elite stent. His SFA occlusions have been treated medically.         He did have a lymph node removed in his neck in November 2012.   His wife Holley is a cousin to Merrick Mccloud, who are also my patients.   He has been smoking a pack a day for over 20 years. He and his wife are both in the  process of trying to quit.      He saw Dr. Brambila on 07/15/2013. He has residual claudication in the right leg. Dr. Brambila said that that can only fixed with a femoral popliteal bypass surgery. The patient is  not interested in that at this time.    Stress nuclear perfusion study done 4/8/2019 shows no evidence of ischemia.  He had a sleep evaluation done in 2019 showing mild obstructive sleep apnea for which he decided not to treat.     He has frequent PACs but really has no symptoms associated with this.    He smokes half pack of cigarettes a day.  He is seeing wound care for ulcers on his legs.  The wound on his left leg was acquired due to trauma on 6/23/2021.  He does have arterial insufficiency of the leg.  The wound on his right leg was also due to trauma on 10/1/2021 and also due to arterial insufficiency.  He had a Holter monitor done for 48 hours on 3/11/2021 which showed 7% PACs with 36 runs of nonsustained atrial tachycardia, longest lasting 43 beats.  Rare PVCs were noted and no VT, no pauses or bradycardia.    Labs on 2/8/2022 showed total cholesterol 139, HDL 30, triglycerides 98, LDL 82, non-, normal CMP,  white blood cell count 11.9, otherwise normal CBC.    He has no exertional chest tightness or pressure and still works full-time.  He does have exertional dyspnea and some wheezing and coughing but does continue to smoke.  He does not feels heart racing or skipping he has had no dizziness or syncope.  He does have significant claudication of bilateral legs with bilateral ulcers.  He has no fevers, chills or cough.  He has no orthopnea or PND.    Addendum: Labs on 2/8/2022 showed total cholesterol 139, HDL 38, triglycerides 98, LDL 82, non-, unremarkable CMP and CBC.    Past Medical History:   Diagnosis Date   • Angina pectoris ([facility])    • CAD (coronary artery disease)     3/13   • DOMINGUEZ (dyspnea on exertion)    • Hyperlipidemia    • Leg pain    • PAD (peripheral artery disease) ([facility])    • PVC (premature ventricular contraction)    • PVD (peripheral vascular disease) ([facility])          Past Surgical History:   Procedure Laterality Date   • ILIAC ARTERY STENT  03/2013   • SHOULDER SURGERY  2013       Current Outpatient Medications on File Prior to Visit   Medication Sig Dispense Refill   • acetaminophen (TYLENOL) 325 MG tablet Take 2 tablets by mouth Every 4 (Four) Hours As Needed for Mild Pain .     • Ascorbic Acid (VITAMIN C PO) Take 1,000 mg by mouth Daily.     • aspirin 325 MG tablet Take 325 mg by mouth Daily.     • atorvastatin (LIPITOR) 80 MG tablet Take 1 tablet by mouth Every Night. 90 tablet 3   • cholecalciferol (VITAMIN D3) 25 MCG (1000 UT) tablet Take 5,000 Units by mouth Daily.     • clopidogrel (PLAVIX) 75 MG tablet Take 1 tablet by mouth once daily 90 tablet 0   • cyclobenzaprine (FLEXERIL) 10 MG tablet Take 10 mg by mouth Every Night.     • Docusate Sodium (COLACE PO) Take 1 tablet by mouth Every Night.     • ipratropium-albuterol (DUO-NEB) 0.5-2.5 mg/3 ml nebulizer Take 3 mL by nebulization 4 (Four) Times a Day. 120 mL 5   • isosorbide mononitrate (IMDUR) 30 MG 24 hr tablet Take 2 tablets by mouth  "once daily 180 tablet 0   • meloxicam (MOBIC) 15 MG tablet Take 15 mg by mouth Daily.     • metoprolol succinate XL (TOPROL-XL) 25 MG 24 hr tablet Take 1 tablet by mouth once daily 90 tablet 0   • Multiple Vitamin (MULTIVITAMIN) tablet Take 1 tablet by mouth daily.     • traMADol (ULTRAM) 50 MG tablet Take 1 tablet by mouth Every 8 (Eight) Hours As Needed.     • [DISCONTINUED] atorvastatin (LIPITOR) 40 MG tablet Take 40 mg by mouth Every Night.     • fluticasone (FLONASE) 50 MCG/ACT nasal spray 2 sprays into the nostril(s) as directed by provider Daily.       No current facility-administered medications on file prior to visit.       Social History     Socioeconomic History   • Marital status:    Tobacco Use   • Smoking status: Current Every Day Smoker     Packs/day: 0.50     Years: 35.00     Pack years: 17.50     Types: Cigarettes   • Smokeless tobacco: Never Used   • Tobacco comment: caffeine use - 3 cups coffee daily   Substance and Sexual Activity   • Alcohol use: Yes   • Drug use: Yes     Types: Marijuana           ROS    Procedures    ECG 12 Lead    Date/Time: 3/14/2022 9:33 AM  Performed by: Ani Kam MD  Authorized by: Ani Kam MD   Comparison: compared with previous ECG   Similar to previous ECG  Rhythm: sinus rhythm    Clinical impression: normal ECG                Objective:      Vitals:    03/14/22 0917   BP: 124/78   BP Location: Right arm   Pulse: 87   SpO2: 98%   Weight: 94.1 kg (207 lb 6.4 oz)   Height: 182.9 cm (72\")     Body mass index is 28.13 kg/m².    Vitals reviewed.   Constitutional:       General: Not in acute distress.     Appearance: Well-developed. Not diaphoretic.   Eyes:      General: No scleral icterus.     Conjunctiva/sclera: Conjunctivae normal.   HENT:      Head: Normocephalic and atraumatic.   Neck:      Thyroid: No thyromegaly.      Vascular: No carotid bruit or JVD.      Lymphadenopathy: No cervical adenopathy.   Pulmonary:      Effort: Pulmonary " effort is normal. No respiratory distress.      Breath sounds: Examination of the right-lower field reveals wheezing. Decreased breath sounds present. Wheezing present. No rhonchi. No rales.   Chest:      Chest wall: Not tender to palpatation.   Cardiovascular:      Normal rate. Regular rhythm.      Murmurs: There is no murmur.      No gallop.      Comments: Bilateral LE wounds - wrapped  Pulses:     Decreased pulses.      Dorsalis pedis: 0 bilaterally.     Posterior tibial: 0 bilaterally.  Edema:     Peripheral edema absent.   Abdominal:      General: Bowel sounds are normal. There is no distension or abdominal bruit.      Palpations: Abdomen is soft. There is no abdominal mass.      Tenderness: There is no abdominal tenderness.   Musculoskeletal:         General: No deformity.      Extremities: No clubbing present.     Cervical back: Neck supple. Skin:     General: Skin is warm and dry. There is no cyanosis.      Coloration: Skin is not pale.      Findings: No rash.   Neurological:      Mental Status: Alert and oriented to person, place, and time.      Cranial Nerves: No cranial nerve deficit.   Psychiatric:         Judgment: Judgment normal.         Lab Review:       Assessment:      Diagnosis Plan   1. Mild coronary artery disease     2. Hyperlipidemia, unspecified hyperlipidemia type  Adult Transthoracic Echo Complete W/ Cont if Necessary Per Protocol   3. Beat, premature ventricular  Adult Transthoracic Echo Complete W/ Cont if Necessary Per Protocol   4. Angiopathy, peripheral (HCC)  Ambulatory Referral to Vascular Surgery   5. Tobacco use  Ambulatory Referral to Vascular Surgery   6. Dyspnea on exertion  Adult Transthoracic Echo Complete W/ Cont if Necessary Per Protocol     1. PAD, s/p left iliac stent.  Has arterial ulcers bilateral lower extremities.  Advised smoking cessation and exercise.  Refer to vascular surgery.  He has been seen at wound care.  2. Diagonal stenosis, 3/13. Treated medically, no  angina or CHF. On asa and plavix.   3. Hyperlipidemia.  On atorvastatin.  4. Tobacco use. I advised him to stop smoking.   5. Varicose veins in LLE, advised compression stockings.  6. PACs.      Plan:       Has exertional dyspnea, set up echocardiogram.  I am looking for cardiomyopathy.  He may though need a repeat stress study.  Increase atorvastatin to 80 mg nightly for PAD.  Strongly advised smoking cessation.  Blood pressure controlled, no medication adjustments made to his blood pressure medications.

## 2022-03-18 ENCOUNTER — APPOINTMENT (OUTPATIENT)
Dept: WOUND CARE | Facility: HOSPITAL | Age: 72
End: 2022-03-18

## 2022-03-18 PROCEDURE — 97605 NEG PRS WND THER DME<=50SQCM: CPT

## 2022-03-25 ENCOUNTER — APPOINTMENT (OUTPATIENT)
Dept: WOUND CARE | Facility: HOSPITAL | Age: 72
End: 2022-03-25

## 2022-04-01 ENCOUNTER — APPOINTMENT (OUTPATIENT)
Dept: WOUND CARE | Facility: HOSPITAL | Age: 72
End: 2022-04-01

## 2022-04-01 PROCEDURE — 97605 NEG PRS WND THER DME<=50SQCM: CPT

## 2022-04-08 ENCOUNTER — APPOINTMENT (OUTPATIENT)
Dept: WOUND CARE | Facility: HOSPITAL | Age: 72
End: 2022-04-08

## 2022-04-08 PROCEDURE — 97607 NEG PRS WND THR NDME<=50SQCM: CPT

## 2022-04-11 ENCOUNTER — HOSPITAL ENCOUNTER (OUTPATIENT)
Dept: CARDIOLOGY | Facility: HOSPITAL | Age: 72
Discharge: HOME | End: 2022-04-11
Admitting: INTERNAL MEDICINE

## 2022-04-11 ENCOUNTER — TELEPHONE (OUTPATIENT)
Dept: CARDIOLOGY | Facility: CLINIC | Age: 72
End: 2022-04-11

## 2022-04-11 VITALS
DIASTOLIC BLOOD PRESSURE: 70 MMHG | HEIGHT: 72 IN | WEIGHT: 208 LBS | SYSTOLIC BLOOD PRESSURE: 122 MMHG | BODY MASS INDEX: 28.17 KG/M2 | HEART RATE: 75 BPM

## 2022-04-11 DIAGNOSIS — R06.09 DYSPNEA ON EXERTION: ICD-10-CM

## 2022-04-11 DIAGNOSIS — I49.3 BEAT, PREMATURE VENTRICULAR: ICD-10-CM

## 2022-04-11 DIAGNOSIS — E78.5 HYPERLIPIDEMIA, UNSPECIFIED HYPERLIPIDEMIA TYPE: ICD-10-CM

## 2022-04-11 LAB
AORTIC ARCH: 2.4 CM
ASCENDING AORTA: 3.6 CM
BH CV ECHO MEAS - ACS: 1.72 CM
BH CV ECHO MEAS - AO MAX PG: 13.3 MMHG
BH CV ECHO MEAS - AO MEAN PG: 6.7 MMHG
BH CV ECHO MEAS - AO ROOT DIAM: 3.9 CM
BH CV ECHO MEAS - AO V2 MAX: 182.3 CM/SEC
BH CV ECHO MEAS - AO V2 VTI: 33.2 CM
BH CV ECHO MEAS - AVA(I,D): 6.4 CM2
BH CV ECHO MEAS - EDV(CUBED): 44.7 ML
BH CV ECHO MEAS - EDV(MOD-SP2): 51 ML
BH CV ECHO MEAS - EDV(MOD-SP4): 78 ML
BH CV ECHO MEAS - EF(MOD-BP): 63.8 %
BH CV ECHO MEAS - EF(MOD-SP2): 62.7 %
BH CV ECHO MEAS - EF(MOD-SP4): 64.1 %
BH CV ECHO MEAS - ESV(CUBED): 7.2 ML
BH CV ECHO MEAS - ESV(MOD-SP2): 19 ML
BH CV ECHO MEAS - ESV(MOD-SP4): 28 ML
BH CV ECHO MEAS - FS: 45.7 %
BH CV ECHO MEAS - IVS/LVPW: 1.02 CM
BH CV ECHO MEAS - IVSD: 1.05 CM
BH CV ECHO MEAS - LAT PEAK E' VEL: 9.2 CM/SEC
BH CV ECHO MEAS - LV DIASTOLIC VOL/BSA (35-75): 36 CM2
BH CV ECHO MEAS - LV MASS(C)D: 111.8 GRAMS
BH CV ECHO MEAS - LV MAX PG: 6.1 MMHG
BH CV ECHO MEAS - LV MEAN PG: 3.4 MMHG
BH CV ECHO MEAS - LV SYSTOLIC VOL/BSA (12-30): 12.9 CM2
BH CV ECHO MEAS - LV V1 MAX: 123.4 CM/SEC
BH CV ECHO MEAS - LV V1 VTI: 23.3 CM
BH CV ECHO MEAS - LVIDD: 3.5 CM
BH CV ECHO MEAS - LVIDS: 1.93 CM
BH CV ECHO MEAS - LVOT AREA: 9.2 CM2
BH CV ECHO MEAS - LVOT DIAM: 3.4 CM
BH CV ECHO MEAS - LVPWD: 1.03 CM
BH CV ECHO MEAS - MED PEAK E' VEL: 7.3 CM/SEC
BH CV ECHO MEAS - MV A DUR: 0.12 SEC
BH CV ECHO MEAS - MV A MAX VEL: 106.7 CM/SEC
BH CV ECHO MEAS - MV DEC SLOPE: 298.1 CM/SEC2
BH CV ECHO MEAS - MV DEC TIME: 0.28 MSEC
BH CV ECHO MEAS - MV E MAX VEL: 60 CM/SEC
BH CV ECHO MEAS - MV E/A: 0.56
BH CV ECHO MEAS - MV MAX PG: 6.1 MMHG
BH CV ECHO MEAS - MV MEAN PG: 2.2 MMHG
BH CV ECHO MEAS - MV V2 VTI: 26.6 CM
BH CV ECHO MEAS - MVA(VTI): 8 CM2
BH CV ECHO MEAS - PA ACC TIME: 0.13 SEC
BH CV ECHO MEAS - PA PR(ACCEL): 20.8 MMHG
BH CV ECHO MEAS - PA V2 MAX: 105.7 CM/SEC
BH CV ECHO MEAS - PULM A REVS DUR: 0.09 SEC
BH CV ECHO MEAS - PULM A REVS VEL: 39.8 CM/SEC
BH CV ECHO MEAS - PULM DIAS VEL: 33 CM/SEC
BH CV ECHO MEAS - PULM SYS VEL: 55.3 CM/SEC
BH CV ECHO MEAS - RV MAX PG: 2.17 MMHG
BH CV ECHO MEAS - RV V1 MAX: 73.7 CM/SEC
BH CV ECHO MEAS - RV V1 VTI: 14 CM
BH CV ECHO MEAS - RVOT DIAM: 2.7 CM
BH CV ECHO MEAS - SI(MOD-SP2): 14.8 ML/M2
BH CV ECHO MEAS - SI(MOD-SP4): 23.1 ML/M2
BH CV ECHO MEAS - SUP REN AO DIAM: 2.4 CM
BH CV ECHO MEAS - SV(LVOT): 213.7 ML
BH CV ECHO MEAS - SV(MOD-SP2): 32 ML
BH CV ECHO MEAS - SV(MOD-SP4): 50 ML
BH CV ECHO MEAS - SV(RVOT): 77.4 ML
BH CV ECHO MEAS - TAPSE (>1.6): 2.48 CM
BH CV ECHO MEASUREMENTS AVERAGE E/E' RATIO: 7.27
BH CV XLRA - RV LENGTH: 6.4 CM
BH CV XLRA - RV MID: 2.9 CM
BH CV XLRA - TDI S': 19.7 CM/SEC
LEFT ATRIUM VOLUME INDEX: 22.9 ML/M2
MAXIMAL PREDICTED HEART RATE: 149 BPM
SINUS: 3.9 CM
STJ: 3.3 CM
STRESS TARGET HR: 127 BPM

## 2022-04-11 PROCEDURE — 93306 TTE W/DOPPLER COMPLETE: CPT

## 2022-04-11 PROCEDURE — 93306 TTE W/DOPPLER COMPLETE: CPT | Performed by: INTERNAL MEDICINE

## 2022-04-15 ENCOUNTER — APPOINTMENT (OUTPATIENT)
Dept: WOUND CARE | Facility: HOSPITAL | Age: 72
End: 2022-04-15

## 2022-04-22 ENCOUNTER — APPOINTMENT (OUTPATIENT)
Dept: WOUND CARE | Facility: HOSPITAL | Age: 72
End: 2022-04-22

## 2022-04-29 ENCOUNTER — APPOINTMENT (OUTPATIENT)
Dept: WOUND CARE | Facility: HOSPITAL | Age: 72
End: 2022-04-29

## 2022-04-29 PROCEDURE — G0463 HOSPITAL OUTPT CLINIC VISIT: HCPCS

## 2022-05-19 RX ORDER — ISOSORBIDE MONONITRATE 30 MG/1
TABLET, EXTENDED RELEASE ORAL
Qty: 180 TABLET | Refills: 0 | Status: SHIPPED | OUTPATIENT
Start: 2022-05-19 | End: 2022-10-07

## 2022-05-19 RX ORDER — CLOPIDOGREL BISULFATE 75 MG/1
TABLET ORAL
Qty: 90 TABLET | Refills: 0 | Status: SHIPPED | OUTPATIENT
Start: 2022-05-19 | End: 2022-09-08

## 2022-05-19 RX ORDER — METOPROLOL SUCCINATE 25 MG/1
TABLET, EXTENDED RELEASE ORAL
Qty: 90 TABLET | Refills: 0 | Status: SHIPPED | OUTPATIENT
Start: 2022-05-19 | End: 2022-06-13

## 2022-06-13 RX ORDER — METOPROLOL SUCCINATE 25 MG/1
TABLET, EXTENDED RELEASE ORAL
Qty: 90 TABLET | Refills: 0 | Status: SHIPPED | OUTPATIENT
Start: 2022-06-13 | End: 2022-09-07

## 2022-09-06 ENCOUNTER — LAB REQUISITION (OUTPATIENT)
Dept: LAB | Facility: HOSPITAL | Age: 72
End: 2022-09-06

## 2022-09-06 DIAGNOSIS — Z00.00 ENCOUNTER FOR GENERAL ADULT MEDICAL EXAMINATION WITHOUT ABNORMAL FINDINGS: ICD-10-CM

## 2022-09-06 PROCEDURE — 87147 CULTURE TYPE IMMUNOLOGIC: CPT | Performed by: SURGERY

## 2022-09-06 PROCEDURE — 87205 SMEAR GRAM STAIN: CPT | Performed by: SURGERY

## 2022-09-06 PROCEDURE — 87077 CULTURE AEROBIC IDENTIFY: CPT | Performed by: SURGERY

## 2022-09-06 PROCEDURE — 87070 CULTURE OTHR SPECIMN AEROBIC: CPT | Performed by: SURGERY

## 2022-09-06 PROCEDURE — 87186 SC STD MICRODIL/AGAR DIL: CPT | Performed by: SURGERY

## 2022-09-07 RX ORDER — METOPROLOL SUCCINATE 25 MG/1
TABLET, EXTENDED RELEASE ORAL
Qty: 90 TABLET | Refills: 2 | Status: SHIPPED | OUTPATIENT
Start: 2022-09-07 | End: 2023-03-15 | Stop reason: SDUPTHER

## 2022-09-08 RX ORDER — CIPROFLOXACIN 500 MG/1
500 TABLET, FILM COATED ORAL 2 TIMES DAILY
Qty: 20 TABLET | Refills: 0 | Status: SHIPPED | OUTPATIENT
Start: 2022-09-08 | End: 2022-10-31 | Stop reason: SDUPTHER

## 2022-09-08 RX ORDER — CLOPIDOGREL BISULFATE 75 MG/1
TABLET ORAL
Qty: 90 TABLET | Refills: 1 | Status: SHIPPED | OUTPATIENT
Start: 2022-09-08 | End: 2023-03-06

## 2022-09-09 LAB
BACTERIA SPEC AEROBE CULT: ABNORMAL
GRAM STN SPEC: ABNORMAL

## 2022-10-07 RX ORDER — ISOSORBIDE MONONITRATE 30 MG/1
TABLET, EXTENDED RELEASE ORAL
Qty: 180 TABLET | Refills: 1 | Status: SHIPPED | OUTPATIENT
Start: 2022-10-07 | End: 2023-03-15 | Stop reason: SDUPTHER

## 2022-10-31 RX ORDER — CIPROFLOXACIN 500 MG/1
500 TABLET, FILM COATED ORAL 2 TIMES DAILY
Qty: 20 TABLET | Refills: 2 | Status: SHIPPED | OUTPATIENT
Start: 2022-10-31

## 2022-10-31 RX ORDER — DOXYCYCLINE HYCLATE 100 MG/1
100 CAPSULE ORAL 2 TIMES DAILY
Qty: 20 CAPSULE | Refills: 2 | Status: SHIPPED | OUTPATIENT
Start: 2022-10-31

## 2023-02-20 ENCOUNTER — TELEPHONE (OUTPATIENT)
Dept: CARDIOLOGY | Facility: CLINIC | Age: 73
End: 2023-02-20
Payer: MEDICARE

## 2023-02-20 DIAGNOSIS — Z72.0 TOBACCO USE: ICD-10-CM

## 2023-02-20 DIAGNOSIS — R06.09 DYSPNEA ON EXERTION: ICD-10-CM

## 2023-02-20 DIAGNOSIS — E78.5 HYPERLIPIDEMIA, UNSPECIFIED HYPERLIPIDEMIA TYPE: Primary | ICD-10-CM

## 2023-02-20 DIAGNOSIS — I25.10 MILD CORONARY ARTERY DISEASE: ICD-10-CM

## 2023-02-20 NOTE — TELEPHONE ENCOUNTER
Caller: Shantel Borja    Relationship: Emergency Contact    Best call back number: [phone]    What orders are you requesting LAB    In what timeframe would the patient need to come in: BEFORE 3/15/23    Where will you receive your lab/imaging services: [facility] PHONE NUMBER [phone] FAX [phone]

## 2023-02-20 NOTE — TELEPHONE ENCOUNTER
Returned call to Shantel for additional information.  Shantel stated that Dr. Kam has patient gets labs drawn prior to his annual visit, however she doesn't recall which labs are usually done.  Please place orders for labs so that patient may have them drawn at PCP's prior to appointment with Dr. Kam on 3/15.    Thank you,  Karissa GONZALES RN  Triage Nurse [facility] [place]

## 2023-02-21 NOTE — TELEPHONE ENCOUNTER
Called Dr. Oliveira's office to confirm fax number.  Spoke with Damon: [phone].    Faxed lab orders to provided number (success).    Returned call to Shantel and notified that lab orders have been faxed to Dr. Oliveira's office.  Informed that there is an order for a lipid panel and that patient need's to fasting for this lab.  Shantel verbalized understanding.    Thank you,  Karissa GONZALES RN  [place]riage Nurse [facility] [place]

## 2023-03-06 RX ORDER — CLOPIDOGREL BISULFATE 75 MG/1
TABLET ORAL
Qty: 90 TABLET | Refills: 0 | Status: SHIPPED | OUTPATIENT
Start: 2023-03-06

## 2023-03-15 ENCOUNTER — OFFICE VISIT (OUTPATIENT)
Dept: CARDIOLOGY | Facility: CLINIC | Age: 73
End: 2023-03-15
Payer: MEDICARE

## 2023-03-15 VITALS
HEART RATE: 89 BPM | SYSTOLIC BLOOD PRESSURE: 132 MMHG | DIASTOLIC BLOOD PRESSURE: 84 MMHG | BODY MASS INDEX: 26.82 KG/M2 | WEIGHT: 198 LBS | HEIGHT: 72 IN

## 2023-03-15 DIAGNOSIS — I25.10 MILD CORONARY ARTERY DISEASE: ICD-10-CM

## 2023-03-15 DIAGNOSIS — Z72.0 TOBACCO USE: ICD-10-CM

## 2023-03-15 DIAGNOSIS — E78.5 HYPERLIPIDEMIA, UNSPECIFIED HYPERLIPIDEMIA TYPE: ICD-10-CM

## 2023-03-15 DIAGNOSIS — I70.90 ARTERIAL VASCULAR DISEASE: Primary | ICD-10-CM

## 2023-03-15 PROCEDURE — 1159F MED LIST DOCD IN RCRD: CPT | Performed by: INTERNAL MEDICINE

## 2023-03-15 PROCEDURE — 1160F RVW MEDS BY RX/DR IN RCRD: CPT | Performed by: INTERNAL MEDICINE

## 2023-03-15 PROCEDURE — 93000 ELECTROCARDIOGRAM COMPLETE: CPT | Performed by: INTERNAL MEDICINE

## 2023-03-15 PROCEDURE — 99214 OFFICE O/P EST MOD 30 MIN: CPT | Performed by: INTERNAL MEDICINE

## 2023-03-15 RX ORDER — ATORVASTATIN CALCIUM 80 MG/1
80 TABLET, FILM COATED ORAL NIGHTLY
Qty: 90 TABLET | Refills: 3 | Status: SHIPPED | OUTPATIENT
Start: 2023-03-15

## 2023-03-15 RX ORDER — METOPROLOL SUCCINATE 50 MG/1
50 TABLET, EXTENDED RELEASE ORAL DAILY
Qty: 90 TABLET | Refills: 3 | Status: SHIPPED | OUTPATIENT
Start: 2023-03-15

## 2023-03-15 RX ORDER — NITROGLYCERIN 0.4 MG/1
TABLET SUBLINGUAL
Qty: 30 TABLET | Refills: 3 | Status: SHIPPED | OUTPATIENT
Start: 2023-03-15

## 2023-03-15 RX ORDER — ISOSORBIDE MONONITRATE 30 MG/1
60 TABLET, EXTENDED RELEASE ORAL 2 TIMES DAILY
Qty: 180 TABLET | Refills: 3 | Status: SHIPPED | OUTPATIENT
Start: 2023-03-15 | End: 2023-04-05

## 2023-03-15 NOTE — PROGRESS NOTES
Date of Office Visit: 03/15/2023  Encounter Provider: Ani Kam MD  Place of Service: [facility] CARDIOLOGY  Patient Name: Derik Borja  :1950      Patient ID:  Derik Borja is a 72 y.o. male is here for  followup for cardiac risks.         History of Present Illness    He has a history of hypertension, PAD with common iliac stent-history of ulcers lower extremities, hyperlipidemia, history of premature atrial complexes, tobacco use and COPD.     With his complaints, he had a 2-D echocardiogram with Doppler  done at [facility] on 2013. This showed ejection fraction of  55-60%, aortic valve calcification, and trace mitral insufficiency. He had a Lexiscan and  stress nuclear perfusion study which showed a small amount of inferior ischemia. Because  of his symptoms of claudication and his positive testing showing moderate occlusive  Disease.  He saw Dr. Brambila in consultation. He saw him on 2013, and set  him for an angiogram which was done on 2013. He did the coronary angiogram which  showed 30% diffuse left anterior descending stenosis, focal 70% large first diagonal  stenosis, 30% mid circumflex stenosis, and 30% right coronary artery stenosis. The left  main coronary artery was short. The right coronary artery was dominant. His abdominal  aorta was smooth and no significant stenosis. The left renal artery was single with a  40-50% proximal stenosis. I do not see a comment here about the right renal artery. The  common iliac arteries had significant disease on the left. The left had a focal 90%  stenosis. The right common iliac had a 30% stenosis. The right internal iliac artery was  patent. The left internal iliac artery was occluded. The superficial femoral artery was  occluded on the left along its entire length and reconstituted with collaterals at the  profunda. The common femoral artery had moderate, diffuse disease. There was  brisk,  three-vessel runoff to the foot. The right common femoral artery had mild disease. The  right superficial femoral artery was occluded for its entire length and reconstituted with  brisk profunda collaterals, and there was three-vessel runoff to the foot. He then  underwent stenting of the left common iliac artery receiving an 8.0 mm x 39 mm Omnilink  Elite stent. His SFA occlusions have been treated medically.         He did have a lymph node removed in his neck in November 2012.   His wife Holley is a cousin to the Rogers.  He has been smoking a pack a day for over 20 years. He and his wife are both in the  process of trying to quit.      He was told 2013 that his claudication can only be fixed with femoral-popliteal bypass.       Stress nuclear perfusion study done 4/8/2019 shows no evidence of ischemia.  He had a sleep evaluation done in 2019 showing mild obstructive sleep apnea for which he decided not to treat.     He had a wound on his left leg was acquired due to trauma on 6/23/2021.  He does have arterial insufficiency of the leg.  The wound on his right leg was also due to trauma on 10/1/2021 and also due to arterial insufficiency.  He had a Holter monitor done for 48 hours on 3/11/2021 which showed 7% PACs with 36 runs of nonsustained atrial tachycardia, longest lasting 43 beats.  Rare PVCs were noted and no VT, no pauses or bradycardia.     Labs on 2/8/2022 showed total cholesterol 139, HDL 30, triglycerides 98, LDL 82, non-, normal CMP, white blood cell count 11.9, otherwise normal CBC.  Echo done 4/11/22 showed LVEF 64% with grade 1 diastolic dysfunction.      He has been seeing Dr. Villatoro for peripheral arterial disease and significant venous incompetence with venous ulcers.  He gets Unna boots twice a week and has had venous surgeries with Dr. Villatoro.  I do not currently have those records but we will get them.  He also gets his laboratory values with Dr. Oliveira.  He still works full-time and  doing  in [place].  He does feel his heart racing sometimes but has had no dizziness or syncope and it is rare.  He does get occasional chest heaviness, occurring about 2 times per week, nonexertional, lasting 10 to 15 minutes.  This is associated with short windedness.  He does smoke half pack of cigarettes a day.  He has had no fevers, chills or cough.  His energy level is stable.    Past Medical History:   Diagnosis Date   • Angina pectoris ([facility])    • CAD (coronary artery disease)     3/13   • DOMINGUEZ (dyspnea on exertion)    • Hyperlipidemia    • Leg pain    • PAD (peripheral artery disease) ([facility])    • PVC (premature ventricular contraction)    • PVD (peripheral vascular disease) ([facility])          Past Surgical History:   Procedure Laterality Date   • ILIAC ARTERY STENT  03/2013   • SHOULDER SURGERY  2013       Current Outpatient Medications on File Prior to Visit   Medication Sig Dispense Refill   • Ascorbic Acid (VITAMIN C PO) Take 1,000 mg by mouth Daily.     • aspirin 325 MG tablet Take 1 tablet by mouth Daily.     • cholecalciferol (VITAMIN D3) 25 MCG (1000 UT) tablet Take 5 tablets by mouth Daily.     • ciprofloxacin (Cipro) 500 MG tablet Take 1 tablet by mouth 2 (Two) Times a Day. 20 tablet 2   • clopidogrel (PLAVIX) 75 MG tablet Take 1 tablet by mouth once daily (Patient taking differently: Take 1 tablet by mouth Daily.) 90 tablet 0   • cyclobenzaprine (FLEXERIL) 10 MG tablet Take 1 tablet by mouth Every Night.     • Docusate Sodium (COLACE PO) Take 1 tablet by mouth Every Night.     • doxycycline (VIBRAMYCIN) 100 MG capsule Take 1 capsule by mouth 2 (Two) Times a Day. 20 capsule 2   • fluticasone (FLONASE) 50 MCG/ACT nasal spray 2 sprays into the nostril(s) as directed by provider Daily.     • meloxicam (MOBIC) 15 MG tablet Take 1 tablet by mouth Daily.     • Multiple Vitamin (MULTIVITAMIN) tablet Take 1 tablet by mouth Daily.     • traMADol (ULTRAM) 50 MG tablet Take 1 tablet by mouth  "Every 8 (Eight) Hours As Needed.     • [DISCONTINUED] atorvastatin (LIPITOR) 80 MG tablet Take 1 tablet by mouth Every Night. 90 tablet 3   • [DISCONTINUED] isosorbide mononitrate (IMDUR) 30 MG 24 hr tablet Take 2 tablets by mouth once daily 180 tablet 1   • [DISCONTINUED] metoprolol succinate XL (TOPROL-XL) 25 MG 24 hr tablet Take 1 tablet by mouth once daily 90 tablet 2   • [DISCONTINUED] acetaminophen (TYLENOL) 325 MG tablet Take 2 tablets by mouth Every 4 (Four) Hours As Needed for Mild Pain . (Patient not taking: Reported on 3/15/2023)     • [DISCONTINUED] ipratropium-albuterol (DUO-NEB) 0.5-2.5 mg/3 ml nebulizer Take 3 mL by nebulization 4 (Four) Times a Day. (Patient not taking: Reported on 3/15/2023) 120 mL 5     No current facility-administered medications on file prior to visit.       Social History     Socioeconomic History   • Marital status:    Tobacco Use   • Smoking status: Every Day     Packs/day: 0.50     Years: 35.00     Pack years: 17.50     Types: Cigarettes     Passive exposure: Current   • Smokeless tobacco: Never   • Tobacco comments:     caffeine use - 3 cups coffee daily   Substance and Sexual Activity   • Alcohol use: Yes   • Drug use: Yes     Types: Marijuana           ROS    Procedures    ECG 12 Lead    Date/Time: 3/15/2023 7:58 AM  Performed by: Ani Kam MD  Authorized by: Ani Kam MD   Comparison: compared with previous ECG   Similar to previous ECG  Rhythm: sinus rhythm    Clinical impression: normal ECG                Objective:      Vitals:    03/15/23 0750   BP: 132/84   Pulse: 89   Weight: 89.8 kg (198 lb)   Height: 182.9 cm (72\")     Body mass index is 26.85 kg/m².    Vitals reviewed.   Constitutional:       General: Not in acute distress.     Appearance: Well-developed. Not diaphoretic.   Eyes:      General: No scleral icterus.     Conjunctiva/sclera: Conjunctivae normal.   HENT:      Head: Normocephalic and atraumatic.   Neck:      Thyroid: No " thyromegaly.      Vascular: No carotid bruit or JVD.      Lymphadenopathy: No cervical adenopathy.   Pulmonary:      Effort: Pulmonary effort is normal. No respiratory distress.      Breath sounds: Normal breath sounds. No wheezing. No rhonchi. No rales.   Chest:      Chest wall: Not tender to palpatation.   Cardiovascular:      Normal rate. Regular rhythm.      Murmurs: There is no murmur.      No gallop.      Comments: B/l unna boots  Pulses:     Carotid: 2+ bilaterally.     Radial: 2+ bilaterally.     Dorsalis pedis: 0 bilaterally.     Posterior tibial: 0 bilaterally.  Edema:     Peripheral edema absent.   Abdominal:      General: Bowel sounds are normal. There is no distension or abdominal bruit.      Palpations: Abdomen is soft. There is no abdominal mass.      Tenderness: There is no abdominal tenderness.   Musculoskeletal:         General: No deformity.      Extremities: No clubbing present.     Cervical back: Neck supple. Skin:     General: Skin is warm and dry. There is no cyanosis.      Coloration: Skin is not pale.      Findings: No rash.   Neurological:      Mental Status: Alert and oriented to person, place, and time.      Cranial Nerves: No cranial nerve deficit.   Psychiatric:         Judgment: Judgment normal.         Lab Review:       Assessment:      Diagnosis Plan   1. Arterial vascular disease        2. Tobacco use        3. Hyperlipidemia, unspecified hyperlipidemia type        4. Mild coronary artery disease          1. PAD, s/p left iliac stent.  Follows with Dr. Villatoro.   2. CAD with diagonal stenosis, 3/13. On asa and plavix.  Is on isosorbide for angina.  Is having a little more angina, prescribing sublingual nitroglycerin.  3. Hyperlipidemia.  On atorvastatin.  4. Tobacco use. I advised him to stop smoking.   5. Varicose veins in BLE, follows with Dr. Villatoro.   6. PACs.  On metoprolol     Plan:       See Eliana in 1 year and will need a repeat stress study in 1 year.  Increase metoprolol to  50 mg daily, maintain isosorbide 30 mg twice daily.  Maintain atorvastatin 80 mg daily.  We will get records from Dr. Oliveira and from vascular.

## 2023-04-05 RX ORDER — ISOSORBIDE MONONITRATE 30 MG/1
TABLET, EXTENDED RELEASE ORAL
Qty: 180 TABLET | Refills: 3 | Status: SHIPPED | OUTPATIENT
Start: 2023-04-05

## 2023-06-06 RX ORDER — CLOPIDOGREL BISULFATE 75 MG/1
TABLET ORAL
Qty: 90 TABLET | Refills: 0 | Status: SHIPPED | OUTPATIENT
Start: 2023-06-06

## 2023-07-20 PROBLEM — I48.0 PAF (PAROXYSMAL ATRIAL FIBRILLATION): Status: ACTIVE | Noted: 2023-07-01

## 2023-07-20 PROBLEM — J18.9 PNEUMONIA: Status: RESOLVED | Noted: 2019-05-20 | Resolved: 2023-07-20

## 2023-07-21 PROBLEM — I47.1 PAROXYSMAL SVT (SUPRAVENTRICULAR TACHYCARDIA): Status: ACTIVE | Noted: 2023-07-21

## 2023-07-21 PROBLEM — I47.10 PAROXYSMAL SVT (SUPRAVENTRICULAR TACHYCARDIA): Status: ACTIVE | Noted: 2023-07-21

## 2023-07-21 PROBLEM — I49.1 APC (ATRIAL PREMATURE CONTRACTIONS): Status: ACTIVE | Noted: 2023-07-21

## 2023-07-21 PROBLEM — I45.5 SINUS PAUSE: Status: ACTIVE | Noted: 2023-07-21

## 2023-07-28 ENCOUNTER — HOSPITAL ENCOUNTER (OUTPATIENT)
Dept: CARDIOLOGY | Facility: HOSPITAL | Age: 73
Discharge: HOME | End: 2023-07-28
Payer: MEDICARE

## 2023-07-28 VITALS
HEART RATE: 78 BPM | BODY MASS INDEX: 28.31 KG/M2 | OXYGEN SATURATION: 100 % | SYSTOLIC BLOOD PRESSURE: 114 MMHG | DIASTOLIC BLOOD PRESSURE: 72 MMHG | WEIGHT: 209 LBS | HEIGHT: 72 IN

## 2023-07-28 DIAGNOSIS — I48.0 PAF (PAROXYSMAL ATRIAL FIBRILLATION): ICD-10-CM

## 2023-07-28 LAB
AORTIC ARCH: 2.5 CM
AORTIC DIMENSIONLESS INDEX: 0.8 (DI)
ASCENDING AORTA: 3.5 CM
BH CV ECHO MEAS - ACS: 1.24 CM
BH CV ECHO MEAS - AO MAX PG: 11.7 MMHG
BH CV ECHO MEAS - AO MEAN PG: 5.3 MMHG
BH CV ECHO MEAS - AO ROOT DIAM: 3.9 CM
BH CV ECHO MEAS - AO V2 MAX: 171 CM/SEC
BH CV ECHO MEAS - AO V2 VTI: 32.4 CM
BH CV ECHO MEAS - AVA(I,D): 2.6 CM2
BH CV ECHO MEAS - EDV(CUBED): 85.8 ML
BH CV ECHO MEAS - EDV(MOD-SP2): 107 ML
BH CV ECHO MEAS - EDV(MOD-SP4): 113 ML
BH CV ECHO MEAS - EF(MOD-BP): 62.1 %
BH CV ECHO MEAS - EF(MOD-SP2): 60.7 %
BH CV ECHO MEAS - EF(MOD-SP4): 62.8 %
BH CV ECHO MEAS - ESV(CUBED): 24.5 ML
BH CV ECHO MEAS - ESV(MOD-SP2): 42 ML
BH CV ECHO MEAS - ESV(MOD-SP4): 42 ML
BH CV ECHO MEAS - FS: 34.1 %
BH CV ECHO MEAS - IVS/LVPW: 1.09 CM
BH CV ECHO MEAS - IVSD: 0.97 CM
BH CV ECHO MEAS - LAT PEAK E' VEL: 8.3 CM/SEC
BH CV ECHO MEAS - LV DIASTOLIC VOL/BSA (35-75): 52.1 CM2
BH CV ECHO MEAS - LV MASS(C)D: 134.4 GRAMS
BH CV ECHO MEAS - LV MAX PG: 7 MMHG
BH CV ECHO MEAS - LV MEAN PG: 2.7 MMHG
BH CV ECHO MEAS - LV SYSTOLIC VOL/BSA (12-30): 19.3 CM2
BH CV ECHO MEAS - LV V1 MAX: 131.9 CM/SEC
BH CV ECHO MEAS - LV V1 VTI: 26.8 CM
BH CV ECHO MEAS - LVIDD: 4.4 CM
BH CV ECHO MEAS - LVIDS: 2.9 CM
BH CV ECHO MEAS - LVOT AREA: 3.2 CM2
BH CV ECHO MEAS - LVOT DIAM: 2.02 CM
BH CV ECHO MEAS - LVPWD: 0.89 CM
BH CV ECHO MEAS - MED PEAK E' VEL: 9 CM/SEC
BH CV ECHO MEAS - MR MAX PG: 18.6 MMHG
BH CV ECHO MEAS - MR MAX VEL: 215.8 CM/SEC
BH CV ECHO MEAS - MV A DUR: 0.11 SEC
BH CV ECHO MEAS - MV A MAX VEL: 105.6 CM/SEC
BH CV ECHO MEAS - MV DEC SLOPE: 271.6 CM/SEC2
BH CV ECHO MEAS - MV DEC TIME: 286 MSEC
BH CV ECHO MEAS - MV E MAX VEL: 84.8 CM/SEC
BH CV ECHO MEAS - MV E/A: 0.8
BH CV ECHO MEAS - MV MAX PG: 4.6 MMHG
BH CV ECHO MEAS - MV MEAN PG: 1.42 MMHG
BH CV ECHO MEAS - MV P1/2T: 70.3 MSEC
BH CV ECHO MEAS - MV V2 VTI: 24.2 CM
BH CV ECHO MEAS - MVA(P1/2T): 3.1 CM2
BH CV ECHO MEAS - MVA(VTI): 3.5 CM2
BH CV ECHO MEAS - PA ACC TIME: 0.21 SEC
BH CV ECHO MEAS - PA V2 MAX: 95 CM/SEC
BH CV ECHO MEAS - PULM A REVS DUR: 0.18 SEC
BH CV ECHO MEAS - PULM A REVS VEL: 37.4 CM/SEC
BH CV ECHO MEAS - PULM DIAS VEL: 38.8 CM/SEC
BH CV ECHO MEAS - PULM S/D: 1.3
BH CV ECHO MEAS - PULM SYS VEL: 50.5 CM/SEC
BH CV ECHO MEAS - QP/QS: 0.63
BH CV ECHO MEAS - RV MAX PG: 1.88 MMHG
BH CV ECHO MEAS - RV V1 MAX: 68.6 CM/SEC
BH CV ECHO MEAS - RV V1 VTI: 14.2 CM
BH CV ECHO MEAS - RVOT DIAM: 2.21 CM
BH CV ECHO MEAS - SI(MOD-SP2): 29.9 ML/M2
BH CV ECHO MEAS - SI(MOD-SP4): 32.7 ML/M2
BH CV ECHO MEAS - SV(LVOT): 85.4 ML
BH CV ECHO MEAS - SV(MOD-SP2): 65 ML
BH CV ECHO MEAS - SV(MOD-SP4): 71 ML
BH CV ECHO MEAS - SV(RVOT): 54.2 ML
BH CV ECHO MEAS - TAPSE (>1.6): 2.39 CM
BH CV ECHO MEASUREMENTS AVERAGE E/E' RATIO: 9.8
BH CV XLRA - RV BASE: 3.3 CM
BH CV XLRA - RV LENGTH: 7.2 CM
BH CV XLRA - RV MID: 2.44 CM
BH CV XLRA - TDI S': 15 CM/SEC
LEFT ATRIUM VOLUME INDEX: 21.7 ML/M2
SINUS: 3.7 CM
STJ: 3.2 CM

## 2023-07-28 PROCEDURE — 25510000001 PERFLUTREN (DEFINITY) 8.476 MG IN SODIUM CHLORIDE (PF) 0.9 % 10 ML INJECTION: Performed by: NURSE PRACTITIONER

## 2023-07-28 PROCEDURE — 93306 TTE W/DOPPLER COMPLETE: CPT

## 2023-07-28 PROCEDURE — 93306 TTE W/DOPPLER COMPLETE: CPT | Performed by: INTERNAL MEDICINE

## 2023-07-28 RX ADMIN — PERFLUTREN 2 ML: 6.52 INJECTION, SUSPENSION INTRAVENOUS at 08:24

## 2023-07-28 NOTE — PROGRESS NOTES
Please call patient.  Echocardiogram shows normal EF, normal chamber size, and trace MR.  Echo looks great.  Keep scheduled appointment with Dr. Kam.

## 2023-07-31 ENCOUNTER — TELEPHONE (OUTPATIENT)
Dept: CARDIOLOGY | Facility: CLINIC | Age: 73
End: 2023-07-31
Payer: MEDICARE

## 2023-08-03 ENCOUNTER — TRANSCRIBE ORDERS (OUTPATIENT)
Dept: ADMINISTRATIVE | Facility: HOSPITAL | Age: 73
End: 2023-08-03
Payer: MEDICARE

## 2023-08-03 ENCOUNTER — LAB (OUTPATIENT)
Dept: LAB | Facility: HOSPITAL | Age: 73
End: 2023-08-03
Payer: MEDICARE
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
## 2023-08-03 DIAGNOSIS — Z79.899 ENCOUNTER FOR LONG-TERM CURRENT USE OF MEDICATION: ICD-10-CM

## 2023-08-03 DIAGNOSIS — Z79.899 ENCOUNTER FOR LONG-TERM CURRENT USE OF MEDICATION: Primary | ICD-10-CM

## 2023-08-03 LAB
ALBUMIN SERPL-MCNC: 4.2 G/DL (ref 3.5–5.2)
ALBUMIN/GLOB SERPL: 1.8 G/DL
ALP SERPL-CCNC: 78 U/L (ref 39–117)
ALT SERPL W P-5'-P-CCNC: 23 U/L (ref 1–41)
ANION GAP SERPL CALCULATED.3IONS-SCNC: 10 MMOL/L (ref 5–15)
AST SERPL-CCNC: 31 U/L (ref 1–40)
BILIRUB SERPL-MCNC: 0.2 MG/DL (ref 0–1.2)
BUN SERPL-MCNC: 20 MG/DL (ref 8–23)
BUN/CREAT SERPL: 30.3 (ref 7–25)
CALCIUM SPEC-SCNC: 9.2 MG/DL (ref 8.6–10.5)
CHLORIDE SERPL-SCNC: 106 MMOL/L (ref 98–107)
CO2 SERPL-SCNC: 26 MMOL/L (ref 22–29)
CREAT SERPL-MCNC: 0.66 MG/DL (ref 0.76–1.27)
DEPRECATED RDW RBC AUTO: 40.5 FL (ref 37–54)
EGFRCR SERPLBLD CKD-EPI 2021: 99 ML/MIN/1.73
ERYTHROCYTE [DISTWIDTH] IN BLOOD BY AUTOMATED COUNT: 12 % (ref 12.3–15.4)
GLOBULIN UR ELPH-MCNC: 2.3 GM/DL
GLUCOSE SERPL-MCNC: 116 MG/DL (ref 65–99)
HCT VFR BLD AUTO: 39 % (ref 37.5–51)
HGB BLD-MCNC: 13.4 G/DL (ref 13–17.7)
HOLD SPECIMEN: NORMAL
MCH RBC QN AUTO: 31.9 PG (ref 26.6–33)
MCHC RBC AUTO-ENTMCNC: 34.4 G/DL (ref 31.5–35.7)
MCV RBC AUTO: 92.9 FL (ref 79–97)
PLATELET # BLD AUTO: 154 10*3/MM3 (ref 140–450)
PMV BLD AUTO: 9.4 FL (ref 6–12)
POTASSIUM SERPL-SCNC: 4 MMOL/L (ref 3.5–5.2)
PROT SERPL-MCNC: 6.5 G/DL (ref 6–8.5)
RBC # BLD AUTO: 4.2 10*6/MM3 (ref 4.14–5.8)
SODIUM SERPL-SCNC: 142 MMOL/L (ref 136–145)
WBC NRBC COR # BLD: 8.26 10*3/MM3 (ref 3.4–10.8)

## 2023-08-03 PROCEDURE — 85027 COMPLETE CBC AUTOMATED: CPT

## 2023-08-03 PROCEDURE — 86618 LYME DISEASE ANTIBODY: CPT

## 2023-08-03 PROCEDURE — 80053 COMPREHEN METABOLIC PANEL: CPT

## 2023-08-03 PROCEDURE — 36415 COLL VENOUS BLD VENIPUNCTURE: CPT

## 2023-08-04 LAB — B BURGDOR IGG+IGM SER QL IA: NEGATIVE

## 2023-08-08 ENCOUNTER — TELEPHONE (OUTPATIENT)
Dept: CARDIOLOGY | Facility: CLINIC | Age: 73
End: 2023-08-08
Payer: MEDICARE

## 2023-08-08 NOTE — TELEPHONE ENCOUNTER
Spoke with Dr. Villatoro about metoprolol.  I am not sure this is really instrumental in his poor wound healing and I have been using metoprolol for his CAD, anginal chest pain and PACs.  I would probably have him remain on this at this point and he is in agreement as well.  I think his issue is likely the tobacco use - he needs to stop smoking.

## 2023-08-31 ENCOUNTER — TELEPHONE (OUTPATIENT)
Dept: CARDIOLOGY | Facility: CLINIC | Age: 73
End: 2023-08-31
Payer: MEDICARE

## 2023-08-31 NOTE — TELEPHONE ENCOUNTER
Patient called and stated that the RX for Eliquis is going to cost $483/month.  I have called and spoken with patient wife and advised her to find out what is formulary and give us a call with that information and we can address with provider.    CB: [phone]    Woody

## 2023-09-07 RX ORDER — CLOPIDOGREL BISULFATE 75 MG/1
TABLET ORAL
Qty: 90 TABLET | Refills: 0 | Status: SHIPPED | OUTPATIENT
Start: 2023-09-07

## 2023-09-26 ENCOUNTER — OFFICE VISIT (OUTPATIENT)
Dept: CARDIOLOGY | Facility: CLINIC | Age: 73
End: 2023-09-26
Payer: MEDICARE

## 2023-09-26 VITALS
HEART RATE: 73 BPM | BODY MASS INDEX: 28.59 KG/M2 | SYSTOLIC BLOOD PRESSURE: 118 MMHG | WEIGHT: 211.1 LBS | OXYGEN SATURATION: 97 % | DIASTOLIC BLOOD PRESSURE: 80 MMHG | HEIGHT: 72 IN

## 2023-09-26 DIAGNOSIS — E78.2 MIXED HYPERLIPIDEMIA: ICD-10-CM

## 2023-09-26 DIAGNOSIS — I25.10 MILD CORONARY ARTERY DISEASE: ICD-10-CM

## 2023-09-26 DIAGNOSIS — Z72.0 TOBACCO USE: ICD-10-CM

## 2023-09-26 DIAGNOSIS — I48.0 PAF (PAROXYSMAL ATRIAL FIBRILLATION): Primary | ICD-10-CM

## 2023-09-26 DIAGNOSIS — I73.9 PAD (PERIPHERAL ARTERY DISEASE): ICD-10-CM

## 2023-09-26 PROCEDURE — 93000 ELECTROCARDIOGRAM COMPLETE: CPT | Performed by: INTERNAL MEDICINE

## 2023-09-26 PROCEDURE — 99214 OFFICE O/P EST MOD 30 MIN: CPT | Performed by: INTERNAL MEDICINE

## 2023-09-26 PROCEDURE — 1160F RVW MEDS BY RX/DR IN RCRD: CPT | Performed by: INTERNAL MEDICINE

## 2023-09-26 PROCEDURE — 1159F MED LIST DOCD IN RCRD: CPT | Performed by: INTERNAL MEDICINE

## 2023-09-26 RX ORDER — METOPROLOL SUCCINATE 50 MG/1
50 TABLET, EXTENDED RELEASE ORAL DAILY
Qty: 90 TABLET | Refills: 3 | Status: SHIPPED | OUTPATIENT
Start: 2023-09-26

## 2023-09-26 RX ORDER — METOPROLOL SUCCINATE 50 MG/1
75 TABLET, EXTENDED RELEASE ORAL DAILY
Qty: 135 TABLET | Refills: 3 | Status: SHIPPED | OUTPATIENT
Start: 2023-09-26 | End: 2023-09-26 | Stop reason: SDUPTHER

## 2023-09-26 RX ORDER — CIPROFLOXACIN 500 MG/1
500 TABLET, FILM COATED ORAL 2 TIMES DAILY
COMMUNITY
Start: 2023-09-15 | End: 2023-09-26

## 2023-09-26 NOTE — PROGRESS NOTES
Date of Office Visit: 2023  Encounter Provider: Ani Kam MD  Place of Service: [facility] CARDIOLOGY  Patient Name: Derik Borja  :1950      Patient ID:  Derik Borja is a 73 y.o. male is here for  followup for CAD, PAF.        History of Present Illness    He has a history of hypertension, PAD with common iliac stent-history of ulcers lower extremities, hyperlipidemia, history of premature atrial complexes, tobacco use, PAF and COPD.     With his complaints, he had a 2-D echocardiogram with Doppler  done at [facility] on 2013. This showed ejection fraction of  55-60%, aortic valve calcification, and trace mitral insufficiency. He had a Lexiscan and  stress nuclear perfusion study which showed a small amount of inferior ischemia. Because  of his symptoms of claudication and his positive testing showing moderate occlusive  Disease.  He saw Dr. Brambila in consultation. He saw him on 2013, and set  him for an angiogram which was done on 2013. He did the coronary angiogram which  showed 30% diffuse left anterior descending stenosis, focal 70% large first diagonal  stenosis, 30% mid circumflex stenosis, and 30% right coronary artery stenosis. The left  main coronary artery was short. The right coronary artery was dominant. His abdominal  aorta was smooth and no significant stenosis. The left renal artery was single with a  40-50% proximal stenosis. I do not see a comment here about the right renal artery. The  common iliac arteries had significant disease on the left. The left had a focal 90%  stenosis. The right common iliac had a 30% stenosis. The right internal iliac artery was  patent. The left internal iliac artery was occluded. The superficial femoral artery was  occluded on the left along its entire length and reconstituted with collaterals at the  profunda. The common femoral artery had moderate, diffuse disease. There was  brisk,  three-vessel runoff to the foot. The right common femoral artery had mild disease. The  right superficial femoral artery was occluded for its entire length and reconstituted with  brisk profunda collaterals, and there was three-vessel runoff to the foot. He then  underwent stenting of the left common iliac artery receiving an 8.0 mm x 39 mm Omnilink  Elite stent. His SFA occlusions have been treated medically.         He did have a lymph node removed in his neck in November 2012.   His wife Holley is a cousin to the Rogers.  He has been smoking a pack a day for over 20 years. He and his wife are both in the  process of trying to quit.      He was told 2013 that his claudication can only be fixed with femoral-popliteal bypass.       Stress nuclear perfusion study done 4/8/2019 shows no evidence of ischemia.  He had a sleep evaluation done in 2019 showing mild obstructive sleep apnea for which he decided not to treat.     He had a wound on his left leg was acquired due to trauma on 6/23/2021.  He does have arterial insufficiency of the leg.  The wound on his right leg was also due to trauma on 10/1/2021 and also due to arterial insufficiency.  He had a Holter monitor done for 48 hours on 3/11/2021 which showed 7% PACs with 36 runs of nonsustained atrial tachycardia, longest lasting 43 beats.  Rare PVCs were noted and no VT, no pauses or bradycardia.     Echo done 4/11/22 showed LVEF 64% with grade 1 diastolic dysfunction.      He has been seeing Dr. Villatoro for peripheral arterial disease and significant venous incompetence with venous ulcers.  He gets Unna boots twice a week and has had venous surgeries with Dr. Villatoro.  I do not currently have those records but we will get them.  He also gets his laboratory values with Dr. Oliveira.  He still works full-time and doing  in [place].      Labs on 8/3/2023 show normal CMP and CBC.  Zio patch monitor done 7/6/2023, worn for 6 days shows atrial  fibrillation with four 3-4-second pauses, 4.4% PVCs, short runs of SVT, average heart rate of 79 bpm.  All the pauses noted occurred when the patient converted from atrial fibrillation to sinus rhythm.Echocardiogram done 7/28/2023 showed normal diastolic function, ejection fraction 62%, normal valvular structure and function.    He does feel in his heart goes in and out of atrial fibrillation.  He has minimal chest pain and is nonexertional.  He does have exertional dyspnea.  He has had no dizziness or syncope.  He has had wounds on both legs, the right leg is healed up and he has a therawrap on it.  His left leg still has a wound and he has an Unna boot on it.  He just finished a course of ciprofloxacin for leg wounds.  He is on chronic doxycycline.  He has no orthopnea or PND.    Past Medical History:   Diagnosis Date    APC (atrial premature contractions) 07/21/2023    1.5% APC burden noted on Holter monitor 7/2023    CAD (coronary artery disease)     3/13    Hyperlipidemia     PAD (peripheral artery disease)     PAF (paroxysmal atrial fibrillation) 07/2023    Pneumonia 05/20/2019    PVC (premature ventricular contraction)     Sinus pause 07/21/2023    For postconversion pauses noted after atrial fibrillation on Holter monitor 7/2023         Past Surgical History:   Procedure Laterality Date    ILIAC ARTERY STENT  03/2013    SHOULDER SURGERY  2013       Current Outpatient Medications on File Prior to Visit   Medication Sig Dispense Refill    apixaban (ELIQUIS) 5 MG tablet tablet Take 1 tablet by mouth 2 (Two) Times a Day. 60 tablet 11    Ascorbic Acid (VITAMIN C PO) Take 1,000 mg by mouth Daily.      atorvastatin (LIPITOR) 80 MG tablet Take 1 tablet by mouth Every Night. 90 tablet 3    cholecalciferol (VITAMIN D3) 25 MCG (1000 UT) tablet Take 5 tablets by mouth Daily.      ciprofloxacin (CIPRO) 500 MG tablet Take 1 tablet by mouth 2 (Two) Times a Day.      clopidogrel (PLAVIX) 75 MG tablet Take 1 tablet by mouth  once daily 90 tablet 0    cyclobenzaprine (FLEXERIL) 10 MG tablet Take 1 tablet by mouth Every Night.      Docusate Sodium (COLACE PO) Take 1 tablet by mouth Every Night.      doxycycline (VIBRAMYCIN) 100 MG capsule Take 1 capsule by mouth 2 (Two) Times a Day. (Patient taking differently: Take 1 capsule by mouth 2 (Two) Times a Day. Cipro plus the Doxy when he starts to have signs of cellulitis) 20 capsule 2    fluticasone (FLONASE) 50 MCG/ACT nasal spray 2 sprays into the nostril(s) as directed by provider Daily.      isosorbide mononitrate (IMDUR) 30 MG 24 hr tablet Take 2 tablets by mouth once daily 180 tablet 3    metoprolol succinate XL (TOPROL-XL) 50 MG 24 hr tablet Take 1.5 tablets by mouth Daily. 135 tablet 3    Multiple Vitamin (MULTIVITAMIN) tablet Take 1 tablet by mouth Daily.      nitroglycerin (NITROSTAT) 0.4 MG SL tablet 1 under the tongue as needed for angina, may repeat q5mins for up three doses 30 tablet 3    traMADol (ULTRAM) 50 MG tablet Take 1 tablet by mouth Every 8 (Eight) Hours As Needed.      [DISCONTINUED] meloxicam (MOBIC) 15 MG tablet Take 1 tablet by mouth Daily.       No current facility-administered medications on file prior to visit.       Social History     Socioeconomic History    Marital status:    Tobacco Use    Smoking status: Every Day     Packs/day: 0.50     Years: 35.00     Pack years: 17.50     Types: Cigarettes     Passive exposure: Current    Smokeless tobacco: Never    Tobacco comments:     caffeine use - 3 cups coffee daily   Vaping Use    Vaping Use: Never used   Substance and Sexual Activity    Alcohol use: Yes    Drug use: Yes     Types: Marijuana           ROS    Procedures    ECG 12 Lead    Date/Time: 9/26/2023 2:03 PM  Performed by: Ani Kam MD  Authorized by: Ani Kam MD   Comparison: compared with previous ECG   Similar to previous ECG  Rhythm: sinus rhythm    Clinical impression: normal ECG            Objective:      Vitals:     "09/26/23 1355 09/26/23 1356   BP: 112/78 118/80   BP Location: Right arm Left arm   Patient Position: Sitting Sitting   Cuff Size: Large Adult Large Adult   Pulse: 73    SpO2: 97%    Weight: 95.8 kg (211 lb 1.6 oz)    Height: 182.9 cm (72\")      Body mass index is 28.63 kg/m².    Vitals reviewed.   Constitutional:       General: Not in acute distress.     Appearance: Well-developed. Not diaphoretic.   Eyes:      General: No scleral icterus.     Conjunctiva/sclera: Conjunctivae normal.   HENT:      Head: Normocephalic and atraumatic.   Neck:      Thyroid: No thyromegaly.      Vascular: No carotid bruit or JVD.      Lymphadenopathy: No cervical adenopathy.   Pulmonary:      Effort: Pulmonary effort is normal. No respiratory distress.      Breath sounds: Normal breath sounds. No wheezing. No rhonchi. No rales.   Chest:      Chest wall: Not tender to palpatation.   Cardiovascular:      Normal rate. Regular rhythm.      Murmurs: There is no murmur.      No gallop.  No rub.   Pulses:     Intact distal pulses.      Carotid: 2+ bilaterally.     Radial: 2+ bilaterally.     Dorsalis pedis: 2+ bilaterally.     Posterior tibial: 2+ bilaterally.  Edema:     Peripheral edema absent.   Abdominal:      General: Bowel sounds are normal. There is no distension or abdominal bruit.      Palpations: Abdomen is soft. There is no abdominal mass.      Tenderness: There is no abdominal tenderness.   Musculoskeletal:         General: No deformity.      Extremities: No clubbing present.     Cervical back: Neck supple. Skin:     General: Skin is warm and dry. There is no cyanosis.      Coloration: Skin is not pale.      Findings: No rash.   Neurological:      Mental Status: Alert and oriented to person, place, and time.      Cranial Nerves: No cranial nerve deficit.   Psychiatric:         Judgment: Judgment normal.       Lab Review:       Assessment:      Diagnosis Plan   1. PAF (paroxysmal atrial fibrillation)        2. Mixed hyperlipidemia   "      3. Mild coronary artery disease        4. PAD (peripheral artery disease)        5. Tobacco use              PAD, s/p left iliac stent.  Follows with Dr. Villatoro.   CAD with diagonal stenosis, 3/13. On asa and plavix, isosorbide for angina.  His angina currently is stable.  Hyperlipidemia.  On atorvastatin.  Tobacco use. I advised him to stop smoking.   Varicose veins in BLE, follows with Dr. Villatoro.     PACs.  On metoprolol  MRSA in leg wound.  Has had bilateral leg wounds, the right leg is healed up, the left leg still has a Unna boot.  He follows in wound care and with vascular.       Plan:       Decrease metoprolol to 50 mg nightly and start sotalol 40 mg twice daily for atrial fibrillation.  He does feel when he is going out of atrial fibrillation and gets a little fast when he is in A-fib.  Tried to diminish the amount of atrial fibrillation that he has to maintain more sinus rhythm.  We may have to stop his metoprolol when he is seen back in the office.  See Eliana in 1 month.  Advised that he not be on ciprofloxacin while he is on sotalol.

## 2023-10-23 ENCOUNTER — TELEPHONE (OUTPATIENT)
Dept: CARDIOLOGY | Facility: CLINIC | Age: 73
End: 2023-10-23
Payer: MEDICARE

## 2023-10-23 NOTE — TELEPHONE ENCOUNTER
Caller: BILL PAUL    Relationship:[employer]    Callback number: [phone]   Is it ok to leave a message: [x] Yes [] No    Requested medication for samples: ELIQUIS 5MG    How much medication does the patient currently have left: 1 WEEK    Who will be picking up the samples: BILL    Do you need information about patient financial assistance for this medication: [] Yes [x] No    Additional details provided[facility]

## 2023-10-30 ENCOUNTER — OFFICE VISIT (OUTPATIENT)
Dept: CARDIOLOGY | Facility: CLINIC | Age: 73
End: 2023-10-30
Payer: MEDICARE

## 2023-10-30 VITALS
HEART RATE: 64 BPM | DIASTOLIC BLOOD PRESSURE: 70 MMHG | BODY MASS INDEX: 28.96 KG/M2 | HEIGHT: 72 IN | WEIGHT: 213.8 LBS | SYSTOLIC BLOOD PRESSURE: 110 MMHG

## 2023-10-30 DIAGNOSIS — I49.3 PVCS (PREMATURE VENTRICULAR CONTRACTIONS): ICD-10-CM

## 2023-10-30 DIAGNOSIS — I48.0 PAF (PAROXYSMAL ATRIAL FIBRILLATION): Primary | ICD-10-CM

## 2023-10-30 DIAGNOSIS — I45.5 SINUS PAUSE: ICD-10-CM

## 2023-10-30 DIAGNOSIS — I25.10 MILD CORONARY ARTERY DISEASE: ICD-10-CM

## 2023-10-30 DIAGNOSIS — I49.1 APC (ATRIAL PREMATURE CONTRACTIONS): ICD-10-CM

## 2023-10-30 DIAGNOSIS — E78.2 MIXED HYPERLIPIDEMIA: ICD-10-CM

## 2023-10-30 DIAGNOSIS — I47.10 PAROXYSMAL SVT (SUPRAVENTRICULAR TACHYCARDIA): ICD-10-CM

## 2023-10-30 DIAGNOSIS — Z72.0 TOBACCO USE: ICD-10-CM

## 2023-10-30 DIAGNOSIS — I73.9 PAD (PERIPHERAL ARTERY DISEASE): ICD-10-CM

## 2023-10-30 PROCEDURE — 93000 ELECTROCARDIOGRAM COMPLETE: CPT | Performed by: NURSE PRACTITIONER

## 2023-10-30 PROCEDURE — 99214 OFFICE O/P EST MOD 30 MIN: CPT | Performed by: NURSE PRACTITIONER

## 2023-10-30 PROCEDURE — 1160F RVW MEDS BY RX/DR IN RCRD: CPT | Performed by: NURSE PRACTITIONER

## 2023-10-30 PROCEDURE — 1159F MED LIST DOCD IN RCRD: CPT | Performed by: NURSE PRACTITIONER

## 2023-10-30 NOTE — PROGRESS NOTES
"      [facility] CARDIOLOGY  [address]  [address]  [place] [address]  Phone: [phone]  Fax: [phone]  Patient Name: Derik Borja  :1950  Age: 73 y.o.  Primary Cardiologist: Ani Kam MD  Encounter Provider:  NIURKA Mcfarland    History of Present Illness     Derik Borja is a 73 y.o.  male whose medical history includes hyperlipidemia, peripheral artery disease s/p common iliac stent and lower extremity ulcers, COPD, tobacco use.  He is followed in our office by Dr. Kam for paroxysmal atrial fibrillation, and PACs. I have reviewed the past medical records in preparation of today's visit.     10/30/23 Follow-up:  He is here for 1 month follow-up and I am seeing him for the first time today.  At last visit he was seen after Holter monitor showed evidence of atrial fibrillation and pauses when he converted from A-fib to sinus rhythm.  His metoprolol was reduced and he was started on 40 mg sotalol twice daily.  After reducing the metoprolol his blood pressure was a little high at first but now he is running 1 20-1 30s/70s.  He is occasionally lightheaded if he stands up too fast but he has not had any falls or near syncope.  He is still feeling his heart skipped beats at times but it is less frequent on the sotalol.  He has not had any angina since the last appointment.  He has chronic but stable dyspnea with exertion.  His leg swelling is controlled.  He is only having to see Dr. Villatoro's office about once a week now as his wounds are mostly healed.    Data Review     The following data was reviewed by NIURKA Mcfarland on 10/30/23:    Vital Signs:   /70 (BP Location: Left arm)   Pulse 64   Ht 182.9 cm (72\")   Wt 97 kg (213 lb 12.8 oz)   BMI 29.00 kg/m²       Weight:  Wt Readings from Last 3 Encounters:   10/30/23 97 kg (213 lb 12.8 oz)   23 95.8 kg (211 lb 1.6 oz)   23 94.8 kg (209 lb)     Body mass index " is 29 kg/m².    Below is a summary of pertinent cardiology findings:  He has been smoking for over 20 years.  February 2013 he had an echocardiogram which showed EF 55 to 60%, aortic valve calcification, and trace mitral insufficiency.  Lexiscan stress nuclear perfusion study at that time showed a small amount of inferior wall ischemia.  He also had symptoms of claudication and testing showing moderate occlusive disease.  He was evaluated by Dr. Jean Brambila.  In March 2013 he had coronary angiogram which showed 30% diffuse LAD stenosis, a focal 70% large first diagonal stenosis, 30% mid left circumflex stenosis, and a 30% RCA stenosis; the left main coronary artery was short but patent and the RCA was dominant.  The abdominal aorta was smooth and had no significant stenosis.  The left renal artery was single with a 40 to 50% proximal stenosis and the right renal artery is not mentioned in the report.  The common iliac arteries had significant disease on the left with a focal 90% stenosis and a 30% stenosis of the right.  The right iliac artery was patent, the left internal iliac artery was occluded, the SFA was occluded along the left along its entire length and reconstituted with collaterals at the profunda.  The common femoral artery had moderate diffuse disease.  There was brisk three-vessel runoff to the foot, the right common femoral artery had mild disease, the right SFA was occluded for its entire length and reconstitute with brisk profunda collaterals and three-vessel runoff to the foot.  He did receive an 8.0 x 39 mm Omnilink Elite stent to the left common iliac artery.  The SFA occlusions were treated medically.  April 2019 stress nuclear perfusion study showed no evidence of ischemia.  March 2021 he had a 48-hour Holter monitor which showed 7% PACs, 36 runs of nonsustained atrial tachycardia with the longest being 43 beats, and rare PVCs.  June 2021 he had a wound on his left leg due to trauma, and in  October 2021 he also had a wound on his right leg due to trauma.  He follows with Dr. Ean Villatoro for significant venous incompetence with venous ulcers.  He has had surgery with Dr. Villatoro and also gets Unna boot therapy.  He is also on chronic doxycycline.  April 2022 echocardiogram showed EF 64% and grade 1 LV diastolic dysfunction.  July 2023 Zio patch monitor worn for 6 days showed atrial fibrillation with for 3 to 4-second pauses, 4.4% PVCs, short runs of SVT, and the average heart rate 79 bpm.  All pauses occurred when the patient converted from A-fib to sinus rhythm.  Echocardiogram showed normal diastolic function, EF 62%, and no significant valvular disease.  He was started on apixaban.    Labs:  08/03/2023:  cr 0.7, K 4.0, otherwise unremarkable CMP, Hgb 13.4, Plt 154      ECG 12 Lead    Date/Time: 10/30/2023 2:44 PM  Performed by: Theresa Butterfield APRN    Authorized by: Theresa Butterfield APRN  Comparison: compared with previous ECG from 9/26/2023  Similar to previous ECG  Rhythm: sinus rhythm  Rate: normal  BPM: 64    Clinical impression: normal ECG          Medications     Allergies as of 10/30/2023 - Reviewed 10/30/2023   Allergen Reaction Noted    Penicillins Swelling 11/20/2012    Iodine Itching 03/03/2016       Current Outpatient Medications   Medication Instructions    apixaban (ELIQUIS) 5 mg, Oral, 2 Times Daily    Ascorbic Acid (VITAMIN C PO) 1,000 mg, Oral, Daily    atorvastatin (LIPITOR) 80 mg, Oral, Nightly    cholecalciferol (VITAMIN D3) 5,000 Units, Oral, Daily    clopidogrel (PLAVIX) 75 MG tablet Take 1 tablet by mouth once daily    cyclobenzaprine (FLEXERIL) 10 mg, Oral, Nightly    Docusate Sodium (COLACE PO) 1 tablet, Oral, Nightly    doxycycline (VIBRAMYCIN) 100 mg, Oral, 2 Times Daily    fluticasone (FLONASE) 50 MCG/ACT nasal spray 2 sprays, Nasal, Daily    isosorbide mononitrate (IMDUR) 30 MG 24 hr tablet Take 2 tablets by mouth once daily    metoprolol succinate XL  (TOPROL-XL) 50 mg, Oral, Daily    Multiple Vitamin (MULTIVITAMIN) tablet 1 tablet, Oral, Daily    nitroglycerin (NITROSTAT) 0.4 MG SL tablet 1 under the tongue as needed for angina, may repeat q5mins for up three doses    Sotalol HCl AF 40 mg, Oral, 2 Times Daily    traMADol (ULTRAM) 50 MG tablet 1 tablet, Oral, Every 8 Hours PRN        Past History, Review of Systems, Exam     Past Medical History:   Diagnosis Date    APC (atrial premature contractions) 07/21/2023    CAD (coronary artery disease)     Hyperlipidemia     PAD (peripheral artery disease)     PAF (paroxysmal atrial fibrillation) 07/2023    Pneumonia 05/20/2019    PVC (premature ventricular contraction)     Sinus pause 07/21/2023       Past Surgical History:   has a past surgical history that includes Iliac artery stent (03/2013) and Shoulder surgery (2013).     Social History     Socioeconomic History    Marital status:    Tobacco Use    Smoking status: Every Day     Packs/day: 0.50     Years: 35.00     Additional pack years: 0.00     Total pack years: 17.50     Types: Cigarettes     Passive exposure: Current    Smokeless tobacco: Never    Tobacco comments:     caffeine use - 3 cups coffee daily   Vaping Use    Vaping Use: Never used   Substance and Sexual Activity    Alcohol use: Yes     Comment: once a month - rarely    Drug use: Yes     Types: Marijuana       Review of Systems   Cardiovascular:  Positive for claudication, dyspnea on exertion and palpitations. Negative for chest pain, cyanosis, irregular heartbeat, leg swelling, near-syncope, orthopnea, paroxysmal nocturnal dyspnea and syncope.       Vitals reviewed.   Constitutional:       Appearance: Not in distress.   Eyes:      Conjunctiva/sclera: Conjunctivae normal.      Pupils: Pupils are equal, round, and reactive to light.   HENT:      Head: Normocephalic.      Nose: Nose normal.    Mouth/Throat:      Pharynx: Oropharynx is clear.   Neck:      Vascular: JVD normal.   Pulmonary:       Effort: Pulmonary effort is normal.      Breath sounds: Normal breath sounds. No wheezing. No rhonchi. No rales.   Cardiovascular:      Normal rate. Regular rhythm. Normal S1. Normal S2.       Murmurs: There is no murmur.      Comments: Bilateral leg wraps  Pulses:     Intact distal pulses.   Edema:     Peripheral edema absent.   Abdominal:      General: Bowel sounds are normal. There is no distension.      Palpations: Abdomen is soft.      Tenderness: There is no abdominal tenderness.   Musculoskeletal: Normal range of motion.      Cervical back: Normal range of motion and neck supple. Skin:     General: Skin is warm and dry.   Neurological:      Mental Status: Alert and oriented to person, place and time.   Psychiatric:         Attention and Perception: Attention normal.         Mood and Affect: Mood normal.         Speech: Speech normal.         Behavior: Behavior is cooperative.          Assessment and Plan     Assessment:  1. PAF (paroxysmal atrial fibrillation)    2. Paroxysmal SVT (supraventricular tachycardia)    3. PVCs (premature ventricular contractions)    4. APC (atrial premature contractions)    5. Mild coronary artery disease    6. PAD (peripheral artery disease)    7. Mixed hyperlipidemia    8. Sinus pause    9. Tobacco use         Paroxysmal atrial fibrillation: This was noted in July 2023.  He did have conversion pauses when switching from A-fib to sinus rhythm.  He is maintaining sinus rhythm on 40 mg sotalol twice daily.  His [id number] score is 2 and he is anticoagulated with 5 mg apixaban twice daily.  Paroxysmal SVT: He was noted to have episodes of nonsustained SVT on March 2021 Holter monitor.  There was then concern he had A-fib in July 2023 monitor showed evidence of atrial fibrillation with average heart rate 79 bpm but 3 to 4-second pauses when he would convert from A-fib to sinus rhythm.  He is on 50 mg Toprol daily.  PVCs: July 2023 Zio patch monitor showed PVC burden to be  4.4%.  PACs: Previously noted to have atrial tachycardia after he would have a PAC.  He is now having issues with paroxysmal atrial fibrillation but this is better on sotalol.  Sinus pause: He was having conversion pauses.  It sounds like he is having fewer episodes of A-fib on sotalol.  Mild coronary artery disease: He had coronary angiography in March 2023 showing nonobstructive disease of the LAD, left circumflex, and RCA; there was a 70% focal lesion of a large first diagonal branch.  He did have a nuclear stress test in April 2019 showing no evidence of ischemia.  His medical therapy includes Plavix, isosorbide, metoprolol succinate, and atorvastatin.  He occasionally uses nitroglycerin for chest pain but this is rare.  Peripheral artery disease.  He has a long history of peripheral artery disease with history of stent to the left common iliac artery and occluded bilateral SFA arteries.  He is treated by Dr. Ean Villatoro.  He also has severe venous insufficiency and was having leg ulcers which are now mostly healed.  He is on atorvastatin and Plavix.  Hyperlipidemia: No recent lipids to review; he is treated with 80 mg atorvastatin daily.  Tobacco use: He smoked for 20+ years but is now cut down to about 5 cigarettes daily.    Mr. Borja is a patient of Dr. Kam's with history of nonobstructive coronary artery disease and more recently paroxysmal atrial fibrillation with conversion pauses.  It sounds like he is having fewer episodes of A-fib on 40 mg sotalol twice daily.  His blood pressure is controlled on 50 mg metoprolol succinate daily and he is on a coagulated with apixaban.  Eliquis is going to be expensive for him; I gave him the 1 [address] [employer] number to call for assistance.  We may have to fill financial paperwork.  In the meantime I am going to see him back in 2 months and he will keep his March 2024 appointment with Dr. Kam.    Return for Follow-up with NIURKA Bautista in 2  months.  Orders Placed This Encounter   Procedures    ECG 12 Lead      No orders of the defined types were placed in this encounter.        Thank you for the opportunity to participate in this patient's care.    NIURKA Bautista    This office note has been dictated.

## 2023-11-08 RX ORDER — METOPROLOL SUCCINATE 50 MG/1
50 TABLET, EXTENDED RELEASE ORAL DAILY
Qty: 90 TABLET | Refills: 3 | Status: SHIPPED | OUTPATIENT
Start: 2023-11-08

## 2023-11-30 ENCOUNTER — TELEPHONE (OUTPATIENT)
Dept: CARDIOLOGY | Facility: CLINIC | Age: 73
End: 2023-11-30
Payer: MEDICARE

## 2023-11-30 NOTE — TELEPHONE ENCOUNTER
Caller Name: BILL  Relationship: [employer]  Best Contact Number: [phone]    Patient is requesting samples of ELIQUIS  How many days of medication do you have left? ON HIS LAST BOX    Additional Information: 4 BOXES IF POSSIBLE

## 2023-12-07 RX ORDER — CLOPIDOGREL BISULFATE 75 MG/1
TABLET ORAL
Qty: 90 TABLET | Refills: 2 | Status: SHIPPED | OUTPATIENT
Start: 2023-12-07

## 2023-12-07 NOTE — TELEPHONE ENCOUNTER
Received a refill for pt's Plavix.  Pt is taking for PAD managed by Dr. Villatoro.  Would you like to continue prescribing or defer to Dr. Villatoro?  Please see pending rx.

## 2023-12-29 ENCOUNTER — TELEPHONE (OUTPATIENT)
Dept: CARDIOLOGY | Facility: CLINIC | Age: 73
End: 2023-12-29

## 2023-12-29 NOTE — TELEPHONE ENCOUNTER
Caller Name: Shantel Borja      Relationship: Emergency Contact      Best Contact Number:[phone] YOU CAN LEAVE VM      Patient is requesting samples of ELIQUIS 5 MG      How many days of medication do you have left? 3 DAYS        Additional Information: PLEASE CALL AND LET PT KNOW. PT LEFT MESSAGE ON ANSWERING MACHINE LAST WEEK

## 2024-01-03 ENCOUNTER — OFFICE VISIT (OUTPATIENT)
Dept: CARDIOLOGY | Facility: CLINIC | Age: 74
End: 2024-01-03
Payer: MEDICARE

## 2024-01-03 VITALS
HEART RATE: 70 BPM | SYSTOLIC BLOOD PRESSURE: 125 MMHG | WEIGHT: 211 LBS | DIASTOLIC BLOOD PRESSURE: 75 MMHG | BODY MASS INDEX: 28.58 KG/M2 | HEIGHT: 72 IN

## 2024-01-03 DIAGNOSIS — I45.5 SINUS PAUSE: ICD-10-CM

## 2024-01-03 DIAGNOSIS — Z72.0 TOBACCO USE: ICD-10-CM

## 2024-01-03 DIAGNOSIS — I25.10 MILD CORONARY ARTERY DISEASE: ICD-10-CM

## 2024-01-03 DIAGNOSIS — I48.0 PAF (PAROXYSMAL ATRIAL FIBRILLATION): Primary | ICD-10-CM

## 2024-01-03 NOTE — PROGRESS NOTES
"      [facility] CARDIOLOGY  [address] [facility] [address]  [facility] [address]  Phone: [phone]  Fax: [phone]  Patient Name: Derik Borja  :1950  Age: 73 y.o.  Primary Cardiologist: Ani Kam MD  Encounter Provider:  NIURKA Mcfarland    History of Present Illness     Derik Borja is a 73 y.o.  male whose medical history includes hyperlipidemia, peripheral artery disease s/p common iliac stent and lower extremity ulcers, COPD, tobacco use.  He is followed in our office by Dr. Kam for paroxysmal atrial fibrillation, and PACs. I have reviewed the past medical records in preparation of today's visit.     Follow-up:  He is here for 3-month follow-up.  He continues to have dyspnea when going up steps but feels this is stable.  He also has occasional palpitations which are also stable.  He is now smoking about 10 cigarettes/day; he had some stress over the holidays but plans to try to reduce this again.  His wounds on his legs had healed and he continues to wear Thera wraps; there are a few openings on the right leg which he has started taking doxycycline for.  Blood pressure at home is similar to today's reading and heart rate is always less than 90.  He denies chest pain, orthopnea, syncope, or leg swelling.  He is taking his medications as prescribed.    Data Review     The following data was reviewed by NIURKA Mcfarland on 24:    Vital Signs:   /75   Pulse 70   Ht 182.9 cm (72\")   Wt 95.7 kg (211 lb)   BMI 28.62 kg/m²       Weight:  Wt Readings from Last 3 Encounters:   24 95.7 kg (211 lb)   10/30/23 97 kg (213 lb 12.8 oz)   23 95.8 kg (211 lb 1.6 oz)     Body mass index is 28.62 kg/m².    Below is a summary of pertinent cardiology findings:  He works as a .  He has been smoking for over 20 years.  2013 he had an echocardiogram which showed EF 55 to 60%, aortic valve calcification, and trace " mitral insufficiency.  Lexiscan stress nuclear perfusion study at that time showed a small amount of inferior wall ischemia.  He also had symptoms of claudication and testing showing moderate occlusive disease.  He was evaluated by Dr. Jean Brambila.  In March 2013 he had coronary angiogram which showed 30% diffuse LAD stenosis, a focal 70% large first diagonal stenosis, 30% mid left circumflex stenosis, and a 30% RCA stenosis; the left main coronary artery was short but patent and the RCA was dominant.  The abdominal aorta was smooth and had no significant stenosis.  The left renal artery was single with a 40 to 50% proximal stenosis and the right renal artery is not mentioned in the report.  The common iliac arteries had significant disease on the left with a focal 90% stenosis and a 30% stenosis of the right.  The right iliac artery was patent, the left internal iliac artery was occluded, the SFA was occluded along the left along its entire length and reconstituted with collaterals at the profunda.  The common femoral artery had moderate diffuse disease.  There was brisk three-vessel runoff to the foot, the right common femoral artery had mild disease, the right SFA was occluded for its entire length and reconstitute with brisk profunda collaterals and three-vessel runoff to the foot.  He did receive an 8.0 x 39 mm Omnilink Elite stent to the left common iliac artery.  The SFA occlusions were treated medically.  April 2019 stress nuclear perfusion study showed no evidence of ischemia.  March 2021 he had a 48-hour Holter monitor which showed 7% PACs, 36 runs of nonsustained atrial tachycardia with the longest being 43 beats, and rare PVCs.  June 2021 he had a wound on his left leg due to trauma, and in October 2021 he also had a wound on his right leg due to trauma.  He follows with Dr. Ean Villatoro for significant venous incompetence with venous ulcers.  He has had surgery with Dr. Villatoro and also gets Unna boot therapy.   He is also on chronic doxycycline.  April 2022 echocardiogram showed EF 64% and grade 1 LV diastolic dysfunction.  July 2023 Zio patch monitor worn for 6 days showed atrial fibrillation with for 3 to 4-second pauses, 4.4% PVCs, short runs of SVT, and the average heart rate 79 bpm.  All pauses occurred when the patient converted from A-fib to sinus rhythm.  Echocardiogram showed normal diastolic function, EF 62%, and no significant valvular disease.  He was started on apixaban.    Labs:  08/03/2023:  cr 0.7, K 4.0, otherwise unremarkable CMP, Hgb 13.4, Plt 154  01/04/24 no updated labs to review      ECG 12 Lead    Date/Time: 1/3/2024 9:32 AM  Performed by: Theresa Butterfield APRN    Authorized by: Theresa Butterfield APRN  Comparison: compared with previous ECG from 10/30/2023  Similar to previous ECG  Rhythm: sinus rhythm  Rate: normal  BPM: 72          Medications     Allergies as of 01/03/2024 - Reviewed 01/03/2024   Allergen Reaction Noted    Penicillins Swelling 11/20/2012    Iodine Itching 03/03/2016       Current Outpatient Medications   Medication Instructions    apixaban (ELIQUIS) 5 mg, Oral, 2 Times Daily    Ascorbic Acid (VITAMIN C PO) 1,000 mg, Oral, Daily    atorvastatin (LIPITOR) 80 mg, Oral, Nightly    cholecalciferol (VITAMIN D3) 5,000 Units, Oral, Daily    clopidogrel (PLAVIX) 75 MG tablet Take 1 tablet by mouth once daily    cyclobenzaprine (FLEXERIL) 10 mg, Oral, Nightly    Docusate Sodium (COLACE PO) 1 tablet, Oral, Nightly    doxycycline (VIBRAMYCIN) 100 mg, Oral, 2 Times Daily    fluticasone (FLONASE) 50 MCG/ACT nasal spray 2 sprays, Nasal, Daily    isosorbide mononitrate (IMDUR) 30 MG 24 hr tablet Take 2 tablets by mouth once daily    Multiple Vitamin (MULTIVITAMIN) tablet 1 tablet, Oral, Daily    nitroglycerin (NITROSTAT) 0.4 MG SL tablet 1 under the tongue as needed for angina, may repeat q5mins for up three doses    Sotalol HCl AF 40 mg, Oral, 2 Times Daily    traMADol  (ULTRAM) 50 MG tablet 1 tablet, Oral, Every 8 Hours PRN        Past History, Review of Systems, Exam     Past Medical History:   Diagnosis Date    APC (atrial premature contractions) 07/21/2023    CAD (coronary artery disease)     Hyperlipidemia     PAD (peripheral artery disease)     PAF (paroxysmal atrial fibrillation) 07/2023    Pneumonia 05/20/2019    PVC (premature ventricular contraction)     Sinus pause 07/21/2023       Past Surgical History:   has a past surgical history that includes Iliac artery stent (03/2013) and Shoulder surgery (2013).     Social History     Socioeconomic History    Marital status:    Tobacco Use    Smoking status: Every Day     Packs/day: 0.50     Years: 35.00     Additional pack years: 0.00     Total pack years: 17.50     Types: Cigarettes     Passive exposure: Current    Smokeless tobacco: Never    Tobacco comments:     caffeine use - 3 cups coffee daily   Vaping Use    Vaping Use: Never used   Substance and Sexual Activity    Alcohol use: Yes     Comment: once a month - rarely    Drug use: Yes     Types: Marijuana       Review of Systems   Cardiovascular:  Positive for claudication, dyspnea on exertion and palpitations. Negative for chest pain, cyanosis, irregular heartbeat, leg swelling, near-syncope, orthopnea, paroxysmal nocturnal dyspnea and syncope.   Skin:  Positive for poor wound healing.       Vitals reviewed.   Constitutional:       Appearance: Not in distress.   Eyes:      Conjunctiva/sclera: Conjunctivae normal.      Pupils: Pupils are equal, round, and reactive to light.   HENT:      Head: Normocephalic.      Nose: Nose normal.    Mouth/Throat:      Pharynx: Oropharynx is clear.   Neck:      Vascular: JVD normal.   Pulmonary:      Effort: Pulmonary effort is normal.      Breath sounds: Normal breath sounds. No wheezing. No rhonchi. No rales.   Cardiovascular:      Normal rate. Regular rhythm. Normal S1. Normal S2.       Murmurs: There is no murmur.      Comments:  Bilateral leg wraps  Pulses:     Intact distal pulses.   Edema:     Peripheral edema absent.   Abdominal:      General: Bowel sounds are normal. There is no distension.      Palpations: Abdomen is soft.      Tenderness: There is no abdominal tenderness.   Musculoskeletal: Normal range of motion.      Cervical back: Normal range of motion and neck supple. Skin:     General: Skin is warm and dry.   Neurological:      Mental Status: Alert and oriented to person, place and time.   Psychiatric:         Attention and Perception: Attention normal.         Mood and Affect: Mood normal.         Speech: Speech normal.         Behavior: Behavior is cooperative.          Assessment and Plan     Assessment:  1. PAF (paroxysmal atrial fibrillation)    2. Sinus pause    3. Mild coronary artery disease    4. Tobacco use           Paroxysmal atrial fibrillation: This was noted in July 2023.  He did have conversion pauses when switching from A-fib to sinus rhythm.  He is maintaining sinus rhythm on 40 mg sotalol twice daily.  His [id number] score is 2 and he is anticoagulated with 5 mg apixaban twice daily.  He is maintaining sinus rhythm today.  Paroxysmal SVT: He was noted to have episodes of nonsustained SVT on March 2021 Holter monitor.  There was then concern he had A-fib in July 2023 monitor showed evidence of atrial fibrillation with average heart rate 79 bpm but 3 to 4-second pauses when he would convert from A-fib to sinus rhythm.  Metoprolol has been stopped; no recent issues with tachycardia.  PVCs: July 2023 Zio patch monitor showed PVC burden to be 4.4%.  Palpitations controlled on sotalol; metoprolol has been stopped.  PACs: Previously noted to have atrial tachycardia after he would have a PAC.  He is now having issues with paroxysmal atrial fibrillation but this is better on sotalol.  No recent issues off metoprolol.  Sinus pause: He was having conversion pauses.  It sounds like he is having fewer episodes of A-fib  on sotalol.  No recent issues off metoprolol.  Mild coronary artery disease: He had coronary angiography in March 2023 showing nonobstructive disease of the LAD, left circumflex, and RCA; there was a 70% focal lesion of a large first diagonal branch.  He did have a nuclear stress test in April 2019 showing no evidence of ischemia.  His medical therapy includes Plavix, isosorbide, apixaban, and atorvastatin.  He occasionally uses nitroglycerin for chest pain but this is rare.  Peripheral artery disease.  He has a long history of peripheral artery disease with history of stent to the left common iliac artery and occluded bilateral SFA arteries.  He is treated by Dr. Ean Villatoro.  He also has severe venous insufficiency and was having leg ulcers which are now mostly healed; he still nursing a few areas on the right leg..  He is on atorvastatin and Plavix.  Hyperlipidemia: No recent lipids to review; he is treated with 80 mg atorvastatin daily.  He states these have been checked by Dr. Oliveira  Tobacco use: He smoked for 20+ years but is now now back up to 10 cigarettes/day; he is going to try to reduce this further.    Mr. Borja is a patient of Dr. Kam's with history of nonobstructive coronary artery disease and more recently paroxysmal atrial fibrillation with conversion pauses.  It sounds like he is having fewer episodes of A-fib on 40 mg sotalol twice daily.  His blood pressure off metoprolol.  He is anticoagulated with 5 mg apixaban twice daily.  He is doing well and I will make no medication changes today.  We did discuss smoking cessation and he is really going to try.  I am going to cancel his March appointment and have him see Dr. Kam in 6 months.    Return for Cancel March appt; 6 months with Dr. Kam.  Orders Placed This Encounter   Procedures    ECG 12 Lead    SCANNED EKG      No orders of the defined types were placed in this encounter.        Thank you for the opportunity to participate in  this patient's care.    NIURKA Bautista    This office note has been dictated.

## 2024-02-15 ENCOUNTER — TELEPHONE (OUTPATIENT)
Dept: CARDIOLOGY | Facility: CLINIC | Age: 74
End: 2024-02-15
Payer: MEDICARE

## 2024-03-27 RX ORDER — ISOSORBIDE MONONITRATE 30 MG/1
TABLET, EXTENDED RELEASE ORAL
Qty: 180 TABLET | Refills: 0 | Status: SHIPPED | OUTPATIENT
Start: 2024-03-27

## 2024-03-29 ENCOUNTER — TELEPHONE (OUTPATIENT)
Dept: CARDIOLOGY | Facility: CLINIC | Age: 74
End: 2024-03-29
Payer: MEDICARE

## 2024-03-29 NOTE — TELEPHONE ENCOUNTER
Patient is needing samples of Eliquis to  at the main office.     CB: [phone]    Thanks,  Woody

## 2024-04-08 RX ORDER — ATORVASTATIN CALCIUM 80 MG/1
80 TABLET, FILM COATED ORAL NIGHTLY
Qty: 90 TABLET | Refills: 0 | Status: SHIPPED | OUTPATIENT
Start: 2024-04-08

## 2024-04-26 PROBLEM — R20.0 NUMBNESS OF BOTH LOWER EXTREMITIES: Status: ACTIVE | Noted: 2024-04-26

## 2024-04-26 PROBLEM — I80.03: Status: ACTIVE | Noted: 2024-04-26

## 2024-04-26 PROBLEM — R60.0 BILATERAL EDEMA OF LOWER EXTREMITY: Status: ACTIVE | Noted: 2024-04-26

## 2024-04-26 PROBLEM — I73.9 PERIPHERAL VASCULAR DISEASE: Status: ACTIVE | Noted: 2024-04-26

## 2024-04-26 PROBLEM — Z04.9 CONDITION NOT FOUND: Status: ACTIVE | Noted: 2024-04-26

## 2024-04-26 PROBLEM — I87.2 VENOUS INSUFFICIENCY: Status: ACTIVE | Noted: 2024-04-26

## 2024-05-16 ENCOUNTER — OFFICE VISIT (OUTPATIENT)
Age: 74
End: 2024-05-16
Payer: MEDICARE

## 2024-05-16 ENCOUNTER — HOSPITAL ENCOUNTER (OUTPATIENT)
Facility: HOSPITAL | Age: 74
Discharge: HOME | End: 2024-05-16
Payer: MEDICARE

## 2024-05-16 VITALS
BODY MASS INDEX: 29.12 KG/M2 | SYSTOLIC BLOOD PRESSURE: 124 MMHG | HEIGHT: 72 IN | DIASTOLIC BLOOD PRESSURE: 72 MMHG | WEIGHT: 215 LBS

## 2024-05-16 DIAGNOSIS — I70.213 ATHEROSCLEROSIS OF NATIVE ARTERY OF BOTH LOWER EXTREMITIES WITH INTERMITTENT CLAUDICATION: ICD-10-CM

## 2024-05-16 DIAGNOSIS — I73.9 PERIPHERAL VASCULAR DISEASE, UNSPECIFIED: ICD-10-CM

## 2024-05-16 DIAGNOSIS — I83.003 VENOUS STASIS ULCER OF ANKLE LIMITED TO BREAKDOWN OF SKIN WITH VARICOSE VEINS, UNSPECIFIED LATERALITY: ICD-10-CM

## 2024-05-16 DIAGNOSIS — I73.9 PAD (PERIPHERAL ARTERY DISEASE): Primary | ICD-10-CM

## 2024-05-16 DIAGNOSIS — R60.0 BILATERAL EDEMA OF LOWER EXTREMITY: ICD-10-CM

## 2024-05-16 DIAGNOSIS — I87.2 VENOUS INSUFFICIENCY: ICD-10-CM

## 2024-05-16 DIAGNOSIS — L97.301 VENOUS STASIS ULCER OF ANKLE LIMITED TO BREAKDOWN OF SKIN WITH VARICOSE VEINS, UNSPECIFIED LATERALITY: ICD-10-CM

## 2024-05-16 PROBLEM — I70.219 ATHEROSCLEROSIS OF NATIVE ARTERY OF EXTREMITY WITH INTERMITTENT CLAUDICATION: Status: ACTIVE | Noted: 2024-05-16

## 2024-05-16 PROBLEM — L97.909 VENOUS STASIS ULCER: Status: ACTIVE | Noted: 2024-05-16

## 2024-05-16 PROBLEM — I83.009 VENOUS STASIS ULCER: Status: ACTIVE | Noted: 2024-05-16

## 2024-05-16 LAB
BH CV LOWER ARTERIAL LEFT ABI RATIO: 0.6
BH CV LOWER ARTERIAL LEFT DORSALIS PEDIS SYS MAX: 78
BH CV LOWER ARTERIAL LEFT POST TIBIAL SYS MAX: 78
BH CV LOWER ARTERIAL RIGHT ABI RATIO: 0.77
BH CV LOWER ARTERIAL RIGHT DORSALIS PEDIS SYS MAX: 82
BH CV LOWER ARTERIAL RIGHT POST TIBIAL SYS MAX: 100
UPPER ARTERIAL LEFT ARM BRACHIAL SYS MAX: NORMAL
UPPER ARTERIAL RIGHT ARM BRACHIAL SYS MAX: NORMAL

## 2024-05-16 PROCEDURE — 93922 UPR/L XTREMITY ART 2 LEVELS: CPT

## 2024-05-16 NOTE — PROGRESS NOTES
Patient Name: Derik Borja    MRN: [id number] Encounter Date: 05/16/2024      Consulting Service: Vascular Surgery    Referring Provider: No ref. provider found       CHIEF COMPLAINT:  Chief Complaint   Patient presents with    Peripheral Vascular Disease       Subjective    HPI: Derik Borja is a 74 y.o. male is being seen for evaluation/management of known combined venous insufficiency with stasis ulcerations that we have been able to heal over time but he has no new recurrent crusted scabbed area on his right ankle that does flake off and show a small ulcer underneath it then starts to heal.  He is doing a very good job with his compression and his arterial pressures seem to be holding their own with them within the FARNAZ on the right 7 7.  His left side 0.6.  He seems adequate for his compression level but will need to be watched along with his leg.  He is aware that if things worsen we may need to go back to an Unna boot for a while.    PAST MEDICAL HISTORY:   Past Medical History:   Diagnosis Date    Afib 07/06/2023    APC (atrial premature contractions) 07/21/2023    1.5% APC burden noted on Holter monitor 7/2023    Atherosclerosis of native arteries of the extremities with ulceration 03/31/2022    PVD-right calf    Atherosclerosis of native arteries of the extremities with ulceration     PVD-left calf    Atherosclerosis of native arteries of the extremities with ulceration 04/10/2023    other part of foot    Bunion of right foot 06/19/2023    CAD (coronary artery disease)     3/13    Cellulitis of left lower extremity 01/05/2023    Cellulitis of right lower extremity 01/05/2023    Foot pain, right 04/10/2023    Hyperlipidemia     PAD (peripheral artery disease)     PAF (paroxysmal atrial fibrillation) 07/2023    Pneumonia 05/20/2019    PVC (premature ventricular contraction)     Sinus pause 07/21/2023    For postconversion pauses noted after atrial fibrillation on Holter monitor 7/2023    Varicose veins of  bilateral lower extremities with other complications 03/31/2022    Varicose veins of left lower extremity with inflammation 07/06/2022    Venous insufficiency (chronic) (peripheral) 03/31/2022    Venous ulcer 03/31/2022      PAST SURGICAL HISTORY:   Past Surgical History:   Procedure Laterality Date    ILIAC ARTERY STENT  03/2013    PERIPHERAL ARTERIAL STENT GRAFT      about 9 years ago    SHOULDER SURGERY  2013      FAMILY HISTORY:   Family History   Problem Relation Age of Onset    Stroke Mother     Heart disease Mother     Emphysema Father     Heart disease Brother     Kidney cancer Brother       SOCIAL HISTORY:   Social History     Tobacco Use    Smoking status: Every Day     Current packs/day: 0.50     Average packs/day: 0.5 packs/day for 35.0 years (17.5 ttl pk-yrs)     Types: Cigarettes     Passive exposure: Current    Smokeless tobacco: Never    Tobacco comments:     caffeine use - 3 cups coffee daily   Vaping Use    Vaping status: Never Used   Substance Use Topics    Alcohol use: Yes     Comment: once a month - rarely    Drug use: Yes     Types: Marijuana      MEDICATIONS:   Current Outpatient Medications on File Prior to Visit   Medication Sig Dispense Refill    apixaban (ELIQUIS) 5 MG tablet tablet Take 1 tablet by mouth 2 (Two) Times a Day. 28 tablet 0    Ascorbic Acid (VITAMIN C PO) Take 1,000 mg by mouth Daily.      atorvastatin (LIPITOR) 40 MG tablet Take 1 tablet by mouth Daily.      atorvastatin (LIPITOR) 80 MG tablet TAKE 1 TABLET BY MOUTH ONCE DAILY AT NIGHT 90 tablet 0    Bioflavonoid Products (SANJAY-C PO) Take 1 tablet by mouth Daily. 1000-50 mg      cholecalciferol (VITAMIN D3) 25 MCG (1000 UT) tablet Take 5 tablets by mouth Daily.      clopidogrel (PLAVIX) 75 MG tablet Take 1 tablet by mouth once daily 90 tablet 2    cyclobenzaprine (FLEXERIL) 10 MG tablet Take 1 tablet by mouth Every Night.      Docusate Sodium (COLACE PO) Take 1 tablet by mouth Every Night.      doxycycline (VIBRAMYCIN) 100  "MG capsule Take 1 capsule by mouth 2 (Two) Times a Day. (Patient taking differently: Take 1 capsule by mouth 2 (Two) Times a Day. Cipro plus the Doxy when he starts to have signs of cellulitis) 20 capsule 2    fluticasone (FLONASE) 50 MCG/ACT nasal spray 2 sprays into the nostril(s) as directed by provider Daily.      isosorbide mononitrate (IMDUR) 30 MG 24 hr tablet Take 2 tablets by mouth once daily 180 tablet 0    meloxicam (MOBIC) 15 MG tablet Take 1 tablet by mouth Daily.      METOPROLOL SUCCINATE ER PO Take 1 tablet by mouth Daily. 75 mg      Multiple Vitamin (MULTIVITAMIN) tablet Take 1 tablet by mouth Daily.      nitroglycerin (NITROSTAT) 0.4 MG SL tablet 1 under the tongue as needed for angina, may repeat q5mins for up three doses 30 tablet 3    Sotalol HCl AF 80 MG tablet Take 0.5 tablets by mouth 2 (Two) Times a Day. 90 tablet 3    traMADol (ULTRAM) 50 MG tablet Take 1 tablet by mouth Every 8 (Eight) Hours As Needed.      vitamin D3 125 MCG (5000 UT) capsule capsule Take 1 capsule by mouth Daily.      Zinc 50 MG tablet Take 1 tablet by mouth Daily.       No current facility-administered medications on file prior to visit.       ALLERGIES: Penicillins and Iodine       Objective   Vitals:    05/16/24 0852   BP: 124/72   Weight: 97.5 kg (215 lb)   Height: 182.9 cm (72\")     Body mass index is 29.16 kg/m².  BMI is >= 25 and <30. (Overweight) The following options were offered after discussion;: Information on healthy weight added to patient's after visit summary.      PHYSICAL EXAM:   Physical Exam  Constitutional:       Appearance: Normal appearance. He is normal weight.   HENT:      Head: Normocephalic and atraumatic.      Nose: Nose normal.   Eyes:      Extraocular Movements: Extraocular movements intact.      Pupils: Pupils are equal, round, and reactive to light.   Cardiovascular:      Rate and Rhythm: Normal rate.      Pulses:           Carotid pulses are 2+ on the right side and 2+ on the left side.    "    Radial pulses are 2+ on the right side and 2+ on the left side.        Femoral pulses are 2+ on the right side and 2+ on the left side.       Popliteal pulses are 0 on the right side and 0 on the left side.        Dorsalis pedis pulses are detected w/ Doppler on the right side and detected w/ Doppler on the left side.        Posterior tibial pulses are detected w/ Doppler on the right side and detected w/ Doppler on the left side.      Heart sounds: Normal heart sounds.      Comments: Right femoral calf ulceration with scab  Pulmonary:      Effort: Pulmonary effort is normal.      Breath sounds: Normal breath sounds.   Abdominal:      General: Abdomen is flat. Bowel sounds are normal.      Palpations: Abdomen is soft.   Musculoskeletal:         General: Normal range of motion.      Cervical back: Normal range of motion and neck supple.      Right lower leg: No edema.      Left lower leg: No edema.   Skin:     General: Skin is warm and dry.   Neurological:      General: No focal deficit present.      Mental Status: He is alert and oriented to person, place, and time. Mental status is at baseline.   Psychiatric:         Mood and Affect: Mood normal.         Thought Content: Thought content normal.          Result Review   LABS:      Results Review:       I reviewed the patient's new clinical results.    The following radiologic or non-invasive studies have been reviewed by me: Lower extremity ABIs and prior studies also reviewed  No radiology results for the last 30 days.                ASSESSMENT/PLAN:   Diagnoses and all orders for this visit:    1. PAD (peripheral artery disease) (Primary)    2. Venous insufficiency    3. Bilateral edema of lower extremity    4. Venous stasis ulcer of ankle limited to breakdown of skin with varicose veins, unspecified laterality    5. Atherosclerosis of native artery of both lower extremities with intermittent claudication  -     Doppler Ankle Brachial Index Single Level CAR;  Future       74 y.o. male with peripheral vascular disease as well as combined venous insufficiency.  We are doing a balancing act between the 2 and Mr. Borja is doing a wonderful job with his compression.  He is using a combination of compression sleeves stockings and CircAid to try to control things.  I think the compression he is using is still safe with his current perfusion on the right leg especially but we will watch things very closely.  In the interim if he has worsening in the ulceration he is going to call and we will get him back in and consider our next step which will likely be Unna boot therapy.  If his ABIs were to drop closer to 0.5 we would have to consider intervention we will watch them closely with serial ankle pressures.  Overall I am pleased that he has not needed our services in quite a while.  As far as his claudication he is walking 3 to 400 yards at a time and really stops more because his hip started to hurt then his lower legs.      I discussed the plan with the patient who is agreeable to the plan of care at this point. Thank you for this consult.   Follow Up  Return in about 3 months (around 8/16/2024).    Sukh Villatoro MD   05/16/24

## 2024-05-23 ENCOUNTER — TELEPHONE (OUTPATIENT)
Dept: CARDIOLOGY | Facility: CLINIC | Age: 74
End: 2024-05-23
Payer: MEDICARE

## 2024-06-24 RX ORDER — ISOSORBIDE MONONITRATE 30 MG/1
TABLET, EXTENDED RELEASE ORAL
Qty: 180 TABLET | Refills: 0 | Status: SHIPPED | OUTPATIENT
Start: 2024-06-24

## 2024-07-02 RX ORDER — ATORVASTATIN CALCIUM 80 MG/1
80 TABLET, FILM COATED ORAL NIGHTLY
Qty: 90 TABLET | Refills: 3 | Status: SHIPPED | OUTPATIENT
Start: 2024-07-02

## 2024-07-02 NOTE — TELEPHONE ENCOUNTER
Failed protocol    NOV-08/08/24-RM  LOV-01/03/24-MM    Paroxysmal atrial fibrillation: This was noted in July 2023.  He did have conversion pauses when switching from A-fib to sinus rhythm.  He is maintaining sinus rhythm on 40 mg sotalol twice daily.  His [id number] score is 2 and he is anticoagulated with 5 mg apixaban twice daily.  He is maintaining sinus rhythm today.  Paroxysmal SVT: He was noted to have episodes of nonsustained SVT on March 2021 Holter monitor.  There was then concern he had A-fib in July 2023 monitor showed evidence of atrial fibrillation with average heart rate 79 bpm but 3 to 4-second pauses when he would convert from A-fib to sinus rhythm.  Metoprolol has been stopped; no recent issues with tachycardia.  PVCs: July 2023 Zio patch monitor showed PVC burden to be 4.4%.  Palpitations controlled on sotalol; metoprolol has been stopped.  PACs: Previously noted to have atrial tachycardia after he would have a PAC.  He is now having issues with paroxysmal atrial fibrillation but this is better on sotalol.  No recent issues off metoprolol.  Sinus pause: He was having conversion pauses.  It sounds like he is having fewer episodes of A-fib on sotalol.  No recent issues off metoprolol.  Mild coronary artery disease: He had coronary angiography in March 2023 showing nonobstructive disease of the LAD, left circumflex, and RCA; there was a 70% focal lesion of a large first diagonal branch.  He did have a nuclear stress test in April 2019 showing no evidence of ischemia.  His medical therapy includes Plavix, isosorbide, apixaban, and atorvastatin.  He occasionally uses nitroglycerin for chest pain but this is rare.  Peripheral artery disease.  He has a long history of peripheral artery disease with history of stent to the left common iliac artery and occluded bilateral SFA arteries.  He is treated by Dr. Ean Villatoro.  He also has severe venous insufficiency and was having leg ulcers which are now mostly  healed; he still nursing a few areas on the right leg..  He is on atorvastatin and Plavix.  Hyperlipidemia: No recent lipids to review; he is treated with 80 mg atorvastatin daily.  He states these have been checked by Dr. Oliveira  Tobacco use: He smoked for 20+ years but is now now back up to 10 cigarettes/day; he is going to try to reduce this further.     Mr. Borja is a patient of Dr. Kam's with history of nonobstructive coronary artery disease and more recently paroxysmal atrial fibrillation with conversion pauses.  It sounds like he is having fewer episodes of A-fib on 40 mg sotalol twice daily.  His blood pressure off metoprolol.  He is anticoagulated with 5 mg apixaban twice daily.  He is doing well and I will make no medication changes today.  We did discuss smoking cessation and he is really going to try.  I am going to cancel his March appointment and have him see Dr. Kam in 6 months.

## 2024-07-11 ENCOUNTER — TELEPHONE (OUTPATIENT)
Dept: CARDIOLOGY | Facility: CLINIC | Age: 74
End: 2024-07-11
Payer: MEDICARE

## 2024-07-11 NOTE — TELEPHONE ENCOUNTER
Pt's wife called asking for samples of Eliquis 5 mg for pt. Called pt to et him know that there is a box ready for     Pt verbally understood     LOT [id number]  EXP SP 2025  Total 14

## 2024-07-26 ENCOUNTER — TELEPHONE (OUTPATIENT)
Dept: CARDIOLOGY | Facility: CLINIC | Age: 74
End: 2024-07-26
Payer: MEDICARE

## 2024-07-26 ENCOUNTER — TELEPHONE (OUTPATIENT)
Age: 74
End: 2024-07-26
Payer: MEDICARE

## 2024-07-26 RX ORDER — DOXYCYCLINE HYCLATE 100 MG/1
CAPSULE ORAL
Qty: 20 CAPSULE | Refills: 0 | Status: SHIPPED | OUTPATIENT
Start: 2024-07-26

## 2024-07-26 NOTE — TELEPHONE ENCOUNTER
Discussed with Dr. Villatoro. Resent order for Doxy, however pt will need to be seen in the office for a follow up visit. Pt set up for next week per [facility] request.

## 2024-07-26 NOTE — TELEPHONE ENCOUNTER
Patient has sores an his left leg and also his left foot has been swollen.  They are requesting a refill of the doxycycline.  [facility] in [place].  His wife is also wondering if he needs to come into the office.

## 2024-07-26 NOTE — TELEPHONE ENCOUNTER
Caller Name: Shantel Borja      Relationship: Emergency Contact      Best Contact Number: [phone]      Patient is requesting samples of ELIQUIS      How many days of medication do you have left? NONE        Additional Information:PT'S WIFE STATES SHE RECIEVED A CALL FROM DR ESME ALANIZ STATING [facility] OFFICE HAD ELIQUIS SAMPLES AND PT WAS WONDERING IF THEY CAN PICK THOSE UP TODAY, UNABLE TO WT TO OFFICE. PLEASE CALL BACK ASAP AND RELAY WHEN PT CAN GET SAMPLES.

## 2024-07-31 ENCOUNTER — OFFICE VISIT (OUTPATIENT)
Age: 74
End: 2024-07-31
Payer: MEDICARE

## 2024-07-31 VITALS
SYSTOLIC BLOOD PRESSURE: 124 MMHG | BODY MASS INDEX: 29.12 KG/M2 | WEIGHT: 215 LBS | HEIGHT: 72 IN | DIASTOLIC BLOOD PRESSURE: 78 MMHG

## 2024-07-31 DIAGNOSIS — L97.202 VENOUS STASIS ULCER OF CALF WITH FAT LAYER EXPOSED, UNSPECIFIED LATERALITY, UNSPECIFIED WHETHER VARICOSE VEINS PRESENT: ICD-10-CM

## 2024-07-31 DIAGNOSIS — I87.2 VENOUS INSUFFICIENCY: ICD-10-CM

## 2024-07-31 DIAGNOSIS — I83.002 VENOUS STASIS ULCER OF CALF WITH FAT LAYER EXPOSED, UNSPECIFIED LATERALITY, UNSPECIFIED WHETHER VARICOSE VEINS PRESENT: ICD-10-CM

## 2024-07-31 DIAGNOSIS — I73.9 PAD (PERIPHERAL ARTERY DISEASE): ICD-10-CM

## 2024-07-31 DIAGNOSIS — L03.116 CELLULITIS OF LEFT LEG: ICD-10-CM

## 2024-07-31 DIAGNOSIS — I70.213 ATHEROSCLEROSIS OF NATIVE ARTERY OF BOTH LOWER EXTREMITIES WITH INTERMITTENT CLAUDICATION: Primary | ICD-10-CM

## 2024-07-31 RX ORDER — BUPROPION HYDROCHLORIDE 150 MG/1
TABLET, EXTENDED RELEASE ORAL
COMMUNITY
Start: 2024-06-09

## 2024-07-31 NOTE — PROGRESS NOTES
Patient Name: Derik Borja    MRN: [id number] Encounter Date: 07/31/2024      Consulting Service: Vascular Surgery    Referring Provider: No ref. provider found       CHIEF COMPLAINT:  Chief Complaint   Patient presents with    Follow-up     Cellulitis.        Subjective    HPI: Derik Borja is a 74 y.o. male is being seen for evaluation/management of  recurrent left calf and ankle ulcerations.  Swelling became much worse and an area on his anterior shin split.  It is now become infected and we have started him on doxycycline appropriately and he is improving.  I think it safe to try an Unna boot today.  I do not have an answer for why his legs suddenly got worse when he been pretty stable organ to try to get the left leg venous scan to make sure no new clots or anything else is started.  He is on Eliquis and Plavix and it seems unlikely that he would have a new clot but we will see if anything is obvious from his venous insufficiency standpoint we will not pursue a full mapping as he is scheduled for that in September.    PAST MEDICAL HISTORY:   Past Medical History:   Diagnosis Date    Afib 07/06/2023    APC (atrial premature contractions) 07/21/2023    1.5% APC burden noted on Holter monitor 7/2023    Atherosclerosis of native arteries of the extremities with ulceration 03/31/2022    PVD-right calf    Atherosclerosis of native arteries of the extremities with ulceration     PVD-left calf    Atherosclerosis of native arteries of the extremities with ulceration 04/10/2023    other part of foot    Bunion of right foot 06/19/2023    CAD (coronary artery disease)     3/13    Cellulitis of left lower extremity 01/05/2023    Cellulitis of right lower extremity 01/05/2023    Foot pain, right 04/10/2023    Hyperlipidemia     PAD (peripheral artery disease)     PAF (paroxysmal atrial fibrillation) 07/2023    Pneumonia 05/20/2019    PVC (premature ventricular contraction)     Sinus pause 07/21/2023    For postconversion pauses  noted after atrial fibrillation on Holter monitor 7/2023    Varicose veins of bilateral lower extremities with other complications 03/31/2022    Varicose veins of left lower extremity with inflammation 07/06/2022    Venous insufficiency (chronic) (peripheral) 03/31/2022    Venous ulcer 03/31/2022      PAST SURGICAL HISTORY:   Past Surgical History:   Procedure Laterality Date    ILIAC ARTERY STENT  03/2013    PERIPHERAL ARTERIAL STENT GRAFT      about 9 years ago    SHOULDER SURGERY  2013      FAMILY HISTORY:   Family History   Problem Relation Age of Onset    Stroke Mother     Heart disease Mother     Emphysema Father     Heart disease Brother     Kidney cancer Brother       SOCIAL HISTORY:   Social History     Tobacco Use    Smoking status: Every Day     Current packs/day: 0.50     Average packs/day: 0.5 packs/day for 35.0 years (17.5 ttl pk-yrs)     Types: Cigarettes     Passive exposure: Current    Smokeless tobacco: Never    Tobacco comments:     caffeine use - 3 cups coffee daily   Vaping Use    Vaping status: Never Used   Substance Use Topics    Alcohol use: Yes     Comment: once a month - rarely    Drug use: Yes     Types: Marijuana      MEDICATIONS:   Current Outpatient Medications on File Prior to Visit   Medication Sig Dispense Refill    apixaban (ELIQUIS) 5 MG tablet tablet Take 1 tablet by mouth 2 (Two) Times a Day. 28 tablet 0    Ascorbic Acid (VITAMIN C PO) Take 1,000 mg by mouth Daily.      atorvastatin (LIPITOR) 80 MG tablet TAKE 1 TABLET BY MOUTH ONCE DAILY AT NIGHT 90 tablet 3    Bioflavonoid Products (SANJAY-C PO) Take 1 tablet by mouth Daily. 1000-50 mg      buPROPion SR (WELLBUTRIN SR) 150 MG 12 hr tablet       cholecalciferol (VITAMIN D3) 25 MCG (1000 UT) tablet Take 5 tablets by mouth Daily.      clopidogrel (PLAVIX) 75 MG tablet Take 1 tablet by mouth once daily 90 tablet 2    cyclobenzaprine (FLEXERIL) 10 MG tablet Take 1 tablet by mouth Every Night.      Docusate Sodium (COLACE PO) Take 1  "tablet by mouth Every Night.      doxycycline (VIBRAMYCIN) 100 MG capsule TAKE  CAPSULE BY MOUTH TWICE DAILY 20 capsule 0    fluticasone (FLONASE) 50 MCG/ACT nasal spray 2 sprays into the nostril(s) as directed by provider Daily.      isosorbide mononitrate (IMDUR) 30 MG 24 hr tablet Take 2 tablets by mouth once daily 180 tablet 0    meloxicam (MOBIC) 15 MG tablet Take 1 tablet by mouth Daily.      METOPROLOL SUCCINATE ER PO Take 1 tablet by mouth Daily. 75 mg      Multiple Vitamin (MULTIVITAMIN) tablet Take 1 tablet by mouth Daily.      nitroglycerin (NITROSTAT) 0.4 MG SL tablet 1 under the tongue as needed for angina, may repeat q5mins for up three doses 30 tablet 3    Sotalol HCl AF 80 MG tablet Take 0.5 tablets by mouth 2 (Two) Times a Day. 90 tablet 3    traMADol (ULTRAM) 50 MG tablet Take 1 tablet by mouth Every 8 (Eight) Hours As Needed.      vitamin D3 125 MCG (5000 UT) capsule capsule Take 1 capsule by mouth Daily.      Zinc 50 MG tablet Take 1 tablet by mouth Daily.       No current facility-administered medications on file prior to visit.       ALLERGIES: Penicillins and Iodine       Objective   Vitals:    07/31/24 0824   BP: 124/78   Weight: 97.5 kg (215 lb)   Height: 182.9 cm (72.01\")     Body mass index is 29.15 kg/m².          PHYSICAL EXAM:   Physical Exam     Result Review   LABS:                   Results Review:       I reviewed the patient's new clinical results.    The following radiologic or non-invasive studies have been reviewed by me: none  Doppler Ankle Brachial Index Single Level CAR 05/16/2024    Interpretation Summary    Right Conclusion: The right FARNAZ is moderately reduced.    Left Conclusion: The left FARNAZ is moderately reduced.     No radiology results for the last 30 days.                ASSESSMENT/PLAN:   Diagnoses and all orders for this visit:    1. Atherosclerosis of native artery of both lower extremities with intermittent claudication (Primary)    2. PAD (peripheral artery " disease)    3. Venous insufficiency  -     Duplex Venous Lower Extremity - Left CAR; Future    4. Cellulitis of left leg  -     Duplex Venous Lower Extremity - Left CAR; Future    5. Venous stasis ulcer of calf with fat layer exposed, unspecified laterality, unspecified whether varicose veins present       74 y.o. male with recurrent left leg ulcerations.  His right leg seems to be actually be stable.  The cellulitis in his left leg is starting to respond to the doxycycline he will stay on this and organ to start a new new boot.  He is aware to take it off if it starts to bother him over the weekend.  We will go with the next step of checking his vein scan and then we may have to set him up for arterial testing or further mapping which is already scheduled for September.  At this point in time I am hopeful that the doxycycline and Unna boot will be the trick to get him back to health on the left leg.    I discussed the plan with the patient who is agreeable to the plan of care at this point. Thank you for this consult.   Follow Up  Return in about 5 days (around 8/5/2024).    Sukh Villatoro MD   07/31/24

## 2024-08-05 ENCOUNTER — HOSPITAL ENCOUNTER (OUTPATIENT)
Facility: HOSPITAL | Age: 74
Discharge: HOME | End: 2024-08-05
Admitting: SURGERY
Payer: MEDICARE

## 2024-08-05 ENCOUNTER — OFFICE VISIT (OUTPATIENT)
Age: 74
End: 2024-08-05
Payer: MEDICARE

## 2024-08-05 VITALS
HEART RATE: 64 BPM | DIASTOLIC BLOOD PRESSURE: 87 MMHG | BODY MASS INDEX: 29.12 KG/M2 | SYSTOLIC BLOOD PRESSURE: 134 MMHG | WEIGHT: 215 LBS | HEIGHT: 72 IN

## 2024-08-05 DIAGNOSIS — L97.202 VENOUS STASIS ULCER OF CALF WITH FAT LAYER EXPOSED, UNSPECIFIED LATERALITY, UNSPECIFIED WHETHER VARICOSE VEINS PRESENT: Primary | ICD-10-CM

## 2024-08-05 DIAGNOSIS — L03.116 CELLULITIS OF LEFT LEG: ICD-10-CM

## 2024-08-05 DIAGNOSIS — I87.2 VENOUS INSUFFICIENCY: ICD-10-CM

## 2024-08-05 DIAGNOSIS — I83.002 VENOUS STASIS ULCER OF CALF WITH FAT LAYER EXPOSED, UNSPECIFIED LATERALITY, UNSPECIFIED WHETHER VARICOSE VEINS PRESENT: Primary | ICD-10-CM

## 2024-08-05 DIAGNOSIS — I73.9 PERIPHERAL VASCULAR DISEASE: ICD-10-CM

## 2024-08-05 LAB
BH CV LOW VAS LEFT EXTERNAL ILIAC AUGMENT: NORMAL
BH CV LOW VAS LEFT EXTERNAL ILIAC COMPRESS: NORMAL
BH CV LOW VAS LEFT GREATER SAPH AK VESSEL: 1
BH CV LOW VAS LEFT GREATER SAPH BK VESSEL: 1
BH CV LOW VAS LEFT LESSER SAPH VESSEL: 1
BH CV LOW VAS LEFT VARICOSITY AK VESSEL: 1
BH CV LOW VAS LEFT VARICOSITY BK VESSEL: 1
BH CV LOWER VASCULAR LEFT COMMON FEMORAL AUGMENT: NORMAL
BH CV LOWER VASCULAR LEFT COMMON FEMORAL COMPETENT: NORMAL
BH CV LOWER VASCULAR LEFT COMMON FEMORAL COMPRESS: NORMAL
BH CV LOWER VASCULAR LEFT COMMON FEMORAL PHASIC: NORMAL
BH CV LOWER VASCULAR LEFT COMMON FEMORAL SPONT: NORMAL
BH CV LOWER VASCULAR LEFT DISTAL FEMORAL AUGMENT: NORMAL
BH CV LOWER VASCULAR LEFT DISTAL FEMORAL COMPETENT: NORMAL
BH CV LOWER VASCULAR LEFT DISTAL FEMORAL COMPRESS: NORMAL
BH CV LOWER VASCULAR LEFT EXTERNAL ILIAC COMPETENT: NORMAL
BH CV LOWER VASCULAR LEFT EXTERNAL ILIAC PHASIC: NORMAL
BH CV LOWER VASCULAR LEFT EXTERNAL ILIAC SPONT: NORMAL
BH CV LOWER VASCULAR LEFT GASTRONEMIUS COMPRESS: NORMAL
BH CV LOWER VASCULAR LEFT GREATER SAPH AK COMPRESS: NORMAL
BH CV LOWER VASCULAR LEFT GREATER SAPH BK COMPRESS: NORMAL
BH CV LOWER VASCULAR LEFT LESSER SAPH COMPRESS: NORMAL
BH CV LOWER VASCULAR LEFT LESSER SAPH THROMBUS: NORMAL
BH CV LOWER VASCULAR LEFT MID FEMORAL AUGMENT: NORMAL
BH CV LOWER VASCULAR LEFT MID FEMORAL COMPETENT: NORMAL
BH CV LOWER VASCULAR LEFT MID FEMORAL COMPRESS: NORMAL
BH CV LOWER VASCULAR LEFT PERONEAL AUGMENT: NORMAL
BH CV LOWER VASCULAR LEFT PERONEAL COMPRESS: NORMAL
BH CV LOWER VASCULAR LEFT POPLITEAL AUGMENT: NORMAL
BH CV LOWER VASCULAR LEFT POPLITEAL COMPETENT: NORMAL
BH CV LOWER VASCULAR LEFT POPLITEAL COMPRESS: NORMAL
BH CV LOWER VASCULAR LEFT POSTERIOR TIBIAL AUGMENT: NORMAL
BH CV LOWER VASCULAR LEFT POSTERIOR TIBIAL COMPRESS: NORMAL
BH CV LOWER VASCULAR LEFT PROFUNDA FEMORAL AUGMENT: NORMAL
BH CV LOWER VASCULAR LEFT PROFUNDA FEMORAL COMPRESS: NORMAL
BH CV LOWER VASCULAR LEFT PROFUNDA FEMORAL PHASIC: NORMAL
BH CV LOWER VASCULAR LEFT PROFUNDA FEMORAL SPONT: NORMAL
BH CV LOWER VASCULAR LEFT PROXIMAL FEMORAL AUGMENT: NORMAL
BH CV LOWER VASCULAR LEFT PROXIMAL FEMORAL COMPETENT: NORMAL
BH CV LOWER VASCULAR LEFT PROXIMAL FEMORAL COMPRESS: NORMAL
BH CV LOWER VASCULAR LEFT PROXIMAL FEMORAL PHASIC: NORMAL
BH CV LOWER VASCULAR LEFT PROXIMAL FEMORAL SPONT: NORMAL
BH CV LOWER VASCULAR LEFT SAPHENOFEMORAL JUNCTION COMPRESS: NORMAL
BH CV LOWER VASCULAR LEFT SOLEAL COMPRESS: NORMAL
BH CV LOWER VASCULAR LEFT VARICOSITY AK COMPRESS: NORMAL
BH CV LOWER VASCULAR LEFT VARICOSITY BK COMPRESS: NORMAL
BH CV LOWER VASCULAR RIGHT COMMON FEMORAL AUGMENT: NORMAL
BH CV LOWER VASCULAR RIGHT COMMON FEMORAL COMPRESS: NORMAL
BH CV LOWER VASCULAR RIGHT COMMON FEMORAL PHASIC: NORMAL
BH CV LOWER VASCULAR RIGHT COMMON FEMORAL SPONT: NORMAL

## 2024-08-05 PROCEDURE — G2211 COMPLEX E/M VISIT ADD ON: HCPCS | Performed by: SURGERY

## 2024-08-05 PROCEDURE — 99214 OFFICE O/P EST MOD 30 MIN: CPT | Performed by: SURGERY

## 2024-08-05 PROCEDURE — 1160F RVW MEDS BY RX/DR IN RCRD: CPT | Performed by: SURGERY

## 2024-08-05 PROCEDURE — 93971 EXTREMITY STUDY: CPT

## 2024-08-05 PROCEDURE — 1159F MED LIST DOCD IN RCRD: CPT | Performed by: SURGERY

## 2024-08-05 PROCEDURE — 93971 EXTREMITY STUDY: CPT | Performed by: SURGERY

## 2024-08-05 RX ORDER — DOXYCYCLINE HYCLATE 100 MG/1
100 CAPSULE ORAL 2 TIMES DAILY
Qty: 14 CAPSULE | Refills: 3 | Status: SHIPPED | OUTPATIENT
Start: 2024-08-05

## 2024-08-05 NOTE — PROGRESS NOTES
Patient Name: Derik Borja    MRN: [id number] Encounter Date: 08/05/2024      Consulting Service: Vascular Surgery    Referring Provider: No ref. provider found       CHIEF COMPLAINT:  Chief Complaint   Patient presents with    Atherosclerosis of native artery of both lower extremities        Subjective    HPI: Derik Borja is a 74 y.o. male is being seen for evaluation/management of follow up for vein scan of the the left lower extremity.   Patient has been compliant with medical grade  compression stocking use as well as elevation.   Despite best medical therapy symptoms persist.  At this point in time I discussed with the patient the options for intervention versus continued medical therapy.  Based on current anatomy and covered endovenous options I have recommended Varithena as an option we should consider if things do not improve.    While the patient was then I discussed in detail at length the procedure itself as well as the risk benefits and complications thereof.  Risks include but are not limited to bleeding, infection, nerve injury, skin injury, failure to clear the veins, failure to clear the pain, deep vein thrombosis and associated pulmonary emboli. For Varithena superficial phlebitis was discussed. Patient understands and will schedule at their convenience.      PAST MEDICAL HISTORY:   Past Medical History:   Diagnosis Date    Afib 07/06/2023    APC (atrial premature contractions) 07/21/2023    1.5% APC burden noted on Holter monitor 7/2023    Atherosclerosis of native arteries of the extremities with ulceration 03/31/2022    PVD-right calf    Atherosclerosis of native arteries of the extremities with ulceration     PVD-left calf    Atherosclerosis of native arteries of the extremities with ulceration 04/10/2023    other part of foot    Bunion of right foot 06/19/2023    CAD (coronary artery disease)     3/13    Cellulitis of left lower extremity 01/05/2023    Cellulitis of right lower extremity  01/05/2023    Foot pain, right 04/10/2023    Hyperlipidemia     PAD (peripheral artery disease)     PAF (paroxysmal atrial fibrillation) 07/2023    Pneumonia 05/20/2019    PVC (premature ventricular contraction)     Sinus pause 07/21/2023    For postconversion pauses noted after atrial fibrillation on Holter monitor 7/2023    Varicose veins of bilateral lower extremities with other complications 03/31/2022    Varicose veins of left lower extremity with inflammation 07/06/2022    Venous insufficiency (chronic) (peripheral) 03/31/2022    Venous ulcer 03/31/2022      PAST SURGICAL HISTORY:   Past Surgical History:   Procedure Laterality Date    ILIAC ARTERY STENT  03/2013    PERIPHERAL ARTERIAL STENT GRAFT      about 9 years ago    SHOULDER SURGERY  2013    VARICOSE VEIN SURGERY Left 05/02/2022    Left GSV Venaseal Ablation    VARICOSE VEIN SURGERY Left 08/01/2022    Left Tributary Vein Varithena ablation      FAMILY HISTORY:   Family History   Problem Relation Age of Onset    Stroke Mother     Heart disease Mother     Emphysema Father     Heart disease Brother     Kidney cancer Brother       SOCIAL HISTORY:   Social History     Tobacco Use    Smoking status: Every Day     Current packs/day: 0.50     Average packs/day: 0.5 packs/day for 35.0 years (17.5 ttl pk-yrs)     Types: Cigarettes     Passive exposure: Current    Smokeless tobacco: Never    Tobacco comments:     caffeine use - 3 cups coffee daily   Vaping Use    Vaping status: Never Used   Substance Use Topics    Alcohol use: Yes     Comment: once a month - rarely    Drug use: Yes     Types: Marijuana      MEDICATIONS:   Current Outpatient Medications on File Prior to Visit   Medication Sig Dispense Refill    apixaban (ELIQUIS) 5 MG tablet tablet Take 1 tablet by mouth 2 (Two) Times a Day. 28 tablet 0    Ascorbic Acid (VITAMIN C PO) Take 1,000 mg by mouth Daily.      atorvastatin (LIPITOR) 80 MG tablet TAKE 1 TABLET BY MOUTH ONCE DAILY AT NIGHT 90 tablet 3     "Bioflavonoid Products (SANJAY-C PO) Take 1 tablet by mouth Daily. 1000-50 mg      buPROPion SR (WELLBUTRIN SR) 150 MG 12 hr tablet       cholecalciferol (VITAMIN D3) 25 MCG (1000 UT) tablet Take 5 tablets by mouth Daily.      clopidogrel (PLAVIX) 75 MG tablet Take 1 tablet by mouth once daily 90 tablet 2    cyclobenzaprine (FLEXERIL) 10 MG tablet Take 1 tablet by mouth Every Night.      Docusate Sodium (COLACE PO) Take 1 tablet by mouth Every Night.      doxycycline (VIBRAMYCIN) 100 MG capsule TAKE  CAPSULE BY MOUTH TWICE DAILY 20 capsule 0    fluticasone (FLONASE) 50 MCG/ACT nasal spray 2 sprays into the nostril(s) as directed by provider Daily.      isosorbide mononitrate (IMDUR) 30 MG 24 hr tablet Take 2 tablets by mouth once daily 180 tablet 0    meloxicam (MOBIC) 15 MG tablet Take 1 tablet by mouth Daily.      METOPROLOL SUCCINATE ER PO Take 1 tablet by mouth Daily. 75 mg      Multiple Vitamin (MULTIVITAMIN) tablet Take 1 tablet by mouth Daily.      nitroglycerin (NITROSTAT) 0.4 MG SL tablet 1 under the tongue as needed for angina, may repeat q5mins for up three doses 30 tablet 3    Sotalol HCl AF 80 MG tablet Take 0.5 tablets by mouth 2 (Two) Times a Day. 90 tablet 3    traMADol (ULTRAM) 50 MG tablet Take 1 tablet by mouth Every 8 (Eight) Hours As Needed.      vitamin D3 125 MCG (5000 UT) capsule capsule Take 1 capsule by mouth Daily.      Zinc 50 MG tablet Take 1 tablet by mouth Daily.       No current facility-administered medications on file prior to visit.       ALLERGIES: Penicillins and Iodine       Objective   Vitals:    08/05/24 1255   BP: 134/87   Pulse: 64   Weight: 97.5 kg (215 lb)   Height: 182.9 cm (72.01\")     Body mass index is 29.15 kg/m².          PHYSICAL EXAM:   Physical Exam     Result Review   LABS:                   Results Review:       I reviewed the patient's new clinical results.    The following radiologic or non-invasive studies have been reviewed by me: Extremity venous scan " reviewed with images reviewed  Doppler Ankle Brachial Index Single Level CAR 05/16/2024    Interpretation Summary    Right Conclusion: The right FARNAZ is moderately reduced.    Left Conclusion: The left FARNAZ is moderately reduced.     No radiology results for the last 30 days.                ASSESSMENT/PLAN:   Diagnoses and all orders for this visit:    1. Venous stasis ulcer of calf with fat layer exposed, unspecified laterality, unspecified whether varicose veins present (Primary)    2. Peripheral vascular disease    3. Venous insufficiency    4. Cellulitis of left leg    Other orders  -     doxycycline (VIBRAMYCIN) 100 MG capsule; Take 1 capsule by mouth 2 (Two) Times a Day.  Dispense: 14 capsule; Refill: 3       74 y.o. male with cellulitis and recurrent stasis ulceration to the left ankle.  Things have improved with antibiotics and continued compression.  He is on doxycycline and I have given him a refill as needed.    Placed in Unna boot today and see how things go he has been in Unna boot over the last week.  He will go 1 week with new boot on as long as it does not bother him and the pain gets better we will see him in a week and hopefully transition him back to stockings.  I think he is a candidate for Varithena if we feel like this is something that is going to recur.  We discussed that briefly and we will discuss it further in a week.    I discussed the plan with the patient who is agreeable to the plan of care at this point. Thank you for this consult.   Follow Up  Return in about 1 week (around 8/12/2024).    Sukh Villatoro MD   08/05/24

## 2024-08-08 ENCOUNTER — OFFICE VISIT (OUTPATIENT)
Dept: CARDIOLOGY | Facility: CLINIC | Age: 74
End: 2024-08-08
Payer: MEDICARE

## 2024-08-08 VITALS
BODY MASS INDEX: 28.44 KG/M2 | SYSTOLIC BLOOD PRESSURE: 118 MMHG | DIASTOLIC BLOOD PRESSURE: 82 MMHG | WEIGHT: 210 LBS | HEART RATE: 60 BPM | HEIGHT: 72 IN

## 2024-08-08 DIAGNOSIS — I49.1 APC (ATRIAL PREMATURE CONTRACTIONS): ICD-10-CM

## 2024-08-08 DIAGNOSIS — I73.9 PAD (PERIPHERAL ARTERY DISEASE): ICD-10-CM

## 2024-08-08 DIAGNOSIS — I48.0 PAF (PAROXYSMAL ATRIAL FIBRILLATION): Primary | ICD-10-CM

## 2024-08-08 DIAGNOSIS — E78.2 MIXED HYPERLIPIDEMIA: ICD-10-CM

## 2024-08-08 DIAGNOSIS — I73.9 PERIPHERAL VASCULAR DISEASE, UNSPECIFIED: ICD-10-CM

## 2024-08-08 PROCEDURE — 1160F RVW MEDS BY RX/DR IN RCRD: CPT | Performed by: INTERNAL MEDICINE

## 2024-08-08 PROCEDURE — 1159F MED LIST DOCD IN RCRD: CPT | Performed by: INTERNAL MEDICINE

## 2024-08-08 NOTE — PROGRESS NOTES
Date of Office Visit: 2024  Encounter Provider: Ani Kam MD  Place of Service: [facility] CARDIOLOGY  Patient Name: Derik Borja  :1950      Patient ID:  Derik Borja is a 74 y.o. male is here for  followup for PAF.        History of Present Illness    He has a history of hypertension, PAD with common iliac stent-history of ulcers lower extremities, hyperlipidemia, history of premature atrial complexes, tobacco use, PAF and COPD.     With his complaints, he had a 2-D echocardiogram with Doppler  done at [facility] on 2013. This showed ejection fraction of  55-60%, aortic valve calcification, and trace mitral insufficiency. He had a Lexiscan and  stress nuclear perfusion study which showed a small amount of inferior ischemia. Because  of his symptoms of claudication and his positive testing showing moderate occlusive  Disease.  He saw Dr. Brambila in consultation. He saw him on 2013, and set  him for an angiogram which was done on 2013. He did the coronary angiogram which  showed 30% diffuse left anterior descending stenosis, focal 70% large first diagonal  stenosis, 30% mid circumflex stenosis, and 30% right coronary artery stenosis. The left  main coronary artery was short. The right coronary artery was dominant. His abdominal  aorta was smooth and no significant stenosis. The left renal artery was single with a  40-50% proximal stenosis. I do not see a comment here about the right renal artery. The  common iliac arteries had significant disease on the left. The left had a focal 90%  stenosis. The right common iliac had a 30% stenosis. The right internal iliac artery was  patent. The left internal iliac artery was occluded. The superficial femoral artery was  occluded on the left along its entire length and reconstituted with collaterals at the  profunda. The common femoral artery had moderate, diffuse disease. There was  brisk,  three-vessel runoff to the foot. The right common femoral artery had mild disease. The  right superficial femoral artery was occluded for its entire length and reconstituted with  brisk profunda collaterals, and there was three-vessel runoff to the foot. He then  underwent stenting of the left common iliac artery receiving an 8.0 mm x 39 mm Omnilink  Elite stent. His SFA occlusions have been treated medically.         He did have a lymph node removed in his neck in November 2012.   His wife Holley is a cousin to the Rogers.  He has been smoking a pack a day for over 20 years. He and his wife are both in the  process of trying to quit.      He was told 2013 that his claudication can only be fixed with femoral-popliteal bypass.       Stress nuclear perfusion study done 4/8/2019 shows no evidence of ischemia.  He had a sleep evaluation done in 2019 showing mild obstructive sleep apnea for which he decided not to treat.     He had a wound on his left leg was acquired due to trauma on 6/23/2021.  He does have arterial insufficiency of the leg.  The wound on his right leg was also due to trauma on 10/1/2021 and PAD.  He had a Holter monitor done for 48 hours on 3/11/2021 which showed 7% PACs with 36 runs of nonsustained atrial tachycardia, longest lasting 43 beats, rare PVCs were noted and no VT, no pauses or bradycardia.     Echo done 4/11/22 showed LVEF 64% with grade 1 diastolic dysfunction.      He has been seeing Dr. Villatoro for peripheral arterial disease and significant venous incompetence with venous ulcers.  He gets Unna boots twice a week and has had venous surgeries with Dr. Villatoro.  He still works full-time and doing  in [place].       Zio patch monitor done 7/6/2023, worn for 6 days shows atrial fibrillation with four 3-4-second pauses, 4.4% PVCs, short runs of SVT, average heart rate of 79 bpm.  All the pauses noted occurred when the patient converted from atrial fibrillation to sinus  rhythm.Echocardiogram done 7/28/2023 showed normal diastolic function, ejection fraction 62%, normal valvular structure and function.    Labs with PCP done 1/18/2024 showed total cholesterol 144, HDL 40, triglycerides 88, LDL 86, non-, normal CMP, normal CBC, normal PSA.  He saw Dr. Villatoro 8/5/2024.  He has been following him for his PAD and recurrent stasis ulcer left ankle with cellulitis.  He was placed in a Unna boot at the visit and he was looking into Varithena injections.    He has no chest pain or pressure.  He has no difficulty breathing.  He takes his medications as directed without difficulty.  His energy level is lower but stable.  He has an Unna boot on the left leg with swelling in the left foot and toes.  He denies heart racing or skipping, dizziness or syncope.    Past Medical History:   Diagnosis Date   • Afib 07/06/2023   • APC (atrial premature contractions) 07/21/2023    1.5% APC burden noted on Holter monitor 7/2023   • Atherosclerosis of native arteries of the extremities with ulceration 03/31/2022    PVD-right calf   • Atherosclerosis of native arteries of the extremities with ulceration     PVD-left calf   • Atherosclerosis of native arteries of the extremities with ulceration 04/10/2023    other part of foot   • Bunion of right foot 06/19/2023   • CAD (coronary artery disease)     3/13   • Cellulitis of left lower extremity 01/05/2023   • Cellulitis of right lower extremity 01/05/2023   • Foot pain, right 04/10/2023   • Hyperlipidemia    • PAD (peripheral artery disease)    • PAF (paroxysmal atrial fibrillation) 07/2023   • Pneumonia 05/20/2019   • PVC (premature ventricular contraction)    • Sinus pause 07/21/2023    For postconversion pauses noted after atrial fibrillation on Holter monitor 7/2023   • Varicose veins of bilateral lower extremities with other complications 03/31/2022   • Varicose veins of left lower extremity with inflammation 07/06/2022   • Venous insufficiency  (chronic) (peripheral) 03/31/2022   • Venous ulcer 03/31/2022         Past Surgical History:   Procedure Laterality Date   • ILIAC ARTERY STENT  03/2013   • PERIPHERAL ARTERIAL STENT GRAFT      about 9 years ago   • SHOULDER SURGERY  2013   • VARICOSE VEIN SURGERY Left 05/02/2022    Left GSV Venaseal Ablation   • VARICOSE VEIN SURGERY Left 08/01/2022    Left Tributary Vein Varithena ablation       Current Outpatient Medications on File Prior to Visit   Medication Sig Dispense Refill   • apixaban (ELIQUIS) 5 MG tablet tablet Take 1 tablet by mouth 2 (Two) Times a Day. 28 tablet 0   • Ascorbic Acid (VITAMIN C PO) Take 1,000 mg by mouth Daily.     • atorvastatin (LIPITOR) 80 MG tablet TAKE 1 TABLET BY MOUTH ONCE DAILY AT NIGHT 90 tablet 3   • Bioflavonoid Products (SANJAY-C PO) Take 1 tablet by mouth Daily. 1000-50 mg     • buPROPion SR (WELLBUTRIN SR) 150 MG 12 hr tablet      • clopidogrel (PLAVIX) 75 MG tablet Take 1 tablet by mouth once daily 90 tablet 2   • cyclobenzaprine (FLEXERIL) 10 MG tablet Take 1 tablet by mouth Every Night.     • Docusate Sodium (COLACE PO) Take 1 tablet by mouth Every Night.     • doxycycline (VIBRAMYCIN) 100 MG capsule Take 1 capsule by mouth 2 (Two) Times a Day. 14 capsule 3   • fluticasone (FLONASE) 50 MCG/ACT nasal spray 2 sprays into the nostril(s) as directed by provider Daily.     • isosorbide mononitrate (IMDUR) 30 MG 24 hr tablet Take 2 tablets by mouth once daily 180 tablet 0   • meloxicam (MOBIC) 15 MG tablet Take 1 tablet by mouth Daily.     • METOPROLOL SUCCINATE ER PO Take 1 tablet by mouth Daily. 75 mg     • Multiple Vitamin (MULTIVITAMIN) tablet Take 1 tablet by mouth Daily.     • nitroglycerin (NITROSTAT) 0.4 MG SL tablet 1 under the tongue as needed for angina, may repeat q5mins for up three doses 30 tablet 3   • Sotalol HCl AF 80 MG tablet Take 0.5 tablets by mouth 2 (Two) Times a Day. 90 tablet 3   • traMADol (ULTRAM) 50 MG tablet Take 1 tablet by mouth Every 8 (Eight)  "Hours As Needed.     • vitamin D3 125 MCG (5000 UT) capsule capsule Take 1 capsule by mouth Daily.     • Zinc 50 MG tablet Take 1 tablet by mouth Daily.     • cholecalciferol (VITAMIN D3) 25 MCG (1000 UT) tablet Take 5 tablets by mouth Daily. (Patient not taking: Reported on 8/8/2024)     • doxycycline (VIBRAMYCIN) 100 MG capsule TAKE  CAPSULE BY MOUTH TWICE DAILY (Patient not taking: Reported on 8/8/2024) 20 capsule 0     No current facility-administered medications on file prior to visit.       Social History     Socioeconomic History   • Marital status:    Tobacco Use   • Smoking status: Every Day     Current packs/day: 0.50     Average packs/day: 0.5 packs/day for 35.0 years (17.5 ttl pk-yrs)     Types: Cigarettes     Passive exposure: Current   • Smokeless tobacco: Never   • Tobacco comments:     caffeine use - 3 cups coffee daily   Vaping Use   • Vaping status: Never Used   Substance and Sexual Activity   • Alcohol use: Yes     Comment: once a month - rarely   • Drug use: Yes     Types: Marijuana             Procedures    ECG 12 Lead    Date/Time: 8/8/2024 9:52 AM  Performed by: Ani Kam MD    Authorized by: Ani Kam MD  Comparison: compared with previous ECG   Similar to previous ECG  Rhythm: sinus rhythm    Clinical impression: normal ECG          Objective:      Vitals:    08/08/24 0929   BP: 118/82   Pulse: 60   Weight: 95.3 kg (210 lb)   Height: 182.9 cm (72\")     Body mass index is 28.48 kg/m².    Vitals reviewed.   Constitutional:       General: Not in acute distress.     Appearance: Not diaphoretic.   Neck:      Vascular: No carotid bruit or JVD.   Pulmonary:      Effort: Pulmonary effort is normal.      Breath sounds: Normal breath sounds.   Cardiovascular:      Normal rate. Regular rhythm.      Murmurs: There is no murmur.      No gallop.  No rub.      Comments: Left ankle in unna boot  Pulses:     Intact distal pulses.      Carotid: 2+ bilaterally.     Radial: 2+ " bilaterally.     Dorsalis pedis: 2+ bilaterally.     Posterior tibial: 2+ bilaterally.  Edema:     Peripheral edema present.     Feet: 2+ edema of the left foot.  Neurological:      Cranial Nerves: No cranial nerve deficit.       Lab Review:       Assessment:      Diagnosis Plan   1. PAF (paroxysmal atrial fibrillation)        2. Peripheral vascular disease, unspecified        3. PAD (peripheral artery disease)        4. Mixed hyperlipidemia        5. APC (atrial premature contractions)            PAD, s/p left iliac stent.  Follows with Dr. Villatoro.   CAD with diagonal stenosis. On asa and plavix, isosorbide for angina.  His angina currently is stable.  Hyperlipidemia.  On atorvastatin.  Tobacco use. I advised him to stop smoking.   Varicose veins with chronic venous insufficiency and venous stasis ulcers in BLE, left leg being worse with chronic Unna boot use.  Follows with Dr. Villatoro.     PACs.  On metoprolol  MRSA in leg wound.  Has had bilateral leg wounds, the right leg is healed up, the left leg has a Unna boot.  He follows in wound care and with Dr. Irving FRIAS, on apixaban.     Plan:       No changes, see Catrina in 6 months, overall stable cardiac status, has not recently noticed atrial fibrillation.  No other testing at this time.

## 2024-08-12 ENCOUNTER — OFFICE VISIT (OUTPATIENT)
Age: 74
End: 2024-08-12
Payer: MEDICARE

## 2024-08-12 DIAGNOSIS — R60.0 BILATERAL EDEMA OF LOWER EXTREMITY: ICD-10-CM

## 2024-08-12 DIAGNOSIS — I83.003 VENOUS STASIS ULCER OF ANKLE WITH VARICOSE VEINS, UNSPECIFIED LATERALITY, UNSPECIFIED ULCER STAGE: ICD-10-CM

## 2024-08-12 DIAGNOSIS — I87.2 VENOUS INSUFFICIENCY: Primary | ICD-10-CM

## 2024-08-12 DIAGNOSIS — I80.03 SUPERFICIAL THROMBOPHLEBITIS OF BOTH LEGS: ICD-10-CM

## 2024-08-12 DIAGNOSIS — L97.309 VENOUS STASIS ULCER OF ANKLE WITH VARICOSE VEINS, UNSPECIFIED LATERALITY, UNSPECIFIED ULCER STAGE: ICD-10-CM

## 2024-08-12 PROCEDURE — 1160F RVW MEDS BY RX/DR IN RCRD: CPT | Performed by: SURGERY

## 2024-08-12 PROCEDURE — 29580 STRAPPING UNNA BOOT: CPT | Performed by: SURGERY

## 2024-08-12 PROCEDURE — 1159F MED LIST DOCD IN RCRD: CPT | Performed by: SURGERY

## 2024-08-12 PROCEDURE — 99213 OFFICE O/P EST LOW 20 MIN: CPT | Performed by: SURGERY

## 2024-08-12 NOTE — PROGRESS NOTES
Patient Name: Derik Borja    MRN: [id number] Encounter Date: 08/05/2024      Consulting Service: Vascular Surgery    Referring Provider: No ref. provider found       CHIEF COMPLAINT:  No chief complaint on file.      Subjective    HPI: Derik Borja is a 74 y.o. male is being seen for evaluation/management of follow up for vein scan of the the left lower extremity.   Patient has been compliant with medical grade  compression stocking use as well as elevation.   Despite best medical therapy symptoms persist.  At this point in time I discussed with the patient the options for intervention versus continued medical therapy.  Based on current anatomy and covered endovenous options I have recommended Varithena as an option we should consider if things do not improve.    While the patient was then I discussed in detail at length the procedure itself as well as the risk benefits and complications thereof.  Risks include but are not limited to bleeding, infection, nerve injury, skin injury, failure to clear the veins, failure to clear the pain, deep vein thrombosis and associated pulmonary emboli. For Varithena superficial phlebitis was discussed. Patient understands and will schedule at their convenience.      PAST MEDICAL HISTORY:   Past Medical History:   Diagnosis Date    Afib 07/06/2023    APC (atrial premature contractions) 07/21/2023    1.5% APC burden noted on Holter monitor 7/2023    Atherosclerosis of native arteries of the extremities with ulceration 03/31/2022    PVD-right calf    Atherosclerosis of native arteries of the extremities with ulceration     PVD-left calf    Atherosclerosis of native arteries of the extremities with ulceration 04/10/2023    other part of foot    Bunion of right foot 06/19/2023    CAD (coronary artery disease)     3/13    Cellulitis of left lower extremity 01/05/2023    Cellulitis of right lower extremity 01/05/2023    Foot pain, right 04/10/2023    Hyperlipidemia     PAD (peripheral  artery disease)     PAF (paroxysmal atrial fibrillation) 07/2023    Pneumonia 05/20/2019    PVC (premature ventricular contraction)     Sinus pause 07/21/2023    For postconversion pauses noted after atrial fibrillation on Holter monitor 7/2023    Varicose veins of bilateral lower extremities with other complications 03/31/2022    Varicose veins of left lower extremity with inflammation 07/06/2022    Venous insufficiency (chronic) (peripheral) 03/31/2022    Venous ulcer 03/31/2022      PAST SURGICAL HISTORY:   Past Surgical History:   Procedure Laterality Date    ILIAC ARTERY STENT  03/2013    PERIPHERAL ARTERIAL STENT GRAFT      about 9 years ago    SHOULDER SURGERY  2013    VARICOSE VEIN SURGERY Left 05/02/2022    Left GSV Venaseal Ablation    VARICOSE VEIN SURGERY Left 08/01/2022    Left Tributary Vein Varithena ablation      FAMILY HISTORY:   Family History   Problem Relation Age of Onset    Stroke Mother     Heart disease Mother     Emphysema Father     Heart disease Brother     Kidney cancer Brother       SOCIAL HISTORY:   Social History     Tobacco Use    Smoking status: Every Day     Current packs/day: 0.50     Average packs/day: 0.5 packs/day for 35.0 years (17.5 ttl pk-yrs)     Types: Cigarettes     Passive exposure: Current    Smokeless tobacco: Never    Tobacco comments:     caffeine use - 3 cups coffee daily   Vaping Use    Vaping status: Never Used   Substance Use Topics    Alcohol use: Yes     Comment: once a month - rarely    Drug use: Yes     Types: Marijuana      MEDICATIONS:   Current Outpatient Medications on File Prior to Visit   Medication Sig Dispense Refill    apixaban (ELIQUIS) 5 MG tablet tablet Take 1 tablet by mouth 2 (Two) Times a Day. 28 tablet 0    Ascorbic Acid (VITAMIN C PO) Take 1,000 mg by mouth Daily.      atorvastatin (LIPITOR) 80 MG tablet TAKE 1 TABLET BY MOUTH ONCE DAILY AT NIGHT 90 tablet 3    Bioflavonoid Products (SANJAY-C PO) Take 1 tablet by mouth Daily. 1000-50 mg       buPROPion SR (WELLBUTRIN SR) 150 MG 12 hr tablet       clopidogrel (PLAVIX) 75 MG tablet Take 1 tablet by mouth once daily 90 tablet 2    cyclobenzaprine (FLEXERIL) 10 MG tablet Take 1 tablet by mouth Every Night.      Docusate Sodium (COLACE PO) Take 1 tablet by mouth Every Night.      doxycycline (VIBRAMYCIN) 100 MG capsule Take 1 capsule by mouth 2 (Two) Times a Day. 14 capsule 3    fluticasone (FLONASE) 50 MCG/ACT nasal spray 2 sprays into the nostril(s) as directed by provider Daily.      isosorbide mononitrate (IMDUR) 30 MG 24 hr tablet Take 2 tablets by mouth once daily 180 tablet 0    meloxicam (MOBIC) 15 MG tablet Take 1 tablet by mouth Daily.      METOPROLOL SUCCINATE ER PO Take 1 tablet by mouth Daily. 75 mg      Multiple Vitamin (MULTIVITAMIN) tablet Take 1 tablet by mouth Daily.      nitroglycerin (NITROSTAT) 0.4 MG SL tablet 1 under the tongue as needed for angina, may repeat q5mins for up three doses 30 tablet 3    Sotalol HCl AF 80 MG tablet Take 0.5 tablets by mouth 2 (Two) Times a Day. 90 tablet 3    traMADol (ULTRAM) 50 MG tablet Take 1 tablet by mouth Every 8 (Eight) Hours As Needed.      vitamin D3 125 MCG (5000 UT) capsule capsule Take 1 capsule by mouth Daily.      Zinc 50 MG tablet Take 1 tablet by mouth Daily.       No current facility-administered medications on file prior to visit.       ALLERGIES: Penicillins and Iodine       Objective   There were no vitals filed for this visit.    There is no height or weight on file to calculate BMI.          PHYSICAL EXAM:   Physical Exam  Constitutional:       Appearance: Normal appearance.   HENT:      Head: Normocephalic and atraumatic.      Nose: Nose normal.   Eyes:      Extraocular Movements: Extraocular movements intact.      Pupils: Pupils are equal, round, and reactive to light.   Cardiovascular:      Rate and Rhythm: Normal rate.      Pulses: Normal pulses.      Heart sounds: Normal heart sounds.      Comments: Left medial ulcer improving  but .  Had some weeping earlier but is stable now  Pulmonary:      Effort: Pulmonary effort is normal.      Breath sounds: Normal breath sounds.   Abdominal:      General: Abdomen is flat. Bowel sounds are normal.      Palpations: Abdomen is soft.   Musculoskeletal:         General: Normal range of motion.      Cervical back: Normal range of motion and neck supple.      Right lower le+ Edema present.      Left lower le+ Edema present.   Skin:     General: Skin is warm and dry.   Neurological:      General: No focal deficit present.      Mental Status: He is alert and oriented to person, place, and time. Mental status is at baseline.   Psychiatric:         Mood and Affect: Mood normal.         Thought Content: Thought content normal.          Result Review   LABS:                   Results Review:       I reviewed the patient's new clinical results.    The following radiologic or non-invasive studies have been reviewed by me: Extremity venous scan reviewed with images reviewed  Doppler Ankle Brachial Index Single Level CAR 2024    Interpretation Summary    Right Conclusion: The right FARNAZ is moderately reduced.    Left Conclusion: The left FARNAZ is moderately reduced.     No radiology results for the last 30 days.                ASSESSMENT/PLAN:   Diagnoses and all orders for this visit:    1. Venous insufficiency (Primary)    2. Superficial thrombophlebitis of both legs    3. Venous stasis ulcer of ankle with varicose veins, unspecified laterality, unspecified ulcer stage  -     Bandage UNNA Boot With / ZINC With / O CALAMINE LF 4IN 10YD    4. Bilateral edema of lower extremity       74 y.o. male with cellulitis and recurrent stasis ulceration to the left ankle.  Things have improved with antibiotics and continued compression with Unna boot.  At this point in time he is definitely improving but had to refill his doxycycline 1 more time.  He will finish that tomorrow.  Will get a get him  another week of Unna boot therapy and depending on his response we will make a decision as we can transition back to compression socks or whether we need to consider the Varithena treatment.  He is aware and is agreeable.  Unna boot being placed today.    I discussed the plan with the patient who is agreeable to the plan of care at this point. Thank you for this consult.   Follow Up  Return in about 1 week (around 8/19/2024).    Sukh Villatoro MD   08/12/24

## 2024-08-16 ENCOUNTER — TELEPHONE (OUTPATIENT)
Age: 74
End: 2024-08-16
Payer: MEDICARE

## 2024-08-16 NOTE — TELEPHONE ENCOUNTER
Pt is calling to see if we have eliquis 5 MG samples at [place]?    His wife can pick it up on Monday.    PH#: [phone]

## 2024-08-19 ENCOUNTER — OFFICE VISIT (OUTPATIENT)
Age: 74
End: 2024-08-19
Payer: MEDICARE

## 2024-08-19 VITALS
HEIGHT: 72 IN | BODY MASS INDEX: 28.44 KG/M2 | HEART RATE: 72 BPM | DIASTOLIC BLOOD PRESSURE: 79 MMHG | WEIGHT: 210 LBS | SYSTOLIC BLOOD PRESSURE: 125 MMHG

## 2024-08-19 DIAGNOSIS — L97.302: ICD-10-CM

## 2024-08-19 DIAGNOSIS — I83.003: ICD-10-CM

## 2024-08-19 DIAGNOSIS — I87.2 VENOUS INSUFFICIENCY: Primary | ICD-10-CM

## 2024-08-19 PROCEDURE — 1160F RVW MEDS BY RX/DR IN RCRD: CPT | Performed by: SURGERY

## 2024-08-19 PROCEDURE — G2211 COMPLEX E/M VISIT ADD ON: HCPCS | Performed by: SURGERY

## 2024-08-19 PROCEDURE — 99213 OFFICE O/P EST LOW 20 MIN: CPT | Performed by: SURGERY

## 2024-08-19 PROCEDURE — 1159F MED LIST DOCD IN RCRD: CPT | Performed by: SURGERY

## 2024-08-19 NOTE — PROGRESS NOTES
Patient Name: Derik Borja    MRN: [id number] Encounter Date: 08/05/2024      Consulting Service: Vascular Surgery    Referring Provider: No ref. provider found       CHIEF COMPLAINT:  Chief Complaint   Patient presents with    Chronic Venous Insufficiency    Left leg unna boot change       Subjective    HPI: Derik Borja is a 74 y.o. male is being seen for evaluation/management of follow up for vein scan of the the left lower extremity.   Patient has been compliant with medical grade  compression stocking use as well as elevation.   Despite best medical therapy symptoms persist.  At this point in time I discussed with the patient the options for intervention versus continued medical therapy.  Based on current anatomy and covered endovenous options I have recommended Varithena as an option we should consider if things do not improve.    While the patient was then I discussed in detail at length the procedure itself as well as the risk benefits and complications thereof.  Risks include but are not limited to bleeding, infection, nerve injury, skin injury, failure to clear the veins, failure to clear the pain, deep vein thrombosis and associated pulmonary emboli. For Varithena superficial phlebitis was discussed. Patient understands and will schedule at their convenience.      Follow-up today for Unna boot change reveals that he had to take it off over the weekend due to it bunching up.  He still has ulcerations of both medial ankles.  Will place SilvaSorb to these and reUnna boot to the left side.  There is no evidence of current cellulitis persisting.    PAST MEDICAL HISTORY:   Past Medical History:   Diagnosis Date    Afib 07/06/2023    APC (atrial premature contractions) 07/21/2023    1.5% APC burden noted on Holter monitor 7/2023    Atherosclerosis of native arteries of the extremities with ulceration 03/31/2022    PVD-right calf    Atherosclerosis of native arteries of the extremities with ulceration     PVD-left  calf    Atherosclerosis of native arteries of the extremities with ulceration 04/10/2023    other part of foot    Bunion of right foot 06/19/2023    CAD (coronary artery disease)     3/13    Cellulitis of left lower extremity 01/05/2023    Cellulitis of right lower extremity 01/05/2023    Foot pain, right 04/10/2023    Hyperlipidemia     PAD (peripheral artery disease)     PAF (paroxysmal atrial fibrillation) 07/2023    Pneumonia 05/20/2019    PVC (premature ventricular contraction)     Sinus pause 07/21/2023    For postconversion pauses noted after atrial fibrillation on Holter monitor 7/2023    Varicose veins of bilateral lower extremities with other complications 03/31/2022    Varicose veins of left lower extremity with inflammation 07/06/2022    Venous insufficiency (chronic) (peripheral) 03/31/2022    Venous ulcer 03/31/2022      PAST SURGICAL HISTORY:   Past Surgical History:   Procedure Laterality Date    ILIAC ARTERY STENT  03/2013    PERIPHERAL ARTERIAL STENT GRAFT      about 9 years ago    SHOULDER SURGERY  2013    VARICOSE VEIN SURGERY Left 05/02/2022    Left GSV Venaseal Ablation    VARICOSE VEIN SURGERY Left 08/01/2022    Left Tributary Vein Varithena ablation      FAMILY HISTORY:   Family History   Problem Relation Age of Onset    Stroke Mother     Heart disease Mother     Emphysema Father     Heart disease Brother     Kidney cancer Brother       SOCIAL HISTORY:   Social History     Tobacco Use    Smoking status: Every Day     Current packs/day: 0.50     Average packs/day: 0.5 packs/day for 35.0 years (17.5 ttl pk-yrs)     Types: Cigarettes     Passive exposure: Current    Smokeless tobacco: Never    Tobacco comments:     caffeine use - 3 cups coffee daily   Vaping Use    Vaping status: Never Used   Substance Use Topics    Alcohol use: Yes     Comment: once a month - rarely    Drug use: Yes     Types: Marijuana      MEDICATIONS:   Current Outpatient Medications on File Prior to Visit   Medication Sig  "Dispense Refill    apixaban (ELIQUIS) 5 MG tablet tablet Take 1 tablet by mouth 2 (Two) Times a Day. 28 tablet 0    Ascorbic Acid (VITAMIN C PO) Take 1,000 mg by mouth Daily.      atorvastatin (LIPITOR) 80 MG tablet TAKE 1 TABLET BY MOUTH ONCE DAILY AT NIGHT 90 tablet 3    Bioflavonoid Products (SANJAY-C PO) Take 1 tablet by mouth Daily. 1000-50 mg      buPROPion SR (WELLBUTRIN SR) 150 MG 12 hr tablet       clopidogrel (PLAVIX) 75 MG tablet Take 1 tablet by mouth once daily 90 tablet 2    cyclobenzaprine (FLEXERIL) 10 MG tablet Take 1 tablet by mouth Every Night.      Docusate Sodium (COLACE PO) Take 1 tablet by mouth Every Night.      doxycycline (VIBRAMYCIN) 100 MG capsule Take 1 capsule by mouth 2 (Two) Times a Day. 14 capsule 3    fluticasone (FLONASE) 50 MCG/ACT nasal spray 2 sprays into the nostril(s) as directed by provider Daily.      isosorbide mononitrate (IMDUR) 30 MG 24 hr tablet Take 2 tablets by mouth once daily 180 tablet 0    meloxicam (MOBIC) 15 MG tablet Take 1 tablet by mouth Daily.      METOPROLOL SUCCINATE ER PO Take 1 tablet by mouth Daily. 75 mg      Multiple Vitamin (MULTIVITAMIN) tablet Take 1 tablet by mouth Daily.      nitroglycerin (NITROSTAT) 0.4 MG SL tablet 1 under the tongue as needed for angina, may repeat q5mins for up three doses 30 tablet 3    Sotalol HCl AF 80 MG tablet Take 0.5 tablets by mouth 2 (Two) Times a Day. 90 tablet 3    traMADol (ULTRAM) 50 MG tablet Take 1 tablet by mouth Every 8 (Eight) Hours As Needed.      vitamin D3 125 MCG (5000 UT) capsule capsule Take 1 capsule by mouth Daily.      Zinc 50 MG tablet Take 1 tablet by mouth Daily.       No current facility-administered medications on file prior to visit.       ALLERGIES: Penicillins and Iodine       Objective   Vitals:    08/19/24 0844   BP: 125/79   Pulse: 72   Weight: 95.3 kg (210 lb)   Height: 182.9 cm (72\")       Body mass index is 28.48 kg/m².          PHYSICAL EXAM:   Physical Exam  Constitutional:       " Appearance: Normal appearance.   HENT:      Head: Normocephalic and atraumatic.      Nose: Nose normal.   Eyes:      Extraocular Movements: Extraocular movements intact.      Pupils: Pupils are equal, round, and reactive to light.   Cardiovascular:      Rate and Rhythm: Normal rate.      Pulses: Normal pulses.      Heart sounds: Normal heart sounds.      Comments: Left medial ulcer improving but .  Had some weeping earlier but is stable now  Pulmonary:      Effort: Pulmonary effort is normal.      Breath sounds: Normal breath sounds.   Abdominal:      General: Abdomen is flat. Bowel sounds are normal.      Palpations: Abdomen is soft.   Musculoskeletal:         General: Normal range of motion.      Cervical back: Normal range of motion and neck supple.      Right lower le+ Edema present.      Left lower le+ Edema present.   Skin:     General: Skin is warm and dry.   Neurological:      General: No focal deficit present.      Mental Status: He is alert and oriented to person, place, and time. Mental status is at baseline.   Psychiatric:         Mood and Affect: Mood normal.         Thought Content: Thought content normal.     Ulcerations of the medial ankles clean with some improvement in healing.  No cellulitis.    Result Review   LABS:                   Results Review:       I reviewed the patient's new clinical results.    The following radiologic or non-invasive studies have been reviewed by me: Extremity venous scan reviewed with images reviewed  Doppler Ankle Brachial Index Single Level CAR 2024    Interpretation Summary    Right Conclusion: The right FARNAZ is moderately reduced.    Left Conclusion: The left FARNAZ is moderately reduced.     No radiology results for the last 30 days.                ASSESSMENT/PLAN:   Diagnoses and all orders for this visit:    1. Venous insufficiency (Primary)  -     Bandage UNNA Boot With / ZINC With / O CALAMINE LF 4IN 10YD    2. Venous stasis ulcer of  ankle with fat layer exposed, unspecified laterality, unspecified whether varicose veins present  -     Bandage UNNA Boot With / ZINC With / O CALAMINE LF 4IN 10YD       74 y.o. male with cellulitis and recurrent stasis ulceration to the left ankle.  Both ankles are actually involved but the left is worse.  He had to remove his Unna boot over the weekend but we will replace today.  Will continue SilvaSorb to the areas of the ankles.  At this juncture Varithena is going to be pursued to try to close these areas down.  We will see him in 1 week to recheck.  Unna boot is replaced today.  I discussed the plan with the patient who is agreeable to the plan of care at this point. Thank you for this consult.   Follow Up  Return in about 1 week (around 8/26/2024).    Sukh Villatoro MD   08/19/24

## 2024-08-26 ENCOUNTER — OFFICE VISIT (OUTPATIENT)
Age: 74
End: 2024-08-26
Payer: MEDICARE

## 2024-08-26 VITALS
DIASTOLIC BLOOD PRESSURE: 79 MMHG | WEIGHT: 210 LBS | HEIGHT: 72 IN | SYSTOLIC BLOOD PRESSURE: 130 MMHG | BODY MASS INDEX: 28.44 KG/M2 | HEART RATE: 90 BPM

## 2024-08-26 DIAGNOSIS — L03.119 CELLULITIS AND ABSCESS OF FOOT: ICD-10-CM

## 2024-08-26 DIAGNOSIS — I83.003 VENOUS STASIS ULCER OF ANKLE WITH VARICOSE VEINS, UNSPECIFIED LATERALITY, UNSPECIFIED ULCER STAGE: ICD-10-CM

## 2024-08-26 DIAGNOSIS — I49.3 PVCS (PREMATURE VENTRICULAR CONTRACTIONS): ICD-10-CM

## 2024-08-26 DIAGNOSIS — I87.8 VENOUS STASIS: ICD-10-CM

## 2024-08-26 DIAGNOSIS — L03.116 CELLULITIS OF LEFT LEG: ICD-10-CM

## 2024-08-26 DIAGNOSIS — I83.003 VENOUS STASIS ULCER OF ANKLE WITH VARICOSE VEINS, UNSPECIFIED LATERALITY, UNSPECIFIED ULCER STAGE: Primary | ICD-10-CM

## 2024-08-26 DIAGNOSIS — L97.309 VENOUS STASIS ULCER OF ANKLE WITH VARICOSE VEINS, UNSPECIFIED LATERALITY, UNSPECIFIED ULCER STAGE: Primary | ICD-10-CM

## 2024-08-26 DIAGNOSIS — L02.619 CELLULITIS AND ABSCESS OF FOOT: ICD-10-CM

## 2024-08-26 DIAGNOSIS — I87.2 VENOUS INSUFFICIENCY: Primary | ICD-10-CM

## 2024-08-26 DIAGNOSIS — I87.332 CHRONIC VENOUS HYPERTENSION WITH ULCER AND INFLAMMATION INVOLVING LEFT SIDE: ICD-10-CM

## 2024-08-26 DIAGNOSIS — L97.309 VENOUS STASIS ULCER OF ANKLE WITH VARICOSE VEINS, UNSPECIFIED LATERALITY, UNSPECIFIED ULCER STAGE: ICD-10-CM

## 2024-08-26 PROBLEM — I87.339 CHRONIC VENOUS HYPERTENSION WITH ULCER AND INFLAMMATION: Status: ACTIVE | Noted: 2024-08-26

## 2024-08-26 PROCEDURE — 87070 CULTURE OTHR SPECIMN AEROBIC: CPT | Performed by: SURGERY

## 2024-08-26 PROCEDURE — 87147 CULTURE TYPE IMMUNOLOGIC: CPT | Performed by: SURGERY

## 2024-08-26 PROCEDURE — 87205 SMEAR GRAM STAIN: CPT | Performed by: SURGERY

## 2024-08-26 NOTE — PROGRESS NOTES
Patient Name: Derik Borja    MRN: [id number] Encounter Date: 08/26/2024      Consulting Service: Vascular Surgery    Referring Provider: No ref. provider found       CHIEF COMPLAINT:  Chief Complaint   Patient presents with    Venous insufficiency       Subjective    HPI: Derik Borja is a 74 y.o. male is being seen for evaluation/management of  worsening cellulitis in the ankle and foot on the left.  Patient started having pain there and actually on the medial aspect of the right foot as well.  The ulcer at the medial left foot appears infected and cellulitis has been treated with doxycycline but is becoming more resistant so we recultured the wound today.  I also debrided the area at bedside today sharply and excisionally.    PAST MEDICAL HISTORY:   Past Medical History:   Diagnosis Date    Afib 07/06/2023    APC (atrial premature contractions) 07/21/2023    1.5% APC burden noted on Holter monitor 7/2023    Atherosclerosis of native arteries of the extremities with ulceration 03/31/2022    PVD-right calf    Atherosclerosis of native arteries of the extremities with ulceration     PVD-left calf    Atherosclerosis of native arteries of the extremities with ulceration 04/10/2023    other part of foot    Bunion of right foot 06/19/2023    CAD (coronary artery disease)     3/13    Cellulitis of left lower extremity 01/05/2023    Cellulitis of right lower extremity 01/05/2023    Foot pain, right 04/10/2023    Hyperlipidemia     PAD (peripheral artery disease)     PAF (paroxysmal atrial fibrillation) 07/2023    Pneumonia 05/20/2019    PVC (premature ventricular contraction)     Sinus pause 07/21/2023    For postconversion pauses noted after atrial fibrillation on Holter monitor 7/2023    Varicose veins of bilateral lower extremities with other complications 03/31/2022    Varicose veins of left lower extremity with inflammation 07/06/2022    Venous insufficiency (chronic) (peripheral) 03/31/2022    Venous ulcer 03/31/2022       PAST SURGICAL HISTORY:   Past Surgical History:   Procedure Laterality Date    ILIAC ARTERY STENT  03/2013    PERIPHERAL ARTERIAL STENT GRAFT      about 9 years ago    SHOULDER SURGERY  2013    VARICOSE VEIN SURGERY Left 05/02/2022    Left GSV Venaseal Ablation    VARICOSE VEIN SURGERY Left 08/01/2022    Left Tributary Vein Varithena ablation      FAMILY HISTORY:   Family History   Problem Relation Age of Onset    Stroke Mother     Heart disease Mother     Emphysema Father     Heart disease Brother     Kidney cancer Brother       SOCIAL HISTORY:   Social History     Tobacco Use    Smoking status: Every Day     Current packs/day: 0.50     Average packs/day: 0.5 packs/day for 35.0 years (17.5 ttl pk-yrs)     Types: Cigarettes     Passive exposure: Current    Smokeless tobacco: Never    Tobacco comments:     caffeine use - 3 cups coffee daily   Vaping Use    Vaping status: Never Used   Substance Use Topics    Alcohol use: Yes     Comment: once a month - rarely    Drug use: Yes     Types: Marijuana      MEDICATIONS:   Current Outpatient Medications on File Prior to Visit   Medication Sig Dispense Refill    apixaban (ELIQUIS) 5 MG tablet tablet Take 1 tablet by mouth 2 (Two) Times a Day. 28 tablet 0    Ascorbic Acid (VITAMIN C PO) Take 1,000 mg by mouth Daily.      atorvastatin (LIPITOR) 80 MG tablet TAKE 1 TABLET BY MOUTH ONCE DAILY AT NIGHT 90 tablet 3    Bioflavonoid Products (SANJAY-C PO) Take 1 tablet by mouth Daily. 1000-50 mg      buPROPion SR (WELLBUTRIN SR) 150 MG 12 hr tablet       clopidogrel (PLAVIX) 75 MG tablet Take 1 tablet by mouth once daily 90 tablet 2    cyclobenzaprine (FLEXERIL) 10 MG tablet Take 1 tablet by mouth Every Night.      Docusate Sodium (COLACE PO) Take 1 tablet by mouth Every Night.      doxycycline (VIBRAMYCIN) 100 MG capsule Take 1 capsule by mouth 2 (Two) Times a Day. 14 capsule 3    fluticasone (FLONASE) 50 MCG/ACT nasal spray 2 sprays into the nostril(s) as directed by provider  "Daily.      isosorbide mononitrate (IMDUR) 30 MG 24 hr tablet Take 2 tablets by mouth once daily 180 tablet 0    meloxicam (MOBIC) 15 MG tablet Take 1 tablet by mouth Daily.      METOPROLOL SUCCINATE ER PO Take 1 tablet by mouth Daily. 75 mg      Multiple Vitamin (MULTIVITAMIN) tablet Take 1 tablet by mouth Daily.      nitroglycerin (NITROSTAT) 0.4 MG SL tablet 1 under the tongue as needed for angina, may repeat q5mins for up three doses 30 tablet 3    Sotalol HCl AF 80 MG tablet Take 0.5 tablets by mouth 2 (Two) Times a Day. 90 tablet 3    traMADol (ULTRAM) 50 MG tablet Take 1 tablet by mouth Every 8 (Eight) Hours As Needed.      vitamin D3 125 MCG (5000 UT) capsule capsule Take 1 capsule by mouth Daily.      Zinc 50 MG tablet Take 1 tablet by mouth Daily.       No current facility-administered medications on file prior to visit.       ALLERGIES: Penicillins and Iodine       Objective   Vitals:    08/26/24 0842   BP: 130/79   Pulse: 90   Weight: 95.3 kg (210 lb)   Height: 182.9 cm (72\")     Body mass index is 28.48 kg/m².          PHYSICAL EXAM:   Physical Exam  Constitutional:       Appearance: Normal appearance. He is normal weight.   HENT:      Head: Normocephalic and atraumatic.      Nose: Nose normal.   Eyes:      Extraocular Movements: Extraocular movements intact.      Pupils: Pupils are equal, round, and reactive to light.   Cardiovascular:      Rate and Rhythm: Normal rate.      Pulses: Normal pulses.           Carotid pulses are 2+ on the right side and 2+ on the left side.       Radial pulses are 2+ on the right side and 2+ on the left side.        Femoral pulses are 2+ on the right side and 2+ on the left side.       Dorsalis pedis pulses are detected w/ Doppler on the right side and detected w/ Doppler on the left side.        Posterior tibial pulses are detected w/ Doppler on the right side and detected w/ Doppler on the left side.      Heart sounds: Normal heart sounds.      Comments: Bilateral " medial ankle ulcers with the left having some drainage.  Sharp excisional debridement of the area allowed removal of desquamated skin and exposure of the ulcer which really only measured 2 x 4 mm after debridement of the area.  Area debrided measured 2 x 2 cm.  Cultures were sent.  Pulmonary:      Effort: Pulmonary effort is normal.      Breath sounds: Normal breath sounds.   Abdominal:      General: Abdomen is flat. Bowel sounds are normal.      Palpations: Abdomen is soft.   Musculoskeletal:         General: Normal range of motion.      Cervical back: Normal range of motion and neck supple.      Right lower leg: Edema present.      Left lower leg: Edema present.   Skin:     General: Skin is warm and dry.   Neurological:      General: No focal deficit present.      Mental Status: He is alert and oriented to person, place, and time. Mental status is at baseline.   Psychiatric:         Mood and Affect: Mood normal.         Thought Content: Thought content normal.          Result Review   LABS:                   Results Review:       I reviewed the patient's new clinical results.    The following radiologic or non-invasive studies have been reviewed by me: None  Duplex Venous Lower Extremity - Left CAR 08/05/2024    Interpretation Summary    Successful greater saphenous ablation with partial closure of the short saphenous vein.  No DVT seen.  Perforating vein persistence at the site of ulceration with significant reflux and size greater than 0.3 mm.     No radiology results for the last 30 days.                ASSESSMENT/PLAN:   Diagnoses and all orders for this visit:    1. Venous insufficiency (Primary)    2. PVCs (premature ventricular contractions)    3. Venous stasis    4. Chronic venous hypertension with ulcer and inflammation involving left side    5. Cellulitis of left leg    6. Cellulitis and abscess of foot       74 y.o. male with worsening cellulitis requiring resumption of the doxycycline.  At this point  in time debridement of the ankle has been performed and the patient is planned to follow-up in 3 days.  We recommended continued antibiotic course and we are going to send a culture off today.  I think it is still safe to place Unna boot since he is coming back in 3 days.  Will review his cultures at that time as well.  He will continue his doxycycline.    I discussed the plan with the patient who is agreeable to the plan of care at this point. Thank you for this consult.   Follow Up  Return in about 3 days (around 8/29/2024).    Sukh Villatoro MD   08/26/24

## 2024-08-28 LAB
BACTERIA SPEC AEROBE CULT: ABNORMAL
GRAM STN SPEC: ABNORMAL

## 2024-08-29 ENCOUNTER — OFFICE VISIT (OUTPATIENT)
Age: 74
End: 2024-08-29
Payer: MEDICARE

## 2024-08-29 VITALS
HEART RATE: 84 BPM | DIASTOLIC BLOOD PRESSURE: 76 MMHG | BODY MASS INDEX: 28.44 KG/M2 | WEIGHT: 210 LBS | HEIGHT: 72 IN | SYSTOLIC BLOOD PRESSURE: 109 MMHG

## 2024-08-29 DIAGNOSIS — I73.9 PERIPHERAL VASCULAR DISEASE: ICD-10-CM

## 2024-08-29 DIAGNOSIS — I87.8 VENOUS STASIS: Primary | ICD-10-CM

## 2024-08-29 DIAGNOSIS — I87.332 CHRONIC VENOUS HYPERTENSION WITH ULCER AND INFLAMMATION INVOLVING LEFT SIDE: ICD-10-CM

## 2024-08-29 DIAGNOSIS — I87.2 VENOUS INSUFFICIENCY: ICD-10-CM

## 2024-08-29 RX ORDER — METOPROLOL SUCCINATE 50 MG/1
1 TABLET, EXTENDED RELEASE ORAL DAILY
COMMUNITY
Start: 2024-08-13

## 2024-08-29 NOTE — PROGRESS NOTES
Patient Name: Derik Borja    MRN: [id number] Encounter Date: 08/26/2024      Consulting Service: Vascular Surgery    Referring Provider: No ref. provider found       CHIEF COMPLAINT:  Chief Complaint   Patient presents with    Chronic Venous Insufficiency w/ Ulceration     Unna boot change       Subjective    HPI: Derik Borja is a 74 y.o. male is being seen for evaluation/management of  ulceration to both medial malleoli are ankles with a perforating vein in the area of the left side.  There are head of with antibiotics and some debridement last week but he will complete his course of antibiotics soon.  At this point I do think that considering a very limited run of Varithena in a single or multi vein use will probably be our best choice to try to control the  in the veins of the area which are fairly large at night and visualized.  I think we will go after them in the [facility] with the left leg Varithena.  He is aware of the issues risks and benefits and agrees to procedure    PAST MEDICAL HISTORY:   Past Medical History:   Diagnosis Date    Afib 07/06/2023    APC (atrial premature contractions) 07/21/2023    1.5% APC burden noted on Holter monitor 7/2023    Atherosclerosis of native arteries of the extremities with ulceration 03/31/2022    PVD-right calf    Atherosclerosis of native arteries of the extremities with ulceration     PVD-left calf    Atherosclerosis of native arteries of the extremities with ulceration 04/10/2023    other part of foot    Bunion of right foot 06/19/2023    CAD (coronary artery disease)     3/13    Cellulitis of left lower extremity 01/05/2023    Cellulitis of right lower extremity 01/05/2023    Foot pain, right 04/10/2023    Hyperlipidemia     PAD (peripheral artery disease)     PAF (paroxysmal atrial fibrillation) 07/2023    Pneumonia 05/20/2019    PVC (premature ventricular contraction)     Sinus pause 07/21/2023    For postconversion pauses noted after atrial fibrillation on  Holter monitor 7/2023    Varicose veins of bilateral lower extremities with other complications 03/31/2022    Varicose veins of left lower extremity with inflammation 07/06/2022    Venous insufficiency (chronic) (peripheral) 03/31/2022    Venous ulcer 03/31/2022      PAST SURGICAL HISTORY:   Past Surgical History:   Procedure Laterality Date    ILIAC ARTERY STENT  03/2013    PERIPHERAL ARTERIAL STENT GRAFT      about 9 years ago    SHOULDER SURGERY  2013    VARICOSE VEIN SURGERY Left 05/02/2022    Left GSV Venaseal Ablation    VARICOSE VEIN SURGERY Left 08/01/2022    Left Tributary Vein Varithena ablation      FAMILY HISTORY:   Family History   Problem Relation Age of Onset    Stroke Mother     Heart disease Mother     Emphysema Father     Heart disease Brother     Kidney cancer Brother       SOCIAL HISTORY:   Social History     Tobacco Use    Smoking status: Every Day     Current packs/day: 0.50     Average packs/day: 0.5 packs/day for 35.0 years (17.5 ttl pk-yrs)     Types: Cigarettes     Passive exposure: Current    Smokeless tobacco: Never    Tobacco comments:     caffeine use - 3 cups coffee daily     Pt smokes 5-6 cigarettes a day    Vaping Use    Vaping status: Never Used   Substance Use Topics    Alcohol use: Yes     Comment: once a month - rarely    Drug use: Yes     Types: Marijuana      MEDICATIONS:   Current Outpatient Medications on File Prior to Visit   Medication Sig Dispense Refill    apixaban (ELIQUIS) 5 MG tablet tablet Take 1 tablet by mouth 2 (Two) Times a Day. 28 tablet 0    Ascorbic Acid (VITAMIN C PO) Take 1,000 mg by mouth Daily.      atorvastatin (LIPITOR) 80 MG tablet TAKE 1 TABLET BY MOUTH ONCE DAILY AT NIGHT 90 tablet 3    Bioflavonoid Products (SANJAY-C PO) Take 1 tablet by mouth Daily. 1000-50 mg      buPROPion SR (WELLBUTRIN SR) 150 MG 12 hr tablet       clopidogrel (PLAVIX) 75 MG tablet Take 1 tablet by mouth once daily 90 tablet 2    cyclobenzaprine (FLEXERIL) 10 MG tablet Take 1  "tablet by mouth Every Night.      Docusate Sodium (COLACE PO) Take 1 tablet by mouth Every Night.      doxycycline (VIBRAMYCIN) 100 MG capsule Take 1 capsule by mouth 2 (Two) Times a Day. 14 capsule 3    fluticasone (FLONASE) 50 MCG/ACT nasal spray 2 sprays into the nostril(s) as directed by provider Daily.      isosorbide mononitrate (IMDUR) 30 MG 24 hr tablet Take 2 tablets by mouth once daily 180 tablet 0    meloxicam (MOBIC) 15 MG tablet Take 1 tablet by mouth Daily.      metoprolol succinate XL (TOPROL-XL) 50 MG 24 hr tablet Take 1 tablet by mouth Daily.      Multiple Vitamin (MULTIVITAMIN) tablet Take 1 tablet by mouth Daily.      nitroglycerin (NITROSTAT) 0.4 MG SL tablet 1 under the tongue as needed for angina, may repeat q5mins for up three doses 30 tablet 3    Sotalol HCl AF 80 MG tablet Take 0.5 tablets by mouth 2 (Two) Times a Day. 90 tablet 3    traMADol (ULTRAM) 50 MG tablet Take 1 tablet by mouth Every 8 (Eight) Hours As Needed.      vitamin D3 125 MCG (5000 UT) capsule capsule Take 1 capsule by mouth Daily.      Zinc 50 MG tablet Take 1 tablet by mouth Daily.      [DISCONTINUED] METOPROLOL SUCCINATE ER PO Take 1 tablet by mouth Daily. 75 mg       No current facility-administered medications on file prior to visit.       ALLERGIES: Penicillins and Iodine       Objective   Vitals:    08/29/24 0827   BP: 109/76   Pulse: 84   Weight: 95.3 kg (210 lb)   Height: 182.9 cm (72\")     Body mass index is 28.48 kg/m².          PHYSICAL EXAM:   Physical Exam  Constitutional:       Appearance: Normal appearance. He is normal weight.   HENT:      Head: Normocephalic and atraumatic.      Nose: Nose normal.   Eyes:      Extraocular Movements: Extraocular movements intact.      Pupils: Pupils are equal, round, and reactive to light.   Cardiovascular:      Rate and Rhythm: Normal rate.      Pulses: Normal pulses.           Carotid pulses are 2+ on the right side and 2+ on the left side.       Radial pulses are 2+ on " the right side and 2+ on the left side.        Femoral pulses are 2+ on the right side and 2+ on the left side.       Dorsalis pedis pulses are detected w/ Doppler on the right side and detected w/ Doppler on the left side.        Posterior tibial pulses are detected w/ Doppler on the right side and detected w/ Doppler on the left side.      Heart sounds: Normal heart sounds.      Comments: Bilateral medial ankle ulcers with the left having some drainage.  Sharp excisional debridement of the area allowed removal of desquamated skin and exposure of the ulcer which really only measured 2 x 4 mm after debridement of the area.  Area debrided measured 2 x 2 cm.  Cultures were sent.  Pulmonary:      Effort: Pulmonary effort is normal.      Breath sounds: Normal breath sounds.   Abdominal:      General: Abdomen is flat. Bowel sounds are normal.      Palpations: Abdomen is soft.   Musculoskeletal:         General: Normal range of motion.      Cervical back: Normal range of motion and neck supple.      Right lower leg: Edema present.      Left lower leg: Edema present.   Skin:     General: Skin is warm and dry.   Neurological:      General: No focal deficit present.      Mental Status: He is alert and oriented to person, place, and time. Mental status is at baseline.   Psychiatric:         Mood and Affect: Mood normal.         Thought Content: Thought content normal.          Result Review   LABS:                   Results Review:       I reviewed the patient's new clinical results.    The following radiologic or non-invasive studies have been reviewed by me: None  Duplex Venous Lower Extremity - Left CAR 08/05/2024    Interpretation Summary    Successful greater saphenous ablation with partial closure of the short saphenous vein.  No DVT seen.  Perforating vein persistence at the site of ulceration with significant reflux and size greater than 0.3 mm.     No radiology results for the last 30 days.                 ASSESSMENT/PLAN:   Diagnoses and all orders for this visit:    1. Venous stasis (Primary)  -     Chemical Venous Ablation Unilateral (Varithena); Future  -     Duplex Venous Lower Extremity - Left CAR; Future    2. Venous insufficiency  -     Chemical Venous Ablation Unilateral (Varithena); Future  -     Duplex Venous Lower Extremity - Left CAR; Future    3. Peripheral vascular disease    4. Chronic venous hypertension with ulcer and inflammation involving left side  -     Chemical Venous Ablation Unilateral (Varithena); Future  -     Duplex Venous Lower Extremity - Left CAR; Future       74 y.o. male with worsening cellulitis requiring resumption of the doxycycline.  He is almost on his course and things are better.  Will continue Unna boot for now we will see him again in a week.  Assuming things remain stable we will see him back in 1 week.  At this point we will schedule Varithena ablation at his convenience and I think we will feel the safely do it even around the area of the perforating vein.  Would just use small amounts judiciously.  Will see him back next Thursday and we will get him scheduled today.    I discussed the plan with the patient who is agreeable to the plan of care at this point.       Return in about 1 week (around 9/5/2024).    Sukh Villatoro MD   08/29/24

## 2024-08-30 RX ORDER — CLOPIDOGREL BISULFATE 75 MG/1
TABLET ORAL
Qty: 90 TABLET | Refills: 1 | Status: SHIPPED | OUTPATIENT
Start: 2024-08-30

## 2024-09-03 ENCOUNTER — TELEPHONE (OUTPATIENT)
Age: 74
End: 2024-09-03
Payer: MEDICARE

## 2024-09-04 NOTE — TELEPHONE ENCOUNTER
Spoke w/ pt of Eliquis 5 mg samples being placed in front for pick. Verbalized appreciation.    ANURAG Stephens

## 2024-09-05 ENCOUNTER — HOSPITAL ENCOUNTER (OUTPATIENT)
Facility: HOSPITAL | Age: 74
Discharge: HOME | End: 2024-09-05
Admitting: SURGERY
Payer: MEDICARE

## 2024-09-05 ENCOUNTER — OFFICE VISIT (OUTPATIENT)
Age: 74
End: 2024-09-05
Payer: MEDICARE

## 2024-09-05 VITALS
BODY MASS INDEX: 28.44 KG/M2 | HEIGHT: 72 IN | SYSTOLIC BLOOD PRESSURE: 128 MMHG | DIASTOLIC BLOOD PRESSURE: 84 MMHG | WEIGHT: 210 LBS

## 2024-09-05 DIAGNOSIS — I70.213 ATHEROSCLEROSIS OF NATIVE ARTERY OF BOTH LOWER EXTREMITIES WITH INTERMITTENT CLAUDICATION: ICD-10-CM

## 2024-09-05 DIAGNOSIS — I73.9 PAD (PERIPHERAL ARTERY DISEASE): Primary | ICD-10-CM

## 2024-09-05 DIAGNOSIS — I75.023 ATHEROEMBOLISM OF BILATERAL LOWER EXTREMITIES: ICD-10-CM

## 2024-09-05 PROCEDURE — 93922 UPR/L XTREMITY ART 2 LEVELS: CPT

## 2024-09-05 RX ORDER — DIPHENHYDRAMINE HCL 25 MG
50 TABLET ORAL
OUTPATIENT
Start: 2024-09-05 | End: 2024-09-05

## 2024-09-05 RX ORDER — DIPHENHYDRAMINE HYDROCHLORIDE 50 MG/ML
50 INJECTION INTRAMUSCULAR; INTRAVENOUS
OUTPATIENT
Start: 2024-09-05 | End: 2024-09-05

## 2024-09-05 RX ORDER — PREDNISONE 50 MG/1
50 TABLET ORAL TAKE AS DIRECTED
Qty: 3 TABLET | Refills: 0 | Status: SHIPPED | OUTPATIENT
Start: 2024-09-05

## 2024-09-05 NOTE — PROGRESS NOTES
Patient Name: Derik Borja    MRN: [id number] Encounter Date: 08/26/2024      Consulting Service: Vascular Surgery    Referring Provider: No ref. provider found       CHIEF COMPLAINT:  Chief Complaint   Patient presents with    Venous stasis       Subjective    HPI: Derik Borja is a 74 y.o. male is being seen for evaluation/management of  ulceration to both medial malleoli are ankles with a perforating vein in the area of the left side.  There are head of with antibiotics and some debridement last week but he will complete his course of antibiotics soon.  At this point I do think that considering a very limited run of Varithena in a single or multi vein use will probably be our best choice to try to control the  in the veins of the area which are fairly large at night and visualized.  I think we will go after them in the [facility] with the left leg Varithena.  He is aware of the issues risks and benefits and agrees to procedure    Studies today with ABIs shows that his right side is drifted down from 0.77-0.62.  His left is relatively stable 0.6-0.58 but based on these waveforms I think that evaluation for possible intervention of the arterial level may actually be our best answer as he still has both medial malleoli medial ankle ulcers that are just unable to be completely healed.  I think this may be combined arterial and venous disease and organ to pursue a CTA with 3D reconstruction.  Premedication will be ordered..    PAST MEDICAL HISTORY:   Past Medical History:   Diagnosis Date    Afib 07/06/2023    APC (atrial premature contractions) 07/21/2023    1.5% APC burden noted on Holter monitor 7/2023    Atherosclerosis of native arteries of the extremities with ulceration 03/31/2022    PVD-right calf    Atherosclerosis of native arteries of the extremities with ulceration     PVD-left calf    Atherosclerosis of native arteries of the extremities with ulceration 04/10/2023    other part of foot    Bunion of right  foot 06/19/2023    CAD (coronary artery disease)     3/13    Cellulitis of left lower extremity 01/05/2023    Cellulitis of right lower extremity 01/05/2023    Foot pain, right 04/10/2023    Hyperlipidemia     PAD (peripheral artery disease)     PAF (paroxysmal atrial fibrillation) 07/2023    Pneumonia 05/20/2019    PVC (premature ventricular contraction)     Sinus pause 07/21/2023    For postconversion pauses noted after atrial fibrillation on Holter monitor 7/2023    Varicose veins of bilateral lower extremities with other complications 03/31/2022    Varicose veins of left lower extremity with inflammation 07/06/2022    Venous insufficiency (chronic) (peripheral) 03/31/2022    Venous ulcer 03/31/2022      PAST SURGICAL HISTORY:   Past Surgical History:   Procedure Laterality Date    ILIAC ARTERY STENT  03/2013    PERIPHERAL ARTERIAL STENT GRAFT      about 9 years ago    SHOULDER SURGERY  2013    VARICOSE VEIN SURGERY Left 05/02/2022    Left GSV Venaseal Ablation    VARICOSE VEIN SURGERY Left 08/01/2022    Left Tributary Vein Varithena ablation      FAMILY HISTORY:   Family History   Problem Relation Age of Onset    Stroke Mother     Heart disease Mother     Emphysema Father     Heart disease Brother     Kidney cancer Brother       SOCIAL HISTORY:   Social History     Tobacco Use    Smoking status: Every Day     Current packs/day: 0.50     Average packs/day: 0.5 packs/day for 35.0 years (17.5 ttl pk-yrs)     Types: Cigarettes     Passive exposure: Current    Smokeless tobacco: Never    Tobacco comments:     caffeine use - 3 cups coffee daily     Pt smokes 5-6 cigarettes a day    Vaping Use    Vaping status: Never Used   Substance Use Topics    Alcohol use: Yes     Comment: once a month - rarely    Drug use: Yes     Types: Marijuana      MEDICATIONS:   Current Outpatient Medications on File Prior to Visit   Medication Sig Dispense Refill    apixaban (ELIQUIS) 5 MG tablet tablet Take 1 tablet by mouth 2 (Two) Times a  "Day. 28 tablet 0    Ascorbic Acid (VITAMIN C PO) Take 1,000 mg by mouth Daily.      atorvastatin (LIPITOR) 80 MG tablet TAKE 1 TABLET BY MOUTH ONCE DAILY AT NIGHT 90 tablet 3    Bioflavonoid Products (SANJAY-C PO) Take 1 tablet by mouth Daily. 1000-50 mg      buPROPion SR (WELLBUTRIN SR) 150 MG 12 hr tablet       clopidogrel (PLAVIX) 75 MG tablet Take 1 tablet by mouth once daily 90 tablet 1    cyclobenzaprine (FLEXERIL) 10 MG tablet Take 1 tablet by mouth Every Night.      Docusate Sodium (COLACE PO) Take 1 tablet by mouth Every Night.      doxycycline (VIBRAMYCIN) 100 MG capsule Take 1 capsule by mouth 2 (Two) Times a Day. 14 capsule 3    fluticasone (FLONASE) 50 MCG/ACT nasal spray 2 sprays into the nostril(s) as directed by provider Daily.      isosorbide mononitrate (IMDUR) 30 MG 24 hr tablet Take 2 tablets by mouth once daily 180 tablet 0    meloxicam (MOBIC) 15 MG tablet Take 1 tablet by mouth Daily.      metoprolol succinate XL (TOPROL-XL) 50 MG 24 hr tablet Take 1 tablet by mouth Daily.      Multiple Vitamin (MULTIVITAMIN) tablet Take 1 tablet by mouth Daily.      nitroglycerin (NITROSTAT) 0.4 MG SL tablet 1 under the tongue as needed for angina, may repeat q5mins for up three doses 30 tablet 3    Sotalol HCl AF 80 MG tablet Take 0.5 tablets by mouth 2 (Two) Times a Day. 90 tablet 3    traMADol (ULTRAM) 50 MG tablet Take 1 tablet by mouth Every 8 (Eight) Hours As Needed.      vitamin D3 125 MCG (5000 UT) capsule capsule Take 1 capsule by mouth Daily.      Zinc 50 MG tablet Take 1 tablet by mouth Daily.       No current facility-administered medications on file prior to visit.       ALLERGIES: Penicillins and Iodine       Objective   Vitals:    09/05/24 1242   BP: 128/84   Weight: 95.3 kg (210 lb)   Height: 182.9 cm (72\")     Body mass index is 28.48 kg/m².          PHYSICAL EXAM:   Physical Exam  Constitutional:       Appearance: Normal appearance. He is normal weight.   HENT:      Head: Normocephalic and " atraumatic.      Nose: Nose normal.   Eyes:      Extraocular Movements: Extraocular movements intact.      Pupils: Pupils are equal, round, and reactive to light.   Cardiovascular:      Rate and Rhythm: Normal rate.      Pulses: Normal pulses.           Carotid pulses are 2+ on the right side and 2+ on the left side.       Radial pulses are 2+ on the right side and 2+ on the left side.        Femoral pulses are 2+ on the right side and 2+ on the left side.       Dorsalis pedis pulses are detected w/ Doppler on the right side and detected w/ Doppler on the left side.        Posterior tibial pulses are detected w/ Doppler on the right side and detected w/ Doppler on the left side.      Heart sounds: Normal heart sounds.      Comments: Bilateral medial ankle ulcers with the left having some drainage.  Sharp excisional debridement of the area allowed removal of desquamated skin and exposure of the ulcer which really only measured 2 x 4 mm after debridement of the area.  Area debrided measured 2 x 2 cm.  Cultures were sent.  Pulmonary:      Effort: Pulmonary effort is normal.      Breath sounds: Normal breath sounds.   Abdominal:      General: Abdomen is flat. Bowel sounds are normal.      Palpations: Abdomen is soft.   Musculoskeletal:         General: Normal range of motion.      Cervical back: Normal range of motion and neck supple.      Right lower leg: Edema present.      Left lower leg: Edema present.   Skin:     General: Skin is warm and dry.   Neurological:      General: No focal deficit present.      Mental Status: He is alert and oriented to person, place, and time. Mental status is at baseline.   Psychiatric:         Mood and Affect: Mood normal.         Thought Content: Thought content normal.          Result Review   LABS:                   Results Review:       I reviewed the patient's new clinical results.    The following radiologic or non-invasive studies have been reviewed by me: None  Duplex Venous  Lower Extremity - Left CAR 08/05/2024    Interpretation Summary    Successful greater saphenous ablation with partial closure of the short saphenous vein.  No DVT seen.  Perforating vein persistence at the site of ulceration with significant reflux and size greater than 0.3 mm.     No radiology results for the last 30 days.                ASSESSMENT/PLAN:   Diagnoses and all orders for this visit:    1. PAD (peripheral artery disease) (Primary)  -     diphenhydrAMINE (BENADRYL) tablet 50 mg  -     diphenhydrAMINE (BENADRYL) injection 50 mg  -     diphenhydrAMINE (BENADRYL) injection 50 mg  -     CT Angio Abdominal Aorta Bilateral Iliofem Runoff With & Without Contrast; Future    2. Atheroembolism of bilateral lower extremities  -     CT Angio Abdominal Aorta Bilateral Iliofem Runoff With & Without Contrast; Future    Other orders  -     predniSONE (DELTASONE) 50 MG tablet; Take 1 tablet by mouth Take As Directed for 3 doses. Take 1 tablet (50mg) 13 Hours 7 Hours & 1 Hour Prior to Exam  Dispense: 3 tablet; Refill: 0  -     Verify time of home doses of pre-medication for contrast allergy; Standing       74 y.o. male with cellulitis is cleared and he is off his doxycycline.  His arterial testing seems significant enough that further intervention may be considered in the to get a CTA to see if there is something we could do to improve his prior perfusion.  He is buying his own Unna boots now has a court better form and I think we will be able to keep up with things on that standpoint.  Will order the CTA but we have to give him premedication and that seems to be a problem with the ordering system.  I discussed the plan with the patient who is agreeable to the plan of care at this point.       Return in about 1 week (around 9/12/2024).    Sukh Villatoro MD   09/05/24

## 2024-09-06 ENCOUNTER — TELEPHONE (OUTPATIENT)
Age: 74
End: 2024-09-06
Payer: MEDICARE

## 2024-09-06 NOTE — TELEPHONE ENCOUNTER
PATIENT IS HAVING A CTA AORTA WITH RO AT [facility] ON 10-3-24 AND NEEDS PREMEDICATION FOR A DYE ALLERGY.  CALLED IN PREDNISONE 50MG #3 AND BENADRYL 50MG #1 TO  [facility] IN [place] AND THE PATIETN WILL .

## 2024-09-09 LAB
BH CV LOWER ARTERIAL LEFT ABI RATIO: 0.58
BH CV LOWER ARTERIAL LEFT DORSALIS PEDIS SYS MAX: 72
BH CV LOWER ARTERIAL LEFT POST TIBIAL SYS MAX: 76
BH CV LOWER ARTERIAL RIGHT ABI RATIO: 0.62
BH CV LOWER ARTERIAL RIGHT DORSALIS PEDIS SYS MAX: 74
BH CV LOWER ARTERIAL RIGHT POST TIBIAL SYS MAX: 82
UPPER ARTERIAL LEFT ARM BRACHIAL SYS MAX: NORMAL
UPPER ARTERIAL RIGHT ARM BRACHIAL SYS MAX: NORMAL

## 2024-09-12 ENCOUNTER — OFFICE VISIT (OUTPATIENT)
Age: 74
End: 2024-09-12
Payer: MEDICARE

## 2024-09-12 VITALS
BODY MASS INDEX: 28.44 KG/M2 | DIASTOLIC BLOOD PRESSURE: 80 MMHG | HEIGHT: 72 IN | SYSTOLIC BLOOD PRESSURE: 133 MMHG | WEIGHT: 210 LBS | HEART RATE: 74 BPM

## 2024-09-12 DIAGNOSIS — I70.299 ATHEROSCLEROSIS OF ARTERY OF EXTREMITY WITH ULCERATION: ICD-10-CM

## 2024-09-12 DIAGNOSIS — L97.909 ATHEROSCLEROSIS OF ARTERY OF EXTREMITY WITH ULCERATION: ICD-10-CM

## 2024-09-12 DIAGNOSIS — I87.8 VENOUS STASIS: ICD-10-CM

## 2024-09-12 DIAGNOSIS — I70.213 ATHEROSCLEROSIS OF NATIVE ARTERY OF BOTH LOWER EXTREMITIES WITH INTERMITTENT CLAUDICATION: ICD-10-CM

## 2024-09-12 DIAGNOSIS — I87.333 CHRONIC VENOUS HYPERTENSION WITH ULCER AND INFLAMMATION INVOLVING BOTH SIDES: ICD-10-CM

## 2024-09-12 DIAGNOSIS — I87.2 VENOUS INSUFFICIENCY: Primary | ICD-10-CM

## 2024-09-12 PROCEDURE — 1159F MED LIST DOCD IN RCRD: CPT | Performed by: SURGERY

## 2024-09-12 PROCEDURE — G2211 COMPLEX E/M VISIT ADD ON: HCPCS | Performed by: SURGERY

## 2024-09-12 PROCEDURE — 1160F RVW MEDS BY RX/DR IN RCRD: CPT | Performed by: SURGERY

## 2024-09-12 PROCEDURE — 99214 OFFICE O/P EST MOD 30 MIN: CPT | Performed by: SURGERY

## 2024-09-16 RX ORDER — SOTALOL HYDROCHLORIDE 80 MG/1
40 TABLET ORAL 2 TIMES DAILY
Qty: 90 TABLET | Refills: 0 | Status: SHIPPED | OUTPATIENT
Start: 2024-09-16

## 2024-09-19 ENCOUNTER — OFFICE VISIT (OUTPATIENT)
Age: 74
End: 2024-09-19
Payer: MEDICARE

## 2024-09-19 VITALS
HEART RATE: 85 BPM | BODY MASS INDEX: 28.44 KG/M2 | HEIGHT: 72 IN | SYSTOLIC BLOOD PRESSURE: 105 MMHG | DIASTOLIC BLOOD PRESSURE: 69 MMHG | WEIGHT: 210 LBS

## 2024-09-19 DIAGNOSIS — I87.333 CHRONIC VENOUS HYPERTENSION WITH ULCER AND INFLAMMATION INVOLVING BOTH SIDES: ICD-10-CM

## 2024-09-19 DIAGNOSIS — I87.2 VENOUS INSUFFICIENCY: Primary | ICD-10-CM

## 2024-09-19 DIAGNOSIS — I87.8 VENOUS STASIS: ICD-10-CM

## 2024-09-19 DIAGNOSIS — L97.909 ATHEROSCLEROSIS OF ARTERY OF EXTREMITY WITH ULCERATION: ICD-10-CM

## 2024-09-19 DIAGNOSIS — I70.299 ATHEROSCLEROSIS OF ARTERY OF EXTREMITY WITH ULCERATION: ICD-10-CM

## 2024-09-19 DIAGNOSIS — I73.9 PAD (PERIPHERAL ARTERY DISEASE): ICD-10-CM

## 2024-09-19 PROCEDURE — 1159F MED LIST DOCD IN RCRD: CPT | Performed by: SURGERY

## 2024-09-19 PROCEDURE — 99213 OFFICE O/P EST LOW 20 MIN: CPT | Performed by: SURGERY

## 2024-09-19 PROCEDURE — 1160F RVW MEDS BY RX/DR IN RCRD: CPT | Performed by: SURGERY

## 2024-09-25 ENCOUNTER — OFFICE VISIT (OUTPATIENT)
Age: 74
End: 2024-09-25
Payer: MEDICARE

## 2024-09-25 ENCOUNTER — HOSPITAL ENCOUNTER (OUTPATIENT)
Facility: HOSPITAL | Age: 74
Discharge: HOME | End: 2024-09-25
Admitting: SURGERY
Payer: MEDICARE

## 2024-09-25 VITALS — WEIGHT: 210 LBS | BODY MASS INDEX: 28.44 KG/M2 | HEIGHT: 72 IN

## 2024-09-25 DIAGNOSIS — I87.332 CHRONIC VENOUS HYPERTENSION WITH ULCER AND INFLAMMATION INVOLVING LEFT SIDE: ICD-10-CM

## 2024-09-25 DIAGNOSIS — I87.8 VENOUS STASIS: ICD-10-CM

## 2024-09-25 DIAGNOSIS — I87.2 VENOUS INSUFFICIENCY: ICD-10-CM

## 2024-09-25 DIAGNOSIS — I87.302 VENOUS HYPERTENSION OF LEFT LOWER EXTREMITY: Primary | ICD-10-CM

## 2024-09-25 PROCEDURE — 93971 EXTREMITY STUDY: CPT

## 2024-09-25 PROCEDURE — 1159F MED LIST DOCD IN RCRD: CPT | Performed by: NURSE PRACTITIONER

## 2024-09-25 PROCEDURE — 1160F RVW MEDS BY RX/DR IN RCRD: CPT | Performed by: NURSE PRACTITIONER

## 2024-09-25 PROCEDURE — 99213 OFFICE O/P EST LOW 20 MIN: CPT | Performed by: NURSE PRACTITIONER

## 2024-09-26 LAB
BH CV LOW VAS LEFT EXTERNAL ILIAC COMPRESS: NORMAL
BH CV LOWER VASCULAR LEFT COMMON FEMORAL COMPRESS: NORMAL
BH CV LOWER VASCULAR LEFT DISTAL FEMORAL COMPRESS: NORMAL
BH CV LOWER VASCULAR LEFT GASTRONEMIUS COMPRESS: NORMAL
BH CV LOWER VASCULAR LEFT MID FEMORAL COMPRESS: NORMAL
BH CV LOWER VASCULAR LEFT POPLITEAL COMPRESS: NORMAL
BH CV LOWER VASCULAR LEFT POSTERIOR TIBIAL COMPRESS: NORMAL
BH CV LOWER VASCULAR LEFT PROFUNDA FEMORAL COMPRESS: NORMAL
BH CV LOWER VASCULAR LEFT PROXIMAL FEMORAL COMPRESS: NORMAL
BH CV LOWER VASCULAR LEFT SAPHENOFEMORAL JUNCTION COMPRESS: NORMAL

## 2024-10-03 ENCOUNTER — HOSPITAL ENCOUNTER (OUTPATIENT)
Dept: CT IMAGING | Facility: HOSPITAL | Age: 74
Discharge: HOME | End: 2024-10-03
Admitting: SURGERY
Payer: MEDICARE

## 2024-10-03 DIAGNOSIS — I75.023 ATHEROEMBOLISM OF BILATERAL LOWER EXTREMITIES: ICD-10-CM

## 2024-10-03 DIAGNOSIS — I73.9 PAD (PERIPHERAL ARTERY DISEASE): ICD-10-CM

## 2024-10-03 PROCEDURE — 75635 CT ANGIO ABDOMINAL ARTERIES: CPT

## 2024-10-03 PROCEDURE — 25510000001 IOPAMIDOL PER 1 ML: Performed by: SURGERY

## 2024-10-03 RX ORDER — IOPAMIDOL 755 MG/ML
100 INJECTION, SOLUTION INTRAVASCULAR
Status: COMPLETED | OUTPATIENT
Start: 2024-10-03 | End: 2024-10-03

## 2024-10-03 RX ORDER — ISOSORBIDE MONONITRATE 30 MG/1
TABLET, EXTENDED RELEASE ORAL
Qty: 180 TABLET | Refills: 0 | Status: SHIPPED | OUTPATIENT
Start: 2024-10-03

## 2024-10-03 RX ADMIN — IOPAMIDOL 95 ML: 755 INJECTION, SOLUTION INTRAVENOUS at 14:51

## 2024-10-03 NOTE — NURSING NOTE
Patient here for outpatient CTA.  Took Prednisone #3 + Benadryl per protocol for Iodinated Contrast allergy at 1330 today.

## 2024-10-10 ENCOUNTER — OFFICE VISIT (OUTPATIENT)
Age: 74
End: 2024-10-10
Payer: MEDICARE

## 2024-10-10 VITALS — HEIGHT: 72 IN | WEIGHT: 220 LBS | BODY MASS INDEX: 29.8 KG/M2

## 2024-10-10 DIAGNOSIS — I70.212 ATHEROSCLEROSIS OF NATIVE ARTERY OF LEFT LOWER EXTREMITY WITH INTERMITTENT CLAUDICATION: ICD-10-CM

## 2024-10-10 DIAGNOSIS — I87.2 VENOUS INSUFFICIENCY: ICD-10-CM

## 2024-10-10 DIAGNOSIS — I70.299 ATHEROSCLEROSIS OF ARTERY OF EXTREMITY WITH ULCERATION: Primary | ICD-10-CM

## 2024-10-10 DIAGNOSIS — L97.909 ATHEROSCLEROSIS OF ARTERY OF EXTREMITY WITH ULCERATION: Primary | ICD-10-CM

## 2024-10-10 DIAGNOSIS — I73.9 PAD (PERIPHERAL ARTERY DISEASE): ICD-10-CM

## 2024-10-10 DIAGNOSIS — I87.8 VENOUS STASIS: ICD-10-CM

## 2024-10-10 DIAGNOSIS — I87.302 VENOUS HYPERTENSION OF LEFT LOWER EXTREMITY: ICD-10-CM

## 2024-10-10 NOTE — PROGRESS NOTES
Patient Name: Derik Borja    MRN: [id number] Encounter Date: 10/10/2024      Consulting Service: Vascular Surgery    Referring Provider: No ref. provider found       CHIEF COMPLAINT:  Chief Complaint   Patient presents with    Chronic Venous Insufficiency w/ Ulceration    Peripheral Vascular Disease       Subjective    HPI: Derik Borja is a 74 y.o. male is being seen for evaluation/management of peripheral vascular disease follow-up.  The patient has known peripheral vascular disease with symptoms of ulceration.  Current symptoms of claudication at 1 block distance are noted without lifestyle limitation.  Prior interventions include peripheral endovascular intervention.  Currently the patient reports symptoms are stable.  Testing today includes CT angiogram.  Their current medical regimen includes anticoagulation and cholesterol medications.  The patient appears to have normal sensation.  Discussion as to the protection of feet and toes with prevention of injury including twice a day monitoring of feet and all digits and avoidance of walking barefoot were reviewed with the patient.  Plan moving forward will include scheduling for endovascular or surgical intervention.    PAST MEDICAL HISTORY:   Past Medical History:   Diagnosis Date    Afib 07/06/2023    APC (atrial premature contractions) 07/21/2023    1.5% APC burden noted on Holter monitor 7/2023    Atherosclerosis of native arteries of the extremities with ulceration 03/31/2022    PVD-right calf    Atherosclerosis of native arteries of the extremities with ulceration     PVD-left calf    Atherosclerosis of native arteries of the extremities with ulceration 04/10/2023    other part of foot    Bunion of right foot 06/19/2023    CAD (coronary artery disease)     3/13    Cellulitis of left lower extremity 01/05/2023    Cellulitis of right lower extremity 01/05/2023    Foot pain, right 04/10/2023    Hyperlipidemia     PAD (peripheral artery disease)     PAF (paroxysmal  atrial fibrillation) 07/2023    Pneumonia 05/20/2019    PVC (premature ventricular contraction)     Sinus pause 07/21/2023    For postconversion pauses noted after atrial fibrillation on Holter monitor 7/2023    Varicose veins of bilateral lower extremities with other complications 03/31/2022    Varicose veins of left lower extremity with inflammation 07/06/2022    Venous insufficiency (chronic) (peripheral) 03/31/2022    Venous ulcer 03/31/2022      PAST SURGICAL HISTORY:   Past Surgical History:   Procedure Laterality Date    ILIAC ARTERY STENT  03/2013    PERIPHERAL ARTERIAL STENT GRAFT      about 9 years ago    SHOULDER SURGERY  2013    VARICOSE VEIN SURGERY Left 05/02/2022    Left GSV Venaseal Ablation    VARICOSE VEIN SURGERY Left 08/01/2022    Left Tributary Vein Varithena ablation    VARICOSE VEIN SURGERY Left 09/23/2024    Left VV Varithena    VENOUS ABLATION CHEMICAL  09/23/2024      FAMILY HISTORY:   Family History   Problem Relation Age of Onset    Stroke Mother     Heart disease Mother     Emphysema Father     Heart disease Brother     Kidney cancer Brother       SOCIAL HISTORY:   Social History     Tobacco Use    Smoking status: Every Day     Current packs/day: 0.50     Average packs/day: 0.5 packs/day for 35.0 years (17.5 ttl pk-yrs)     Types: Cigarettes     Passive exposure: Current    Smokeless tobacco: Never    Tobacco comments:     caffeine use - 3 cups coffee daily     Pt smokes 5-6 cigarettes a day    Vaping Use    Vaping status: Never Used   Substance Use Topics    Alcohol use: Yes     Comment: once a month - rarely    Drug use: Yes     Types: Marijuana      MEDICATIONS:   Current Outpatient Medications on File Prior to Visit   Medication Sig Dispense Refill    apixaban (ELIQUIS) 5 MG tablet tablet Take 1 tablet by mouth 2 (Two) Times a Day. 28 tablet 0    Ascorbic Acid (VITAMIN C PO) Take 1,000 mg by mouth Daily.      atorvastatin (LIPITOR) 80 MG tablet TAKE 1 TABLET BY MOUTH ONCE DAILY AT  "NIGHT 90 tablet 3    Bioflavonoid Products (SANJAY-C PO) Take 1 tablet by mouth Daily. 1000-50 mg      buPROPion SR (WELLBUTRIN SR) 150 MG 12 hr tablet       clopidogrel (PLAVIX) 75 MG tablet Take 1 tablet by mouth once daily 90 tablet 1    cyclobenzaprine (FLEXERIL) 10 MG tablet Take 1 tablet by mouth Every Night.      Docusate Sodium (COLACE PO) Take 1 tablet by mouth Every Night.      doxycycline (VIBRAMYCIN) 100 MG capsule Take 1 capsule by mouth 2 (Two) Times a Day. 14 capsule 3    fluticasone (FLONASE) 50 MCG/ACT nasal spray Administer 2 sprays into the nostril(s) as directed by provider Daily.      isosorbide mononitrate (IMDUR) 30 MG 24 hr tablet Take 2 tablets by mouth once daily 180 tablet 0    meloxicam (MOBIC) 15 MG tablet Take 1 tablet by mouth Daily.      metoprolol succinate XL (TOPROL-XL) 50 MG 24 hr tablet Take 1 tablet by mouth Daily.      Multiple Vitamin (MULTIVITAMIN) tablet Take 1 tablet by mouth Daily.      nitroglycerin (NITROSTAT) 0.4 MG SL tablet 1 under the tongue as needed for angina, may repeat q5mins for up three doses 30 tablet 3    predniSONE (DELTASONE) 50 MG tablet Take 1 tablet by mouth Take As Directed for 3 doses. Take 1 tablet (50mg) 13 Hours 7 Hours & 1 Hour Prior to Exam 3 tablet 0    sotalol (BETAPACE AF) 80 MG tablet tablet Take 1/2 (one-half) tablet by mouth twice daily 90 tablet 0    traMADol (ULTRAM) 50 MG tablet Take 1 tablet by mouth Every 8 (Eight) Hours As Needed.      vitamin D3 125 MCG (5000 UT) capsule capsule Take 1 capsule by mouth Daily.      Zinc 50 MG tablet Take 1 tablet by mouth Daily.       No current facility-administered medications on file prior to visit.       ALLERGIES: Iodinated contrast media, Iodine, and Penicillins       Objective   Vitals:    10/10/24 1001   Weight: 99.8 kg (220 lb)   Height: 182.9 cm (72.01\")     Body mass index is 29.83 kg/m².          PHYSICAL EXAM:   Physical Exam  Constitutional:       Appearance: Normal appearance. He is " normal weight.   HENT:      Head: Normocephalic and atraumatic.      Nose: Nose normal.   Eyes:      Extraocular Movements: Extraocular movements intact.      Pupils: Pupils are equal, round, and reactive to light.   Cardiovascular:      Rate and Rhythm: Normal rate.      Pulses:           Carotid pulses are 2+ on the right side and 2+ on the left side.       Radial pulses are 2+ on the right side and 2+ on the left side.        Femoral pulses are 2+ on the right side and 1+ on the left side.       Popliteal pulses are 2+ on the right side and 0 on the left side.        Dorsalis pedis pulses are 1+ on the right side and detected w/ Doppler on the left side.        Posterior tibial pulses are 1+ on the right side and detected w/ Doppler on the left side.      Heart sounds: Normal heart sounds.   Pulmonary:      Effort: Pulmonary effort is normal.      Breath sounds: Normal breath sounds.   Abdominal:      General: Abdomen is flat. Bowel sounds are normal.      Palpations: Abdomen is soft.   Musculoskeletal:         General: Normal range of motion.      Cervical back: Normal range of motion and neck supple.      Right lower leg: No edema.      Left lower leg: No edema.   Skin:     General: Skin is warm and dry.   Neurological:      General: No focal deficit present.      Mental Status: He is alert and oriented to person, place, and time. Mental status is at baseline.   Psychiatric:         Mood and Affect: Mood normal.         Thought Content: Thought content normal.          Result Review   LABS:                   Results Review:       I reviewed the patient's new clinical results.    The following radiologic or non-invasive studies have been reviewed by me: CT angiogram reviewed with images reviewed with the patient  Duplex Venous Lower Extremity - Left CAR 09/25/2024    Interpretation Summary    Patent tributary and extension veins remain open despite recent Varithena procedure.  No evidence of DVT or   vein thrombosis.     No radiology results for the last 30 days.                ASSESSMENT/PLAN:   Diagnoses and all orders for this visit:    1. Atherosclerosis of artery of extremity with ulceration (Primary)  -     Case Request; Standing  -     vancomycin (VANCOCIN) 1,500 mg in sodium chloride 0.9 % 250 mL IVPB  -     Case Request    2. Atherosclerosis of native artery of left lower extremity with intermittent claudication  -     Case Request; Standing  -     vancomycin (VANCOCIN) 1,500 mg in sodium chloride 0.9 % 250 mL IVPB  -     Case Request    3. PAD (peripheral artery disease)  -     Case Request; Standing  -     vancomycin (VANCOCIN) 1,500 mg in sodium chloride 0.9 % 250 mL IVPB  -     Case Request    4. Venous hypertension of left lower extremity    5. Venous insufficiency    6. Venous stasis    Other orders  -     Follow Anesthesia Guidelines / Protocol; Future  -     Follow Anesthesia Guidelines / Protocol; Standing  -     Verify NPO Status; Standing  -     Clip Operative Site; Standing  -     Verify / Perform Chlorhexidine Skin Prep; Standing  -     Provide Patient With Instructions on NPO Status; Future  -     Provide Chlorhexidine Skin Prep Wipes and Instructions; Future  -     Place Sequential Compression Device; Standing  -     Maintain Sequential Compression Device; Standing       74 y.o. male with findings of known SFA occlusions with large collateral runoff with dominant profundus but what appears to be recurrence of iliac disease just beyond the stent in the common and within the external iliac as well.  I think that going right to left with arteriogram and iliac vein or iliac artery intervention is our best choice here.  Will start with potential angioplasty and possible stents.  He will hold his Eliquis for 48 hours preop.  Femoropopliteal bypass above-knee could be pursued if it was actually necessary but I am hopeful that this with his excellent collateral network will make the difference in  healing the ulcer at his right medial malleoli are area.  We have already treated his veins as well as we can.    The rest of his CT shows similar disease on the right without the iliac stenosis.  The other issue that really is of concern to me is the fact that he has constipation within the right and ACEs and transverse colon but nothing really in the left I know he has had his Cologuard 3 years ago but and I have him get back into see Camille Oliveira and discuss whether endoscopy would be needed if this seems like an unusual pattern for constipation.  He has some hepatic cysts that do not seem to be too worrisome but we will defer that to her excellent care as well.    I discussed the plan with the patient who is agreeable to the plan of care at this point. Thank you for this consult.   Follow Up  Return in about 1 month (around 11/10/2024).    Sukh Villatoro MD   10/10/24

## 2024-10-18 ENCOUNTER — PRE-ADMISSION TESTING (OUTPATIENT)
Dept: PREADMISSION TESTING | Facility: HOSPITAL | Age: 74
End: 2024-10-18
Payer: MEDICARE

## 2024-10-18 VITALS
TEMPERATURE: 98.3 F | BODY MASS INDEX: 30.45 KG/M2 | WEIGHT: 217.5 LBS | OXYGEN SATURATION: 97 % | HEART RATE: 78 BPM | RESPIRATION RATE: 18 BRPM | SYSTOLIC BLOOD PRESSURE: 121 MMHG | DIASTOLIC BLOOD PRESSURE: 76 MMHG | HEIGHT: 71 IN

## 2024-10-18 LAB
ANION GAP SERPL CALCULATED.3IONS-SCNC: 6.5 MMOL/L (ref 5–15)
BUN SERPL-MCNC: 16 MG/DL (ref 8–23)
BUN/CREAT SERPL: 16.7 (ref 7–25)
CALCIUM SPEC-SCNC: 9.5 MG/DL (ref 8.6–10.5)
CHLORIDE SERPL-SCNC: 105 MMOL/L (ref 98–107)
CO2 SERPL-SCNC: 26.5 MMOL/L (ref 22–29)
CREAT SERPL-MCNC: 0.96 MG/DL (ref 0.76–1.27)
DEPRECATED RDW RBC AUTO: 41.6 FL (ref 37–54)
EGFRCR SERPLBLD CKD-EPI 2021: 82.9 ML/MIN/1.73
ERYTHROCYTE [DISTWIDTH] IN BLOOD BY AUTOMATED COUNT: 11.8 % (ref 12.3–15.4)
GLUCOSE SERPL-MCNC: 102 MG/DL (ref 65–99)
HCT VFR BLD AUTO: 39.7 % (ref 37.5–51)
HGB BLD-MCNC: 13.3 G/DL (ref 13–17.7)
MCH RBC QN AUTO: 32.7 PG (ref 26.6–33)
MCHC RBC AUTO-ENTMCNC: 33.5 G/DL (ref 31.5–35.7)
MCV RBC AUTO: 97.5 FL (ref 79–97)
PLATELET # BLD AUTO: 162 10*3/MM3 (ref 140–450)
PMV BLD AUTO: 9.1 FL (ref 6–12)
POTASSIUM SERPL-SCNC: 4.1 MMOL/L (ref 3.5–5.2)
RBC # BLD AUTO: 4.07 10*6/MM3 (ref 4.14–5.8)
SODIUM SERPL-SCNC: 138 MMOL/L (ref 136–145)
WBC NRBC COR # BLD AUTO: 7.87 10*3/MM3 (ref 3.4–10.8)

## 2024-10-18 PROCEDURE — 36415 COLL VENOUS BLD VENIPUNCTURE: CPT

## 2024-10-18 PROCEDURE — 85027 COMPLETE CBC AUTOMATED: CPT

## 2024-10-18 PROCEDURE — 80048 BASIC METABOLIC PNL TOTAL CA: CPT

## 2024-10-18 NOTE — DISCHARGE INSTRUCTIONS
Take the following medications the morning of surgery: METOPROLOL  AND SOTALOL      If you are on prescription narcotic pain medication to control your pain you may also take that medication the morning of surgery.      General Instructions:     Do not eat solid food after midnight the night before surgery.  Clear liquids day of surgery are allowed but must be stopped at least two hours before your hospital arrival time.       Allowed clear liquids      Water, sodas, and tea or coffee with no cream or milk added.       12 to 20 ounces of a clear liquid that contains carbohydrates is recommended.  If non-diabetic, have Gatorade or Powerade.  If diabetic, have G2 or Powerade Zero.     Do not have liquids red in color.  Do not consume chicken, beef, pork or vegetable broth or bouillon cubes of any variety as they are not considered clear liquids and are not allowed.      Infants may have breast milk up to four hours before surgery.  Infants drinking formula may drink formula up to six hours before surgery.   Patients who avoid smoking, chewing tobacco and alcohol for 4 weeks prior to surgery have a reduced risk of post-operative complications.  Quit smoking as many days before surgery as you can.  Do not smoke, use chewing tobacco or drink alcohol the day of surgery.   If applicable bring your C-PAP/ BI-PAP machine in with you to preop day of surgery.  Bring any papers given to you in the doctor’s office.  Wear clean comfortable clothes.  Do not wear contact lenses, false eyelashes or make-up.  Bring a case for your glasses.   Bring crutches or walker if applicable.  Remove all piercings.  Leave jewelry and any other valuables at home.  Hair extensions with metal clips must be removed prior to surgery.  The Pre-Admission Testing nurse will instruct you to bring medications if unable to obtain an accurate list in Pre-Admission Testing.        If you were given a blood bank ID arm band remember to bring it with you the  day of surgery.    Preventing a Surgical Site Infection:  For 2 to 3 days before surgery, avoid shaving with a razor because the razor can irritate skin and make it easier to develop an infection.    Any areas of open skin can increase the risk of a post-operative wound infection by allowing bacteria to enter and travel throughout the body.  Notify your surgeon if you have any skin wounds / rashes even if it is not near the expected surgical site.  The area will need assessed to determine if surgery should be delayed until it is healed.  The night prior to surgery shower using a fresh bar of anti-bacterial soap (such as Dial) and clean washcloth.  Sleep in a clean bed with clean clothing.  Do not allow pets to sleep with you.  Shower on the morning of surgery using a fresh bar of anti-bacterial soap (such as Dial) and clean washcloth.  Dry with a clean towel and dress in clean clothing.  Ask your surgeon if you will be receiving antibiotics prior to surgery.  Make sure you, your family, and all healthcare providers clean their hands with soap and water or an alcohol based hand  before caring for you or your wound.  CHLORHEXIDINE CLOTH INSTRUCTIONS  The morning of surgery follow these instructions using the Chlorhexidine cloths you've been given.  These steps reduce bacteria on the body.  Do not use the cloths near your eyes, ears mouth, genitalia or on open wounds.  Throw the cloths away after use but do not try to flush them down a toilet.      Open and remove one cloth at a time from the package.    Leave the cloth unfolded and begin the bathing.  Massage the skin with the cloths using gentle pressure to remove bacteria.  Do not scrub harshly.   Follow the steps below with one 2% CHG cloth per area (6 total cloths).  One cloth for neck, shoulders and chest.  One cloth for both arms, hands, fingers and underarms (do underarms last).  One cloth for the abdomen followed by groin.  One cloth for right leg and  foot including between the toes.  One cloth for left leg and foot including between the toes.  The last cloth is to be used for the back of the neck, back and buttocks.    Allow the CHG to air dry 3 minutes on the skin which will give it time to work and decrease the chance of irritation.  The skin may feel sticky until it is dry.  Do not rinse with water or any other liquid or you will lose the beneficial effects of the CHG.  If mild skin irritation occurs, do rinse the skin to remove the CHG.  Report this to the nurse at time of admission.  Do not apply lotions, creams, ointments, deodorants or perfumes after using the clothes. Dress in clean clothes before coming to the hospital.  Day of surgery:  Your arrival time is approximately two hours before your scheduled surgery time.  Please note if you have an early arrival time the surgery doors do not open before 5:00 AM.  Upon arrival, a Pre-op nurse and Anesthesiologist will review your health history, obtain vital signs, and answer questions you may have.  The only belongings needed at this time will be a list of your home medications and if applicable your C-PAP/BI-PAP machine.  A Pre-op nurse will start an IV and you may receive medication in preparation for surgery, including something to help you relax.     Please be aware that surgery does come with discomfort.  We want to make every effort to control your discomfort so please discuss any uncontrolled symptoms with your nurse.   Your doctor will most likely have prescribed pain medications.      If you are going home after surgery you will receive individualized written care instructions before being discharged.  A responsible adult must drive you to and from the hospital on the day of your surgery and ideally stay with you through the night.   .  Discharge prescriptions can be filled by the hospital pharmacy during regular pharmacy hours.  If you are having surgery late in the day/evening your prescription  may be e-prescribed to your pharmacy.  Please verify your pharmacy hours or chose a 24 hour pharmacy to avoid not having access to your prescription because your pharmacy has closed for the day.    If you are staying overnight following surgery, you will be transported to your hospital room following the recovery period.  [facility] has all private rooms.    If you have any questions please call Pre-Admission Testing at [phone].  Deductibles and co-payments are collected on the day of service. Please be prepared to pay the required co-pay, deductible or deposit on the day of service as defined by your plan.    Call your surgeon immediately if you experience any of the following symptoms:  Sore Throat  Shortness of Breath or difficulty breathing  Cough  Chills  Body soreness or muscle pain  Headache  Fever  New loss of taste or smell  Do not arrive for your surgery ill.  Your procedure will need to be rescheduled to another time.  You will need to call your physician before the day of surgery to avoid any unnecessary exposure to hospital staff as well as other patients.

## 2024-10-22 ENCOUNTER — TELEPHONE (OUTPATIENT)
Dept: CARDIOLOGY | Facility: CLINIC | Age: 74
End: 2024-10-22
Payer: MEDICARE

## 2024-10-22 NOTE — TELEPHONE ENCOUNTER
I spoke to the pt and he is aware. That we have samples at the [facility] office and they are ready for .

## 2024-10-25 ENCOUNTER — ANESTHESIA EVENT (OUTPATIENT)
Dept: PERIOP | Facility: HOSPITAL | Age: 74
End: 2024-10-25
Payer: MEDICARE

## 2024-10-25 ENCOUNTER — APPOINTMENT (OUTPATIENT)
Dept: GENERAL RADIOLOGY | Facility: HOSPITAL | Age: 74
End: 2024-10-25
Payer: MEDICARE

## 2024-10-25 ENCOUNTER — HOSPITAL ENCOUNTER (OUTPATIENT)
Facility: HOSPITAL | Age: 74
Setting detail: HOSPITAL OUTPATIENT SURGERY
Discharge: HOME | End: 2024-10-25
Attending: SURGERY | Admitting: SURGERY
Payer: MEDICARE

## 2024-10-25 ENCOUNTER — ANESTHESIA (OUTPATIENT)
Dept: PERIOP | Facility: HOSPITAL | Age: 74
End: 2024-10-25
Payer: MEDICARE

## 2024-10-25 VITALS
TEMPERATURE: 97.9 F | OXYGEN SATURATION: 94 % | HEART RATE: 67 BPM | RESPIRATION RATE: 16 BRPM | SYSTOLIC BLOOD PRESSURE: 136 MMHG | DIASTOLIC BLOOD PRESSURE: 90 MMHG

## 2024-10-25 DIAGNOSIS — I73.9 PAD (PERIPHERAL ARTERY DISEASE): ICD-10-CM

## 2024-10-25 DIAGNOSIS — I70.212 ATHEROSCLEROSIS OF NATIVE ARTERY OF LEFT LOWER EXTREMITY WITH INTERMITTENT CLAUDICATION: ICD-10-CM

## 2024-10-25 DIAGNOSIS — I70.299 ATHEROSCLEROSIS OF ARTERY OF EXTREMITY WITH ULCERATION: ICD-10-CM

## 2024-10-25 DIAGNOSIS — L97.909 ATHEROSCLEROSIS OF ARTERY OF EXTREMITY WITH ULCERATION: ICD-10-CM

## 2024-10-25 PROCEDURE — C1760 CLOSURE DEV, VASC: HCPCS | Performed by: SURGERY

## 2024-10-25 PROCEDURE — C1725 CATH, TRANSLUMIN NON-LASER: HCPCS | Performed by: SURGERY

## 2024-10-25 PROCEDURE — 25010000002 PROPOFOL 200 MG/20ML EMULSION: Performed by: NURSE ANESTHETIST, CERTIFIED REGISTERED

## 2024-10-25 PROCEDURE — C1769 GUIDE WIRE: HCPCS | Performed by: SURGERY

## 2024-10-25 PROCEDURE — 25010000002 PROPOFOL 10 MG/ML EMULSION: Performed by: NURSE ANESTHETIST, CERTIFIED REGISTERED

## 2024-10-25 PROCEDURE — C1876 STENT, NON-COA/NON-COV W/DEL: HCPCS | Performed by: SURGERY

## 2024-10-25 PROCEDURE — 25010000002 BUPIVACAINE (PF) 0.25 % SOLUTION 30 ML VIAL: Performed by: SURGERY

## 2024-10-25 PROCEDURE — 76937 US GUIDE VASCULAR ACCESS: CPT | Performed by: SURGERY

## 2024-10-25 PROCEDURE — 25010000002 DIPHENHYDRAMINE PER 50 MG: Performed by: ANESTHESIOLOGY

## 2024-10-25 PROCEDURE — 25010000002 HEPARIN (PORCINE) PER 1000 UNITS: Performed by: SURGERY

## 2024-10-25 PROCEDURE — 25010000002 HEPARIN (PORCINE) PER 1000 UNITS: Performed by: NURSE ANESTHETIST, CERTIFIED REGISTERED

## 2024-10-25 PROCEDURE — 25010000002 LIDOCAINE 1 % SOLUTION 20 ML VIAL: Performed by: SURGERY

## 2024-10-25 PROCEDURE — C1887 CATHETER, GUIDING: HCPCS | Performed by: SURGERY

## 2024-10-25 PROCEDURE — 25810000003 LACTATED RINGERS PER 1000 ML: Performed by: ANESTHESIOLOGY

## 2024-10-25 PROCEDURE — 25010000002 VANCOMYCIN 5 G RECONSTITUTED SOLUTION: Performed by: SURGERY

## 2024-10-25 PROCEDURE — 25010000002 HYDROCORTISONE SOD SUC (PF) 100 MG RECONSTITUTED SOLUTION: Performed by: SURGERY

## 2024-10-25 PROCEDURE — 75630 X-RAY AORTA LEG ARTERIES: CPT

## 2024-10-25 PROCEDURE — C1894 INTRO/SHEATH, NON-LASER: HCPCS | Performed by: SURGERY

## 2024-10-25 PROCEDURE — 25010000002 LIDOCAINE 2% SOLUTION: Performed by: NURSE ANESTHETIST, CERTIFIED REGISTERED

## 2024-10-25 PROCEDURE — 25510000001 IOPAMIDOL PER 1 ML: Performed by: SURGERY

## 2024-10-25 PROCEDURE — 25810000003 SODIUM CHLORIDE 0.9 % SOLUTION: Performed by: SURGERY

## 2024-10-25 PROCEDURE — 37221 PR REVSC OPN/PRQ ILIAC ART W/STNT PLMT & ANGIOPLSTY: CPT | Performed by: SURGERY

## 2024-10-25 DEVICE — STENT PRB35-08-040-080 PROTEGE EF V07
Type: IMPLANTABLE DEVICE | Site: ARTERY ILIAC | Status: FUNCTIONAL
Brand: EVERFLEX™ PROTÉGÉ™ EVERFLEX™

## 2024-10-25 DEVICE — STENT SERB65-09-30-80 PROTEGE GPS V06
Type: IMPLANTABLE DEVICE | Site: ARTERY ILIAC | Status: FUNCTIONAL
Brand: PROTÉGÉ™ GPS™

## 2024-10-25 RX ORDER — HYDRALAZINE HYDROCHLORIDE 20 MG/ML
5 INJECTION INTRAMUSCULAR; INTRAVENOUS
Status: DISCONTINUED | OUTPATIENT
Start: 2024-10-25 | End: 2024-10-25 | Stop reason: HOSPADM

## 2024-10-25 RX ORDER — DIPHENHYDRAMINE HYDROCHLORIDE 50 MG/ML
12.5 INJECTION INTRAMUSCULAR; INTRAVENOUS
Status: DISCONTINUED | OUTPATIENT
Start: 2024-10-25 | End: 2024-10-25 | Stop reason: HOSPADM

## 2024-10-25 RX ORDER — SODIUM CHLORIDE 0.9 % (FLUSH) 0.9 %
3 SYRINGE (ML) INJECTION EVERY 12 HOURS SCHEDULED
Status: DISCONTINUED | OUTPATIENT
Start: 2024-10-25 | End: 2024-10-25 | Stop reason: HOSPADM

## 2024-10-25 RX ORDER — PROMETHAZINE HYDROCHLORIDE 25 MG/1
25 TABLET ORAL ONCE AS NEEDED
Status: DISCONTINUED | OUTPATIENT
Start: 2024-10-25 | End: 2024-10-25 | Stop reason: HOSPADM

## 2024-10-25 RX ORDER — PROPOFOL 10 MG/ML
INJECTION, EMULSION INTRAVENOUS AS NEEDED
Status: DISCONTINUED | OUTPATIENT
Start: 2024-10-25 | End: 2024-10-25 | Stop reason: SURG

## 2024-10-25 RX ORDER — SODIUM CHLORIDE 0.9 % (FLUSH) 0.9 %
3-10 SYRINGE (ML) INJECTION AS NEEDED
Status: DISCONTINUED | OUTPATIENT
Start: 2024-10-25 | End: 2024-10-25 | Stop reason: HOSPADM

## 2024-10-25 RX ORDER — EPHEDRINE SULFATE 50 MG/ML
5 INJECTION, SOLUTION INTRAVENOUS ONCE AS NEEDED
Status: DISCONTINUED | OUTPATIENT
Start: 2024-10-25 | End: 2024-10-25 | Stop reason: HOSPADM

## 2024-10-25 RX ORDER — SODIUM CHLORIDE, SODIUM LACTATE, POTASSIUM CHLORIDE, CALCIUM CHLORIDE 600; 310; 30; 20 MG/100ML; MG/100ML; MG/100ML; MG/100ML
9 INJECTION, SOLUTION INTRAVENOUS CONTINUOUS
Status: DISCONTINUED | OUTPATIENT
Start: 2024-10-25 | End: 2024-10-25 | Stop reason: HOSPADM

## 2024-10-25 RX ORDER — FLUMAZENIL 0.1 MG/ML
0.2 INJECTION INTRAVENOUS AS NEEDED
Status: DISCONTINUED | OUTPATIENT
Start: 2024-10-25 | End: 2024-10-25 | Stop reason: HOSPADM

## 2024-10-25 RX ORDER — LIDOCAINE HYDROCHLORIDE 10 MG/ML
0.5 INJECTION, SOLUTION INFILTRATION; PERINEURAL ONCE AS NEEDED
Status: DISCONTINUED | OUTPATIENT
Start: 2024-10-25 | End: 2024-10-25 | Stop reason: HOSPADM

## 2024-10-25 RX ORDER — NALOXONE HCL 0.4 MG/ML
0.2 VIAL (ML) INJECTION AS NEEDED
Status: DISCONTINUED | OUTPATIENT
Start: 2024-10-25 | End: 2024-10-25 | Stop reason: HOSPADM

## 2024-10-25 RX ORDER — FENTANYL CITRATE 50 UG/ML
50 INJECTION, SOLUTION INTRAMUSCULAR; INTRAVENOUS
Status: DISCONTINUED | OUTPATIENT
Start: 2024-10-25 | End: 2024-10-25 | Stop reason: HOSPADM

## 2024-10-25 RX ORDER — VANCOMYCIN/0.9 % SOD CHLORIDE 1.5G/250ML
15 PLASTIC BAG, INJECTION (ML) INTRAVENOUS ONCE
Status: COMPLETED | OUTPATIENT
Start: 2024-10-25 | End: 2024-10-25

## 2024-10-25 RX ORDER — HYDROMORPHONE HYDROCHLORIDE 1 MG/ML
0.5 INJECTION, SOLUTION INTRAMUSCULAR; INTRAVENOUS; SUBCUTANEOUS
Status: DISCONTINUED | OUTPATIENT
Start: 2024-10-25 | End: 2024-10-25 | Stop reason: HOSPADM

## 2024-10-25 RX ORDER — IOPAMIDOL 510 MG/ML
200 INJECTION, SOLUTION INTRAVASCULAR
Status: COMPLETED | OUTPATIENT
Start: 2024-10-25 | End: 2024-10-25

## 2024-10-25 RX ORDER — HYDROCODONE BITARTRATE AND ACETAMINOPHEN 5; 325 MG/1; MG/1
1 TABLET ORAL ONCE AS NEEDED
Status: DISCONTINUED | OUTPATIENT
Start: 2024-10-25 | End: 2024-10-25 | Stop reason: HOSPADM

## 2024-10-25 RX ORDER — MIDAZOLAM HYDROCHLORIDE 1 MG/ML
0.5 INJECTION INTRAMUSCULAR; INTRAVENOUS
Status: DISCONTINUED | OUTPATIENT
Start: 2024-10-25 | End: 2024-10-25 | Stop reason: HOSPADM

## 2024-10-25 RX ORDER — DIPHENHYDRAMINE HYDROCHLORIDE 50 MG/ML
25 INJECTION INTRAMUSCULAR; INTRAVENOUS ONCE
Status: COMPLETED | OUTPATIENT
Start: 2024-10-25 | End: 2024-10-25

## 2024-10-25 RX ORDER — FAMOTIDINE 10 MG/ML
20 INJECTION, SOLUTION INTRAVENOUS ONCE
Status: COMPLETED | OUTPATIENT
Start: 2024-10-25 | End: 2024-10-25

## 2024-10-25 RX ORDER — ONDANSETRON 2 MG/ML
4 INJECTION INTRAMUSCULAR; INTRAVENOUS ONCE AS NEEDED
Status: DISCONTINUED | OUTPATIENT
Start: 2024-10-25 | End: 2024-10-25 | Stop reason: HOSPADM

## 2024-10-25 RX ORDER — DROPERIDOL 2.5 MG/ML
0.62 INJECTION, SOLUTION INTRAMUSCULAR; INTRAVENOUS
Status: DISCONTINUED | OUTPATIENT
Start: 2024-10-25 | End: 2024-10-25 | Stop reason: HOSPADM

## 2024-10-25 RX ORDER — LABETALOL HYDROCHLORIDE 5 MG/ML
5 INJECTION, SOLUTION INTRAVENOUS
Status: DISCONTINUED | OUTPATIENT
Start: 2024-10-25 | End: 2024-10-25 | Stop reason: HOSPADM

## 2024-10-25 RX ORDER — HYDROCODONE BITARTRATE AND ACETAMINOPHEN 5; 325 MG/1; MG/1
1 TABLET ORAL EVERY 4 HOURS PRN
Qty: 40 TABLET | Refills: 0 | Status: SHIPPED | OUTPATIENT
Start: 2024-10-25

## 2024-10-25 RX ORDER — IPRATROPIUM BROMIDE AND ALBUTEROL SULFATE 2.5; .5 MG/3ML; MG/3ML
3 SOLUTION RESPIRATORY (INHALATION) ONCE AS NEEDED
Status: DISCONTINUED | OUTPATIENT
Start: 2024-10-25 | End: 2024-10-25 | Stop reason: HOSPADM

## 2024-10-25 RX ORDER — PROMETHAZINE HYDROCHLORIDE 25 MG/1
25 SUPPOSITORY RECTAL ONCE AS NEEDED
Status: DISCONTINUED | OUTPATIENT
Start: 2024-10-25 | End: 2024-10-25 | Stop reason: HOSPADM

## 2024-10-25 RX ORDER — LIDOCAINE HYDROCHLORIDE 20 MG/ML
INJECTION, SOLUTION INFILTRATION; PERINEURAL AS NEEDED
Status: DISCONTINUED | OUTPATIENT
Start: 2024-10-25 | End: 2024-10-25 | Stop reason: SURG

## 2024-10-25 RX ORDER — OXYCODONE AND ACETAMINOPHEN 7.5; 325 MG/1; MG/1
1 TABLET ORAL EVERY 4 HOURS PRN
Status: DISCONTINUED | OUTPATIENT
Start: 2024-10-25 | End: 2024-10-25 | Stop reason: HOSPADM

## 2024-10-25 RX ORDER — FENTANYL CITRATE 50 UG/ML
50 INJECTION, SOLUTION INTRAMUSCULAR; INTRAVENOUS ONCE AS NEEDED
Status: DISCONTINUED | OUTPATIENT
Start: 2024-10-25 | End: 2024-10-25 | Stop reason: HOSPADM

## 2024-10-25 RX ORDER — HEPARIN SODIUM 1000 [USP'U]/ML
INJECTION, SOLUTION INTRAVENOUS; SUBCUTANEOUS AS NEEDED
Status: DISCONTINUED | OUTPATIENT
Start: 2024-10-25 | End: 2024-10-25 | Stop reason: SURG

## 2024-10-25 RX ADMIN — PROPOFOL 70 MG: 10 INJECTION, EMULSION INTRAVENOUS at 11:42

## 2024-10-25 RX ADMIN — DIPHENHYDRAMINE HYDROCHLORIDE 25 MG: 50 INJECTION, SOLUTION INTRAMUSCULAR; INTRAVENOUS at 11:08

## 2024-10-25 RX ADMIN — IOPAMIDOL 98 ML: 510 INJECTION, SOLUTION INTRAVASCULAR at 12:55

## 2024-10-25 RX ADMIN — SODIUM CHLORIDE, POTASSIUM CHLORIDE, SODIUM LACTATE AND CALCIUM CHLORIDE 9 ML/HR: 600; 310; 30; 20 INJECTION, SOLUTION INTRAVENOUS at 11:09

## 2024-10-25 RX ADMIN — Medication 3 ML: at 11:26

## 2024-10-25 RX ADMIN — PROPOFOL 110 MCG/KG/MIN: 10 INJECTION, EMULSION INTRAVENOUS at 11:42

## 2024-10-25 RX ADMIN — LIDOCAINE HYDROCHLORIDE 60 MG: 20 INJECTION, SOLUTION INFILTRATION; PERINEURAL at 11:42

## 2024-10-25 RX ADMIN — HEPARIN SODIUM 3000 UNITS: 1000 INJECTION, SOLUTION INTRAVENOUS; SUBCUTANEOUS at 12:06

## 2024-10-25 RX ADMIN — FAMOTIDINE 20 MG: 10 INJECTION INTRAVENOUS at 11:08

## 2024-10-25 RX ADMIN — VANCOMYCIN HYDROCHLORIDE 1500 MG: 500 INJECTION, POWDER, LYOPHILIZED, FOR SOLUTION INTRAVENOUS at 11:09

## 2024-10-25 RX ADMIN — HYDROCORTISONE SODIUM SUCCINATE 100 MG: 100 INJECTION, POWDER, FOR SOLUTION INTRAMUSCULAR; INTRAVENOUS at 11:08

## 2024-10-25 NOTE — DISCHARGE INSTRUCTIONS
[facility]  Nahum Womack, Nicolas, Shukri Villatoro,Jean, Darryn  [address]  [place] [id number] [phone]      Discharge Instructions    Go home, rest and take it easy today.      You may experience some dizziness or memory loss from the anesthesia.  This may last for the next 24 hours.  Someone should plan on staying with you for the first 24 hours for your safety.    Do not make any important legal decisions or sign any legal papers for the next 24 hours.      Eat and drink lightly today.  Start off with liquids, jello, soup, crackers or other bland foods at first. You may advance your diet tomorrow as tolerated as long as you do not experience any nausea or vomiting.    You may resume routine medications including blood thinners. However, Glucophage should be not be started for 72 hours after the dye is given.      No lifting over 10 pounds and no strenuous activity for the next 2-3 days.    Try not to strain or bear down when your bowels move or when you empty your bladder.    Limit going up and down steps for 2 days.    No driving for the remainder of the day.  You may resume limited driving tomorrow if necessary.      You may shower tomorrow.  No bath or hot tubs for at least 2 days after the procedure.          Leave the pressure dressing on until tomorrow morning.  You may then take this off, as well as the small see through dressing with gauze tomorrow.  If it doesn’t come off easily, do not pull this off.  If it is stuck, shower and let the warm water loosen it before removal.       Check your groin dressing regularly for bleeding through the dressing (under the pressure dressing).  A small amount of blood contained by the gauze is normal; the whole dressing should not be filled with blood or leaking out under the sides.     If you experience bleeding through the gauze/clear sticky dressing, lay flat and have someone apply direct pressure for 15 minutes.  If  bleeding has stopped after this, you may put a clean gauze and tape over the area.  Continue to lie flat for 1-2 hours.  If bleeding continues after 15 minutes of pressure, call us at the number listed above.  There is an MD available after hours.      If you experience heavy bleeding or large swelling, continue to hold firm pressure and              call 911.  Do not call the MD on call.      If you experience pain or discomfort you may take Tylenol or Ibuprofen, whichever you normally use for minor discomfort, unless otherwise instructed.        If the MD gives you a prescription for narcotic pain pills (Tylenol #3, Vicodin, Hydrocodone, Oxycodone or Percocet), you cannot drive a vehicle or operate machinery while taking these.     Severe pain is not expected after this procedure.  If you experience severe pain, please call our office at [phone].     Remember to contact our office for any of the following:    Fever > 101 degrees  Severe pain that cannot be controlled by taking your pain pills  Severe nausea or vomiting   Significant bleeding of your incisions  Drainage that has a bad smell or is yellow or green in appearance  Any other questions or concerns

## 2024-10-25 NOTE — ANESTHESIA PREPROCEDURE EVALUATION
Anesthesia Evaluation     NPO Solid Status: > 8 hours             Airway   Mallampati: II  Dental      Pulmonary    (+) a smoker Current, Abstained day of surgery, COPD,  (-) sleep apnea    ROS comment: Negative patient screen for FEDERICO    Cardiovascular     (+) hypertension, dysrhythmias Paroxysmal Atrial Fib, PVD      Neuro/Psych  GI/Hepatic/Renal/Endo    (+) obesity    Musculoskeletal     Abdominal    Substance History      OB/GYN          Other                      Anesthesia Plan    ASA 3     MAC       Anesthetic plan, risks, benefits, and alternatives have been provided, discussed and informed consent has been obtained with: patient.    CODE STATUS:

## 2024-10-25 NOTE — ANESTHESIA POSTPROCEDURE EVALUATION
Patient: Derik Borja    Procedure Summary       Date: 10/25/24 Room / Location: [facility] OR  INV / [facility] HYBRID OR    Anesthesia Start: 1135 Anesthesia Stop: 1300    Procedure: LEFT ILIAC ARTERY ANGIOPLASTY WITH EXTERNAL AND COMMON ILIAC STENT (Left: Thigh) Diagnosis:       Atherosclerosis of artery of extremity with ulceration      Atherosclerosis of native artery of left lower extremity with intermittent claudication      PAD (peripheral artery disease)      (Atherosclerosis of artery of extremity with ulceration [I70.299, L97.909])      (Atherosclerosis of native artery of left lower extremity with intermittent claudication [I70.212])      (PAD (peripheral artery disease) [I73.9])    Surgeons: Sukh Villatoro MD Provider: Jean Brush MD    Anesthesia Type: MAC ASA Status: 3            Anesthesia Type: MAC    Vitals  Vitals Value Taken Time   /83 10/25/24 1300   Temp 36.6 °C (97.9 °F) 10/25/24 1300   Pulse 67 10/25/24 1315   Resp 16 10/25/24 1300   SpO2 95 % 10/25/24 1315           Post Anesthesia Care and Evaluation    Pain management: adequate    Airway patency: patent  Anesthetic complications: No anesthetic complications    Cardiovascular status: acceptable  Respiratory status: acceptable  Hydration status: acceptable    Comments: /83 (BP Location: Right arm, Patient Position: Lying)   Pulse 67   Temp 36.6 °C (97.9 °F) (Oral)   Resp 16   SpO2 95%

## 2024-10-25 NOTE — OP NOTE
Operative Note  Date of Admission:  10/25/2024  OR Date: 10/25/2024    Pre-op Diagnosis:   Peripheral vascular disease with left lower extremity ulcer    Post-Op Diagnosis Codes:     * Atherosclerosis of artery of extremity with ulceration [I70.299, L97.909]     * Atherosclerosis of native artery of left lower extremity with intermittent claudication [I70.212]     * PAD (peripheral artery disease) [I73.9]    Procedure:   1) duplex directed access with right femoral approach, aortoiliofemoral arteriogram with left profunda femoris artery selective catheterization third order  2) left external iliac artery angioplasty and stent placement  3) left common iliac artery stent placement with angioplasty  4) right femoral Perclose closure device    Surgeon: Ean Villatoro MD    Assistant:  Amy NIEVES CSA and they provided critical assistance during the case including suctioning, exposure, retraction, and reduction of blood loss.    Anesthesia: Monitored Anesthesia Care    Staff:   Circulator: Melissa Hoyos RN  Radiology Technologist: Nighat Paris  Scrub Person: Lucille Jernigan; Baljinder Granados  Assistant: Amy Mejia CSA  Vascular Radiology Technician: Ramiro Jacques    Estimated Blood Loss: minimal    Specimens: * No orders in the log *     Complications: None    Findings: External iliac lesion separate from prior stent    Indications:    The patient is an 74 y.o. male seen for evaluation of peripheral vascular disease with lower extremity ulceration.  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  Patient and family understand risk benefits complications of the procedure and consent to the procedure.       Procedure:    Patient was prepped and draped in a sterile manner.  Using duplex directionally accessed the right common femoral artery and passed a wire centrally.  Pigtail was positioned at the level of the aorta where aortogram was performed and was pulled down and aortoiliofemoral  arteriogram was performed.  Because of patient motion artifact with heavy breathing and coughing we had difficulty imaging the iliacs and a RIM catheter was used to go right to left.  This proved to be somewhat difficult and after getting a wire down we exchanged through a Lopez cross catheter was which was used to select the distal profunda femoris branches to place the wire safely in that distance.  With this advantage Glidewire of the horn we placed a 6 [place] sheath over the horn and completed angiograms and then performed angioplasty of the left external iliac artery with a 7 mm x 40 mm balloon.  This relieved the stenosis but left significant dissection and this was stented with an 8 x 40 EV 3 stent.  Unfortunately because of motion artifact and difficulty seeing this was deployed in a way that put it at the edge of the distal common iliac artery and this created a concern for rubbing of the stents with likely restenosis coming.  Therefore 9 mm x 30 mm EV 3 stent was deployed in the left common iliac artery in order to bridge to the stents.  Once this was performed repeat angioplasty with a 7 mm balloon was performed and then final angiograms were warm.  A Perclose closure device was then used to close the right groin.  Patient tolerated procedure well.  He was given 3000 units of heparin intraoperatively and it was not reversed.      Radiographic Findings:  Angiographic findings include aortogram with suboptimal images due to patient's coughing and breathing throughout the case.  There is widely patent renal arteries SMA and celiac artery.  Both common iliac arteries are patent with a stent on the left.  The hypogastric on the left is occluded the right is widely patent.  There is no significant occlusive disease to the right external or common iliac arteries and the profunda femoral arteries to the outflow from a widely patent common femoral artery on the right.  Puncture site is good for closure device use.   On the left there is a high-grade 90% focal shelflike plaque within the external iliac artery otherwise through the external iliac artery common femoral and profunda femoris artery runoff on the left is widely patent with selective catheterization of the profundus seen.  Balloon angioplasty of the external iliac lesion resolves the lesion at nominal pressure but leaves significant dissection.  This is stented with an 8 x 40 EV 3 stent.  Due to imaging difficulties the stent did not overlap into the common iliac artery and left an area of concern at the common iliac artery for effective rubbing of the stents.  As such a 9 mm x 30 mm EV 3 stent was deployed in the common iliac artery to complete the bridge and prevent this.  Postdilatation with a 7 mm balloon of all sites was performed.  Final images show wide patency without restenosis.      Active Hospital Problems    Diagnosis  POA    Atherosclerosis of artery of extremity with ulceration [I70.299, L97.909]  Unknown    Atherosclerosis of native artery of extremity with intermittent claudication [I70.219]  Unknown    PAD (peripheral artery disease) [I73.9]  Unknown      Resolved Hospital Problems   No resolved problems to display.      Sukh Villatoro MD     Date: 10/25/2024  Time: 13:22 EDT

## 2024-10-25 NOTE — BRIEF OP NOTE
ANGIOPLASTY ILIAC ARTERY POSSIBLE STENT  Progress Note    Derik Borja  10/25/2024    Pre-op Diagnosis:   Atherosclerosis of artery of extremity with ulceration [I70.299, L97.909]  Atherosclerosis of native artery of left lower extremity with intermittent claudication [I70.212]  PAD (peripheral artery disease) [I73.9]       Post-Op Diagnosis Codes:     * Atherosclerosis of artery of extremity with ulceration [I70.299, L97.909]     * Atherosclerosis of native artery of left lower extremity with intermittent claudication [I70.212]     * PAD (peripheral artery disease) [I73.9]    Procedure/CPT® Codes:        Procedure(s):  LEFT ILIAC ARTERY ANGIOPLASTY WITH EXTERNAL AND COMMON ILIAC STENT              Surgeon(s):  Sukh Villatoro MD    Anesthesia: Monitored Anesthesia Care    Staff:   Circulator: Melissa Hoyos RN  Radiology Technologist: Nighat Paris  Scrub Person: Lucille Jernigan; Baljinder Granados  Assistant: Amy Mejia CSA  Vascular Radiology Technician: Ramiro Jacques  Assistant: Amy Mejia CSA      Estimated Blood Loss: minimal    Urine Voided: * No values recorded between 10/25/2024 11:35 AM and 10/25/2024 12:42 PM *    Specimens:                None          Drains: * No LDAs found *    Findings: see dict        Complications: none    Assistant: Amy Mejia CSA  was responsible for performing the following activities:  wire and catheter management  and their skilled assistance was necessary for the success of this case.    Sukh Villatoro MD     Date: 10/25/2024  Time: 12:44 EDT

## 2024-11-05 ENCOUNTER — OFFICE VISIT (OUTPATIENT)
Dept: SURGERY | Facility: CLINIC | Age: 74
End: 2024-11-05
Payer: MEDICARE

## 2024-11-05 VITALS
SYSTOLIC BLOOD PRESSURE: 140 MMHG | OXYGEN SATURATION: 99 % | DIASTOLIC BLOOD PRESSURE: 82 MMHG | HEIGHT: 71 IN | WEIGHT: 212.2 LBS | HEART RATE: 69 BPM | BODY MASS INDEX: 29.71 KG/M2

## 2024-11-05 DIAGNOSIS — R13.12 OROPHARYNGEAL DYSPHAGIA: ICD-10-CM

## 2024-11-05 DIAGNOSIS — Z12.11 SCREENING FOR COLON CANCER: ICD-10-CM

## 2024-11-05 DIAGNOSIS — K59.00 CONSTIPATION, UNSPECIFIED CONSTIPATION TYPE: ICD-10-CM

## 2024-11-05 DIAGNOSIS — R19.4 CHANGE IN BOWEL HABITS: Primary | ICD-10-CM

## 2024-11-05 NOTE — H&P (VIEW-ONLY)
General Surgery  Initial Office Visit    CC: Change in bowel habits dysphagia    HPI: The patient is a pleasant 74 y.o. year-old gentleman who presents today for evaluation of recent change in his bowel habits.  Over the last year he has struggled with fairly irregular bowel movements, tending more towards constipation.  He has started taking a daily stool softener and has noticed in the last 6 months his bowel movements become more regular again.  He will have a bowel movement every 1 to 2 days with the help of stool softeners.  He then noticed in August his bowel movement frequency slow down yet again and has occurred about once weekly despite ongoing stool softener use.  He has off-and-on abdominal cramping with no nausea, vomiting, melena, or hematochezia.  The cramping will only occur when he has gone many days without a bowel movement.  He has tried MiraLAX but found it causes uncontrollable diarrhea.  He underwent a colonoscopy in 1994 in [place].  He sees Dr. Ean Villatoro with vascular surgery and recently had a CTA of the abdomen that showed constipation.  He was referred to see me for possible colonoscopy.  He also endorses sporadic dysphagia that occurs daily primarily with pills and solid food but not with liquids.  He had dysphagia the other day when he was eating a peanut butter and jelly sandwich.  He feels as though food becomes stuck in his cervical esophagus and requires a few minutes before it will pass.  He has not regurgitated any undigested food.  Has never previously undergone upper endoscopy.    Past Medical History:   Peripheral vascular disease  Hyperlipidemia  Paroxysmal atrial fibrillation on anticoagulation  Chronic venous stasis with lower extremity ulcer  Varicose veins  Coronary artery disease  COPD    Past Surgical History:   Colonoscopy (1994, [facility])  Varicose vein surgery on left lower extremity  Left external/common iliac angioplasty with stent  Shoulder  surgery    Medications:   Eliquis 5 mg twice daily  Vitamin C 1000 mg daily  Atorvastatin 80 mg nightly  Plavix 75 mg nightly  Flexeril 10 mg nightly  Colace 100 mg daily  Fluticasone nasal spray as needed  Isosorbide mononitrate 60 mg nightly  Metoprolol succinate XL 50 mg daily  Multivitamin once daily  Sotalol 40 mg twice daily  Tramadol 50 mg nightly  Vitamin D3 5000 units daily  Nitroglycerin sublingual as needed    Allergies: Iodine contrast, penicillin    Family History: No family history of gastrointestinal malignancy, his brother had kidney cancer and coronary artery disease    Social History: , smokes 1/2 pack/day cigarettes for the last 35 years, social alcohol use, social marijuana use    ROS:   Constitutional: Negative for fevers or chills  HENT: Negative for hearing loss or runny nose  Eyes: Negative for vision changes or scleral icterus  Respiratory: Negative for cough or shortness of breath  Cardiovascular: Negative for chest pain or heart palpitations  Gastrointestinal: Positive for abdominal pain, dysphagia, and constipation; negative for abdominal distension, nausea, vomiting, diarrhea, melena, or hematochezia  Genitourinary: Negative for hematuria or dysuria  Musculoskeletal: Negative for joint swelling or gait instability  Neurologic: Negative for tremors or seizures  Psychiatric: Negative for suicidal ideations or agitation  All other systems reviewed and negative    Physical Exam:  Height: 180 cm  Weight: 96 kg  BMI: 29.15  General: No acute distress, well-nourished & well-developed  HEAD: normocephalic, atraumatic  EYES: normal conjunctiva, sclera anicteric  EARS: grossly normal hearing  NECK: supple, no thyromegaly  CARDIOVASCULAR: regular rate and rhythm  RESPIRATORY: clear to auscultation bilaterally  GASTROINTESTINAL: soft, nontender, non-distended  MUSCULOSKELETAL: normal gait and station. No gross extremity abnormalities  PSYCHIATRIC: oriented x3, normal mood and  affect    IMAGING:  CT ABDOMEN/PELVIS (10/3/2024):  CONCLUSION:  1. Peripheral vascular disease as discussed above.  2. Hepatic cysts. Likely constipation. No acute intra-abdominal abnormality.    ASSESSMENT & PLAN  Mr. Borja is a 74-year-old gentleman with a change in his bowel habits, constipation, and new onset cervical dysphagia.  I recommended proceeding with EGD and colonoscopy at his convenience to workup possible esophageal stricture, colonic stricture, colonic inertia, etc.  He was counseled on the risks of both upper and lower endoscopy and has consented to proceed.  He should hold his Eliquis for 3 days and hold his Plavix for 5 days beforehand.    Yanira Ballard MD  General, Robotic, and Endoscopic Surgery  [facility]    [address], [address]  [place] [address]  P: [phone]  F: [phone]

## 2024-11-05 NOTE — PROGRESS NOTES
General Surgery  Initial Office Visit    CC: Change in bowel habits dysphagia    HPI: The patient is a pleasant 74 y.o. year-old gentleman who presents today for evaluation of recent change in his bowel habits.  Over the last year he has struggled with fairly irregular bowel movements, tending more towards constipation.  He has started taking a daily stool softener and has noticed in the last 6 months his bowel movements become more regular again.  He will have a bowel movement every 1 to 2 days with the help of stool softeners.  He then noticed in August his bowel movement frequency slow down yet again and has occurred about once weekly despite ongoing stool softener use.  He has off-and-on abdominal cramping with no nausea, vomiting, melena, or hematochezia.  The cramping will only occur when he has gone many days without a bowel movement.  He has tried MiraLAX but found it causes uncontrollable diarrhea.  He underwent a colonoscopy in 1994 in [place].  He sees Dr. Ean Villatoro with vascular surgery and recently had a CTA of the abdomen that showed constipation.  He was referred to see me for possible colonoscopy.  He also endorses sporadic dysphagia that occurs daily primarily with pills and solid food but not with liquids.  He had dysphagia the other day when he was eating a peanut butter and jelly sandwich.  He feels as though food becomes stuck in his cervical esophagus and requires a few minutes before it will pass.  He has not regurgitated any undigested food.  Has never previously undergone upper endoscopy.    Past Medical History:   Peripheral vascular disease  Hyperlipidemia  Paroxysmal atrial fibrillation on anticoagulation  Chronic venous stasis with lower extremity ulcer  Varicose veins  Coronary artery disease  COPD    Past Surgical History:   Colonoscopy (1994, [facility])  Varicose vein surgery on left lower extremity  Left external/common iliac angioplasty with stent  Shoulder  surgery    Medications:   Eliquis 5 mg twice daily  Vitamin C 1000 mg daily  Atorvastatin 80 mg nightly  Plavix 75 mg nightly  Flexeril 10 mg nightly  Colace 100 mg daily  Fluticasone nasal spray as needed  Isosorbide mononitrate 60 mg nightly  Metoprolol succinate XL 50 mg daily  Multivitamin once daily  Sotalol 40 mg twice daily  Tramadol 50 mg nightly  Vitamin D3 5000 units daily  Nitroglycerin sublingual as needed    Allergies: Iodine contrast, penicillin    Family History: No family history of gastrointestinal malignancy, his brother had kidney cancer and coronary artery disease    Social History: , smokes 1/2 pack/day cigarettes for the last 35 years, social alcohol use, social marijuana use    ROS:   Constitutional: Negative for fevers or chills  HENT: Negative for hearing loss or runny nose  Eyes: Negative for vision changes or scleral icterus  Respiratory: Negative for cough or shortness of breath  Cardiovascular: Negative for chest pain or heart palpitations  Gastrointestinal: Positive for abdominal pain, dysphagia, and constipation; negative for abdominal distension, nausea, vomiting, diarrhea, melena, or hematochezia  Genitourinary: Negative for hematuria or dysuria  Musculoskeletal: Negative for joint swelling or gait instability  Neurologic: Negative for tremors or seizures  Psychiatric: Negative for suicidal ideations or agitation  All other systems reviewed and negative    Physical Exam:  Height: 180 cm  Weight: 96 kg  BMI: 29.15  General: No acute distress, well-nourished & well-developed  HEAD: normocephalic, atraumatic  EYES: normal conjunctiva, sclera anicteric  EARS: grossly normal hearing  NECK: supple, no thyromegaly  CARDIOVASCULAR: regular rate and rhythm  RESPIRATORY: clear to auscultation bilaterally  GASTROINTESTINAL: soft, nontender, non-distended  MUSCULOSKELETAL: normal gait and station. No gross extremity abnormalities  PSYCHIATRIC: oriented x3, normal mood and  affect    IMAGING:  CT ABDOMEN/PELVIS (10/3/2024):  CONCLUSION:  1. Peripheral vascular disease as discussed above.  2. Hepatic cysts. Likely constipation. No acute intra-abdominal abnormality.    ASSESSMENT & PLAN  Mr. Borja is a 74-year-old gentleman with a change in his bowel habits, constipation, and new onset cervical dysphagia.  I recommended proceeding with EGD and colonoscopy at his convenience to workup possible esophageal stricture, colonic stricture, colonic inertia, etc.  He was counseled on the risks of both upper and lower endoscopy and has consented to proceed.  He should hold his Eliquis for 3 days and hold his Plavix for 5 days beforehand.    Yanira Ballard MD  General, Robotic, and Endoscopic Surgery  [facility]    [address], [address]  [place] [address]  P: [phone]  F: [phone]

## 2024-11-14 ENCOUNTER — HOSPITAL ENCOUNTER (OUTPATIENT)
Facility: HOSPITAL | Age: 74
Setting detail: HOSPITAL OUTPATIENT SURGERY
Discharge: HOME | End: 2024-11-14
Attending: SURGERY | Admitting: SURGERY
Payer: MEDICARE

## 2024-11-14 ENCOUNTER — ANESTHESIA EVENT (OUTPATIENT)
Dept: GASTROENTEROLOGY | Facility: HOSPITAL | Age: 74
End: 2024-11-14
Payer: MEDICARE

## 2024-11-14 ENCOUNTER — ANESTHESIA (OUTPATIENT)
Dept: GASTROENTEROLOGY | Facility: HOSPITAL | Age: 74
End: 2024-11-14
Payer: MEDICARE

## 2024-11-14 VITALS
BODY MASS INDEX: 29.26 KG/M2 | HEIGHT: 71 IN | HEART RATE: 60 BPM | WEIGHT: 209 LBS | RESPIRATION RATE: 16 BRPM | DIASTOLIC BLOOD PRESSURE: 91 MMHG | SYSTOLIC BLOOD PRESSURE: 151 MMHG | OXYGEN SATURATION: 92 %

## 2024-11-14 DIAGNOSIS — Z12.11 SCREENING FOR COLON CANCER: ICD-10-CM

## 2024-11-14 DIAGNOSIS — R19.4 CHANGE IN BOWEL HABITS: ICD-10-CM

## 2024-11-14 DIAGNOSIS — K59.00 CONSTIPATION, UNSPECIFIED CONSTIPATION TYPE: ICD-10-CM

## 2024-11-14 DIAGNOSIS — R13.12 OROPHARYNGEAL DYSPHAGIA: ICD-10-CM

## 2024-11-14 PROBLEM — K25.3 ACUTE GASTRIC ULCER WITHOUT HEMORRHAGE OR PERFORATION: Status: ACTIVE | Noted: 2024-11-14

## 2024-11-14 PROBLEM — K29.80 DUODENITIS: Status: ACTIVE | Noted: 2024-11-14

## 2024-11-14 PROBLEM — K29.00 ACUTE SUPERFICIAL GASTRITIS WITHOUT HEMORRHAGE: Status: ACTIVE | Noted: 2024-11-14

## 2024-11-14 PROBLEM — K63.5 COLON POLYPS: Status: ACTIVE | Noted: 2024-11-14

## 2024-11-14 PROCEDURE — 25810000003 LACTATED RINGERS PER 1000 ML: Performed by: SURGERY

## 2024-11-14 PROCEDURE — 45380 COLONOSCOPY AND BIOPSY: CPT | Performed by: SURGERY

## 2024-11-14 PROCEDURE — 88305 TISSUE EXAM BY PATHOLOGIST: CPT | Performed by: SURGERY

## 2024-11-14 PROCEDURE — 43239 EGD BIOPSY SINGLE/MULTIPLE: CPT | Performed by: SURGERY

## 2024-11-14 PROCEDURE — 45385 COLONOSCOPY W/LESION REMOVAL: CPT | Performed by: SURGERY

## 2024-11-14 PROCEDURE — 25010000002 GLYCOPYRROLATE 0.2 MG/ML SOLUTION: Performed by: ANESTHESIOLOGY

## 2024-11-14 PROCEDURE — 25010000002 LIDOCAINE 2% SOLUTION: Performed by: ANESTHESIOLOGY

## 2024-11-14 PROCEDURE — 25010000002 PROPOFOL 1000 MG/100ML EMULSION: Performed by: ANESTHESIOLOGY

## 2024-11-14 RX ORDER — GLYCOPYRROLATE 0.2 MG/ML
INJECTION INTRAMUSCULAR; INTRAVENOUS AS NEEDED
Status: DISCONTINUED | OUTPATIENT
Start: 2024-11-14 | End: 2024-11-14 | Stop reason: SURG

## 2024-11-14 RX ORDER — PROPOFOL 10 MG/ML
INJECTION, EMULSION INTRAVENOUS AS NEEDED
Status: DISCONTINUED | OUTPATIENT
Start: 2024-11-14 | End: 2024-11-14 | Stop reason: SURG

## 2024-11-14 RX ORDER — PANTOPRAZOLE SODIUM 40 MG/1
40 TABLET, DELAYED RELEASE ORAL DAILY
Qty: 60 TABLET | Refills: 5 | Status: SHIPPED | OUTPATIENT
Start: 2024-11-14 | End: 2025-11-14

## 2024-11-14 RX ORDER — SODIUM CHLORIDE, SODIUM LACTATE, POTASSIUM CHLORIDE, CALCIUM CHLORIDE 600; 310; 30; 20 MG/100ML; MG/100ML; MG/100ML; MG/100ML
20 INJECTION, SOLUTION INTRAVENOUS CONTINUOUS
Status: DISCONTINUED | OUTPATIENT
Start: 2024-11-14 | End: 2024-11-14 | Stop reason: HOSPADM

## 2024-11-14 RX ORDER — LIDOCAINE HYDROCHLORIDE 20 MG/ML
INJECTION, SOLUTION INFILTRATION; PERINEURAL AS NEEDED
Status: DISCONTINUED | OUTPATIENT
Start: 2024-11-14 | End: 2024-11-14 | Stop reason: SURG

## 2024-11-14 RX ADMIN — GLYCOPYRROLATE 0.2 MG: 0.2 INJECTION INTRAMUSCULAR; INTRAVENOUS at 09:18

## 2024-11-14 RX ADMIN — SODIUM CHLORIDE, POTASSIUM CHLORIDE, SODIUM LACTATE AND CALCIUM CHLORIDE 20 ML/HR: 600; 310; 30; 20 INJECTION, SOLUTION INTRAVENOUS at 08:35

## 2024-11-14 RX ADMIN — LIDOCAINE HYDROCHLORIDE 100 MG: 20 INJECTION, SOLUTION INFILTRATION; PERINEURAL at 09:25

## 2024-11-14 RX ADMIN — PROPOFOL INJECTABLE EMULSION 650 MG: 10 INJECTION, EMULSION INTRAVENOUS at 09:27

## 2024-11-14 NOTE — ANESTHESIA PREPROCEDURE EVALUATION
Anesthesia Evaluation     Patient summary reviewed and Nursing notes reviewed   NPO Solid Status: > 8 hours             Airway   Mallampati: II  Dental      Pulmonary    (+) a smoker Current, Abstained day of surgery, COPD,  (-) sleep apnea    ROS comment: Negative patient screen for FEDERICO    Cardiovascular     (+) hypertension, dysrhythmias Paroxysmal Atrial Fib, PVD      Neuro/Psych  GI/Hepatic/Renal/Endo    (+) obesity    Musculoskeletal     Abdominal    Substance History      OB/GYN          Other                          Anesthesia Plan    ASA 3     MAC       Anesthetic plan, risks, benefits, and alternatives have been provided, discussed and informed consent has been obtained with: patient.      CODE STATUS:

## 2024-11-14 NOTE — OP NOTE
Operative Note :  Yanira Ballard MD      Derik Nixdt  1950    Procedure Date: 11/14/24    Pre-op Diagnosis:  Change in bowel habits [R19.4]  Constipation, unspecified constipation type [K59.00]  Oropharyngeal dysphagia [R13.12]  Screening for colon cancer [Z12.11]    Post-Operative Diagnosis:  Gastritis with nonbleeding gastric ulcer  Duodenitis of the duodenal bulb  Bowel prep  Colon polyps    Procedure:   Esophagogastroduodenoscopy with biopsy  Flexible colonoscopy to the proximal ascending colon with hot snare polypectomy and cold forcep polypectomy x 22    Surgeon: Yanira Ballard MD    Assistant: None    Anesthesia:  MAC (monitored anesthetic care)    Estimated Blood Loss: Minimal    Specimens:   Duodenal biopsy  Antral biopsy  GE junction biopsy  Transverse colon polyps x 5  Sigmoid colon polyps x 3  Rectal polyp #1  Rectal polyps #2 - #15    Complications: None    Indications:  The patient is a 74-year gentleman who came to see me recently with oropharyngeal dysphagia and constipation with a recent change in his bowel habits.  I have recommended proceeding with EGD and colonoscopy today.  He has been counseled on the risks of the procedure and has consented to proceed.    Findings:   EGD: Significant antral gastritis and duodenitis of duodenal bulb with nonbleeding 5 mm gastric ulcer  Colonoscopy: 23 colon polyps removed, poor prep, unable to traverse beyond the ileocecal valve due to tortuosity of the colon    Description of procedure:  The patient was brought to the endoscopy suite and enoch in the left lateral decubitus position.  A bite-block was inserted into the oropharynx.  Continuous propofol anesthesia was administered.  A surgical timeout was completed.  An upper endoscope was passed through the bite-block to the posterior oropharynx where the vocal cords and epiglottis were grossly normal.  The cervical esophagus was cannulated and the scope passed onward through the full length of the  esophagus noting no evidence for esophagitis or esophageal stricture.  The GE junction was located at the level of the diaphragmatic hiatus showing no evidence of a hiatal hernia.  The Z-line was relatively unremarkable.  The stomach was entered and maximally insufflated, showing streaks of erythematous mucosa predominantly throughout the entire gastric antrum.  There was a cratered 5 mm ulcer within the gastric antrum that was nonbleeding.  The scope was then passed through the pylorus into the duodenal bulb where there were patches of erythematous mucosa consistent with duodenitis.  The scope was then passed into the second and third portion of the duodenum's which were unremarkable.  The scope was retracted back to the duodenal bulb where mucosal biopsies were obtained.  The scope was then retracted to the level of the gastric antrum where mucosal biopsies were obtained to test for H. pylori.  The scope was retroflexed within the gastric antrum and showed no evidence for a hiatal hernia at the diaphragmatic hiatus.  There was no abnormality of the gastric cardia or fundus.  The scope was then slowly withdrawn back to the GE junction where mucosal biopsies were obtained to test for Tejeda's esophagus.  The scope was then slowly withdrawn out of the esophagus and mouth, noting no further abnormalities.  He was then repositioned for colonoscopy.  A digital rectal exam was performed, revealing no abnormalities.  An adult colonoscope was then inserted through the anus and passed under direct visualization to the level of the ileocecal valve.  I was unable to traverse all the way down into the cecal bowl due to tortuosity of the colon and looping of the scope.  Manual abdominal wall pressure was applied in multiple locations and he was repositioned on his back, but I was still unable to traverse beyond the ileocecal valve. The scope was then slowly withdrawn, examining all circumferential walls of the ascending,  transverse, descending, and sigmoid colon.  He had an extremely poor bowel prep with significant liquid and solid stool with undigested food matter throughout the colon.  About 2 L of saline irrigation was lavaged throughout the colon and suctioned to allow for adequate visualization.  He had 23 colon polyps that were identified and removed.  5 of these were located in the transverse colon all measuring between 2 and 3 mm in diameter.  These were removed using the biopsy forceps.  There were 3 similar sized colon polyps of the sigmoid colon also removed using the biopsy forceps.  Within the rectum there was a 9 mm pedunculated polyp removed using the hot snare.  There were 14 other tiny hyperplastic appearing polyps throughout the rectum each measuring about 2 mm in diameter.  All of these were removed using the biopsy forceps.  Within the rectum the scope was retroflexed and showed no evidence of hemorrhoidal disease.  The scope was then withdrawn and the colon desufflated.  The patient had a very good bowel prep and was transferred to the recovery area in stable condition.     Recommendations:  I will call the patient in 1 week or less with the pathology results of the 23 colon polyps removed as this will determine when the next screening colonoscopy will be due.  I will also discuss the upper endoscopy biopsies obtained today and any further recommendations that may follow.    Yanira Ballard MD  General, Robotic, and Endoscopic Surgery  [facility]    [address], [address]  [place] [address]  P: [phone]  F: [phone]

## 2024-11-14 NOTE — DISCHARGE INSTRUCTIONS
For the next 24 hours patient needs to be with a responsible adult.    For 24 hours DO NOT drive, operate machinery, appliances, drink alcohol, make important decisions or sign legal documents.    Start with a light or bland diet if you are feeling sick to your stomach otherwise advance to regular diet as tolerated.    Follow recommendations on procedure report if provided by your doctor.    Call Dr Ballard for problems [phone]    Restart Eliquis and Plavix on  Sunday 11-17-24    Problems may include but not limited to: large amounts of bleeding, trouble breathing, repeated vomiting, severe unrelieved pain, fever or chills.

## 2024-11-14 NOTE — ANESTHESIA POSTPROCEDURE EVALUATION
"Patient: Derik Borja    Procedure Summary       Date: 11/14/24 Room / Location:  [facility]    Anesthesia Start: 0917 Anesthesia Stop: 1047    Procedures:       ESOPHAGOGASTRODUODENOSCOPY with bx (Esophagus)      COLONOSCOPY to ascending colon with cold bx/hot snare polypectomies Diagnosis:       Change in bowel habits      Constipation, unspecified constipation type      Oropharyngeal dysphagia      Screening for colon cancer      (Change in bowel habits [R19.4])      (Constipation, unspecified constipation type [K59.00])      (Oropharyngeal dysphagia [R13.12])      (Screening for colon cancer [Z12.11])    Surgeons: Yanira Ballard MD Provider: Donovan Brown MD    Anesthesia Type: MAC ASA Status: 3            Anesthesia Type: MAC    Vitals  Vitals Value Taken Time   /91 11/14/24 1110   Temp     Pulse 60 11/14/24 1117   Resp 16 11/14/24 1105   SpO2 97 % 11/14/24 1117   Vitals shown include unfiled device data.        Post Anesthesia Care and Evaluation    Patient location during evaluation: bedside  Patient participation: complete - patient participated  Level of consciousness: awake and alert  Pain management: adequate    Airway patency: patent  Anesthetic complications: No anesthetic complications    Cardiovascular status: acceptable  Respiratory status: acceptable  Hydration status: acceptable    Comments: /91   Pulse 60   Resp 16   Ht 180.3 cm (71\")   Wt 94.8 kg (209 lb)   SpO2 92%   BMI 29.15 kg/m²     "

## 2024-11-15 LAB
CYTO UR: NORMAL
LAB AP CASE REPORT: NORMAL
PATH REPORT.FINAL DX SPEC: NORMAL
PATH REPORT.GROSS SPEC: NORMAL

## 2024-11-19 ENCOUNTER — TELEPHONE (OUTPATIENT)
Dept: CARDIOLOGY | Facility: CLINIC | Age: 74
End: 2024-11-19
Payer: MEDICARE

## 2024-11-19 RX ORDER — NITROGLYCERIN 0.4 MG/1
TABLET SUBLINGUAL
Qty: 25 TABLET | Refills: 0 | Status: SHIPPED | OUTPATIENT
Start: 2024-11-19

## 2024-12-11 RX ORDER — SOTALOL HYDROCHLORIDE 80 MG/1
40 TABLET ORAL 2 TIMES DAILY
Qty: 90 TABLET | Refills: 0 | Status: SHIPPED | OUTPATIENT
Start: 2024-12-11

## 2024-12-13 ENCOUNTER — HOSPITAL ENCOUNTER (OUTPATIENT)
Facility: HOSPITAL | Age: 74
Discharge: HOME | End: 2024-12-13
Payer: MEDICARE

## 2024-12-13 DIAGNOSIS — I70.212 ATHEROSCLEROSIS OF NATIVE ARTERY OF LEFT LOWER EXTREMITY WITH INTERMITTENT CLAUDICATION: ICD-10-CM

## 2024-12-13 DIAGNOSIS — I73.9 PAD (PERIPHERAL ARTERY DISEASE): ICD-10-CM

## 2024-12-13 PROCEDURE — 93922 UPR/L XTREMITY ART 2 LEVELS: CPT

## 2024-12-15 LAB
BH CV LOWER ARTERIAL LEFT ABI RATIO: 0.73
BH CV LOWER ARTERIAL LEFT DORSALIS PEDIS SYS MAX: 82
BH CV LOWER ARTERIAL LEFT POST TIBIAL SYS MAX: 96
BH CV LOWER ARTERIAL RIGHT ABI RATIO: 0.71
BH CV LOWER ARTERIAL RIGHT DORSALIS PEDIS SYS MAX: 80
BH CV LOWER ARTERIAL RIGHT POST TIBIAL SYS MAX: 94
UPPER ARTERIAL LEFT ARM BRACHIAL SYS MAX: NORMAL
UPPER ARTERIAL RIGHT ARM BRACHIAL SYS MAX: NORMAL

## 2024-12-16 ENCOUNTER — TELEPHONE (OUTPATIENT)
Dept: SURGERY | Facility: CLINIC | Age: 74
End: 2024-12-16
Payer: MEDICARE

## 2024-12-16 DIAGNOSIS — K76.9 LESION OF LIVER: Primary | ICD-10-CM

## 2024-12-16 NOTE — TELEPHONE ENCOUNTER
I called Derik and discussed the benign pathology findings from his EGD and colonoscopy.  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  There was no evidence for H. pylori.  I would recommend he take Protonix once daily and we repeat an EGD in the future.  Also, his colonoscopy was significant for 23 polyps, 11 of which returned adenomatous and the remainder were hyperplastic.  I would recommend he needs a repeat screening colonoscopy in 2 years and I would be happy to perform a repeat EGD at that time for monitoring of his Tejeda's esophagus.  He expressed understanding.    Meghna, please update his health maintenance tab to reflect a recall pool of 2 years for both EGD and colonoscopy.    Lastly, he spoke to his primary care provider about his recent CTA of the abdomen that showed a few hepatic cysts.  Because there was no prior CT of the abdomen to compare to, she recommended a repeat CT scan in 6 months but was deferring ordering of that to either myself or Dr. Ean Kay with vascular surgery.  I would be happy to order a repeat CT scan in 6 months to monitor the liver lesions.  The patient expressed understanding

## 2024-12-19 ENCOUNTER — OFFICE VISIT (OUTPATIENT)
Age: 74
End: 2024-12-19
Payer: MEDICARE

## 2024-12-19 VITALS
DIASTOLIC BLOOD PRESSURE: 82 MMHG | OXYGEN SATURATION: 95 % | WEIGHT: 215.9 LBS | BODY MASS INDEX: 30.23 KG/M2 | HEIGHT: 71 IN | TEMPERATURE: 97.6 F | SYSTOLIC BLOOD PRESSURE: 125 MMHG | HEART RATE: 86 BPM | RESPIRATION RATE: 16 BRPM

## 2024-12-19 DIAGNOSIS — I87.8 VENOUS STASIS: ICD-10-CM

## 2024-12-19 DIAGNOSIS — I87.2 VENOUS INSUFFICIENCY: ICD-10-CM

## 2024-12-19 DIAGNOSIS — I70.212 ATHEROSCLEROSIS OF NATIVE ARTERY OF LEFT LOWER EXTREMITY WITH INTERMITTENT CLAUDICATION: ICD-10-CM

## 2024-12-19 DIAGNOSIS — L03.116 CELLULITIS OF LEFT LEG: ICD-10-CM

## 2024-12-19 DIAGNOSIS — I70.299 ATHEROSCLEROSIS OF ARTERY OF EXTREMITY WITH ULCERATION: Primary | ICD-10-CM

## 2024-12-19 DIAGNOSIS — I70.0 ATHEROSCLEROSIS OF AORTA: ICD-10-CM

## 2024-12-19 DIAGNOSIS — I73.9 PAD (PERIPHERAL ARTERY DISEASE): ICD-10-CM

## 2024-12-19 DIAGNOSIS — L97.909 ATHEROSCLEROSIS OF ARTERY OF EXTREMITY WITH ULCERATION: Primary | ICD-10-CM

## 2024-12-19 DIAGNOSIS — I87.302 VENOUS HYPERTENSION OF LEFT LOWER EXTREMITY: ICD-10-CM

## 2024-12-19 RX ORDER — DOXYCYCLINE 100 MG/1
100 CAPSULE ORAL 2 TIMES DAILY
Qty: 20 CAPSULE | Refills: 5 | Status: SHIPPED | OUTPATIENT
Start: 2024-12-19

## 2024-12-19 NOTE — PROGRESS NOTES
Patient Name: Derik Borja    MRN: [id number] Encounter Date: 12/19/2024      Consulting Service: Vascular Surgery    Referring Provider: No ref. provider found       CHIEF COMPLAINT:  Chief Complaint   Patient presents with    Peripheral Vascular Disease       Subjective    HPI: Derik Borja is a 74 y.o. male is being seen for evaluation/management of peripheral vascular disease follow-up.  The patient has known peripheral vascular disease with symptoms of ulceration.  Current symptoms of claudication at 3 blocks distance are noted without lifestyle limitation.  Prior interventions include peripheral endovascular intervention.  Currently the patient reports symptoms are improved.  Testing today includes ABIs.  Their current medical regimen includes antiplatelet and anticoagulation medications.  The patient appears to have normal sensation.  Discussion as to the protection of feet and toes with prevention of injury including twice a day monitoring of feet and all digits, avoidance of walking barefoot, and the use of lightly colored socks to note any drainage were reviewed with the patient.  Plan moving forward will include continue to follow-up with noninvasive testing.    PAST MEDICAL HISTORY:   Past Medical History:   Diagnosis Date    Afib 07/06/2023    APC (atrial premature contractions) 07/21/2023    1.5% APC burden noted on Holter monitor 7/2023    Arthritis     Atherosclerosis of native arteries of the extremities with ulceration 03/31/2022    PVD-right calf    Atherosclerosis of native arteries of the extremities with ulceration     PVD-left calf    Atherosclerosis of native arteries of the extremities with ulceration 04/10/2023    other part of foot    Bunion of right foot 06/19/2023    CAD (coronary artery disease)     3/13    Cellulitis of left lower extremity 01/05/2023    Cellulitis of right lower extremity 01/05/2023    COPD (chronic obstructive pulmonary disease)     Foot pain, right 04/10/2023     History of MRSA infection 2021    LEFT LEG    Kiana (hard of hearing)     Hyperlipidemia     PAD (peripheral artery disease)     PAF (paroxysmal atrial fibrillation) 07/2023    Pneumonia 05/20/2019    PVC (premature ventricular contraction)     Sinus pause 07/21/2023    For postconversion pauses noted after atrial fibrillation on Holter monitor 7/2023    Varicose veins of bilateral lower extremities with other complications 03/31/2022    Varicose veins of left lower extremity with inflammation 07/06/2022    Venous insufficiency (chronic) (peripheral) 03/31/2022    Venous ulcer 03/31/2022    Wound of foot     LEFT/SURGEON AWARE      PAST SURGICAL HISTORY:   Past Surgical History:   Procedure Laterality Date    ANGIOPLASTY ILIAC ARTERY Left 10/25/2024    Procedure: LEFT ILIAC ARTERY ANGIOPLASTY WITH EXTERNAL AND COMMON ILIAC STENT;  Surgeon: Sukh Villatoro MD;  Location: [facility] OR;  Service: Vascular;  Laterality: Left;    COLONOSCOPY N/A 11/14/2024    Procedure: COLONOSCOPY to ascending colon with cold bx/hot snare polypectomies;  Surgeon: Yanira Ballard MD;  Location: [facility] ENDOSCOPY;  Service: General;  Laterality: N/A;  pre: change in bowel habits, constipation   post: poor prep, polyps    ENDOSCOPY N/A 11/14/2024    Procedure: ESOPHAGOGASTRODUODENOSCOPY with bx;  Surgeon: Yanira Ballard MD;  Location: [facility] ENDOSCOPY;  Service: General;  Laterality: N/A;  pre: dysphagia  post: gastritis, gastric ulcer, duodenitis    ILIAC ARTERY STENT  03/2013    PERIPHERAL ARTERIAL STENT GRAFT      about 9 years ago    SHOULDER SURGERY  2013    VARICOSE VEIN SURGERY Left 05/02/2022    Left GSV Venaseal Ablation    VARICOSE VEIN SURGERY Left 08/01/2022    Left Tributary Vein Varithena ablation    VARICOSE VEIN SURGERY Left 09/23/2024    Left VV Varithena    VENOUS ABLATION CHEMICAL  09/23/2024      FAMILY HISTORY:   Family History   Problem Relation Age of Onset    Stroke Mother     Heart disease Mother      Emphysema Father     Heart disease Brother     Kidney cancer Brother     Malig Hyperthermia Neg Hx       SOCIAL HISTORY:   Social History     Tobacco Use    Smoking status: Every Day     Current packs/day: 0.50     Average packs/day: 0.5 packs/day for 35.0 years (17.5 ttl pk-yrs)     Types: Cigarettes     Passive exposure: Current    Smokeless tobacco: Never    Tobacco comments:     caffeine use - 3 cups coffee daily     Pt smokes 5-6 cigarettes a day    Vaping Use    Vaping status: Never Used   Substance Use Topics    Alcohol use: Yes     Comment: once a month - rarely    Drug use: Yes     Types: Marijuana     Comment: 2-3 X PER WEEK      MEDICATIONS:   Current Outpatient Medications on File Prior to Visit   Medication Sig Dispense Refill    apixaban (ELIQUIS) 5 MG tablet tablet Take 1 tablet by mouth 2 (Two) Times a Day. 42 tablet 0    Ascorbic Acid (VITAMIN C PO) Take 1,000 mg by mouth Daily.      atorvastatin (LIPITOR) 80 MG tablet TAKE 1 TABLET BY MOUTH ONCE DAILY AT NIGHT 90 tablet 3    clopidogrel (PLAVIX) 75 MG tablet Take 1 tablet by mouth once daily (Patient taking differently: Take 1 tablet by mouth Every Night. PT STATED TO CONTINUE PER MD INSTRUCTIONS) 90 tablet 1    cyclobenzaprine (FLEXERIL) 10 MG tablet Take 1 tablet by mouth Every Night.      Docusate Sodium (COLACE PO) Take 1 tablet by mouth Every Morning.      fluticasone (FLONASE) 50 MCG/ACT nasal spray Administer 2 sprays into the nostril(s) as directed by provider As Needed.      isosorbide mononitrate (IMDUR) 30 MG 24 hr tablet Take 2 tablets by mouth once daily (Patient taking differently: Take 2 tablets by mouth Every Night.) 180 tablet 0    metoprolol succinate XL (TOPROL-XL) 50 MG 24 hr tablet Take 1 tablet by mouth Daily.      Multiple Vitamin (MULTIVITAMIN) tablet Take 1 tablet by mouth Daily.      nitroglycerin (NITROSTAT) 0.4 MG SL tablet DISSOLVE ONE TABLET UNDER THE TONGUE EVERY 5 MINUTES AS NEEDED FOR CHEST PAIN.  DO NOT EXCEED A  "TOTAL OF 3 DOSES IN 15 MINUTES 25 tablet 0    pantoprazole (Protonix) 40 MG EC tablet Take 1 tablet by mouth Daily. 60 tablet 5    sotalol (BETAPACE AF) 80 MG tablet tablet Take 1/2 (one-half) tablet by mouth twice daily 90 tablet 0    traMADol (ULTRAM) 50 MG tablet Take 2 tablets by mouth Every Night.      vitamin D3 125 MCG (5000 UT) capsule capsule Take 1 capsule by mouth Daily.       No current facility-administered medications on file prior to visit.       ALLERGIES: Iodinated contrast media, Iodine, and Penicillins       Objective   Vitals:    12/19/24 0843   BP: 125/82   Pulse: 86   Resp: 16   Temp: 97.6 °F (36.4 °C)   TempSrc: Temporal   SpO2: 95%   Weight: 97.9 kg (215 lb 14.4 oz)   Height: 180.3 cm (70.98\")     Body mass index is 30.13 kg/m².          PHYSICAL EXAM:   Physical Exam  Constitutional:       Appearance: Normal appearance. He is normal weight.   HENT:      Head: Normocephalic and atraumatic.      Nose: Nose normal.   Eyes:      Extraocular Movements: Extraocular movements intact.      Pupils: Pupils are equal, round, and reactive to light.   Cardiovascular:      Rate and Rhythm: Normal rate.      Pulses:           Carotid pulses are 2+ on the right side and 2+ on the left side.       Radial pulses are 2+ on the right side and 2+ on the left side.        Femoral pulses are 2+ on the right side and 2+ on the left side.       Popliteal pulses are 0 on the right side and 0 on the left side.        Dorsalis pedis pulses are detected w/ Doppler on the right side and detected w/ Doppler on the left side.        Posterior tibial pulses are detected w/ Doppler on the right side and detected w/ Doppler on the left side.      Heart sounds: Normal heart sounds.      Comments: Left leg with moderate edema but new injury to the skin that I will consider more skin injury than a true ulcer at this time.  No true cellulitis in the area.  Area is approximately 2 x 3 cm of very superficial skin tear.  Pulmonary: "      Effort: Pulmonary effort is normal.      Breath sounds: Normal breath sounds.   Abdominal:      General: Abdomen is flat. Bowel sounds are normal.      Palpations: Abdomen is soft.   Musculoskeletal:         General: Normal range of motion.      Cervical back: Normal range of motion and neck supple.      Right lower leg: No edema.      Left lower leg: No edema.   Skin:     General: Skin is warm and dry.   Neurological:      General: No focal deficit present.      Mental Status: He is alert and oriented to person, place, and time. Mental status is at baseline.   Psychiatric:         Mood and Affect: Mood normal.         Thought Content: Thought content normal.          Result Review   LABS:    CBC          10/18/2024    07:26   CBC   WBC 7.87    RBC 4.07    Hemoglobin 13.3    Hematocrit 39.7    MCV 97.5    MCH 32.7    MCHC 33.5    RDW 11.8    Platelets 162      BMP          10/18/2024    07:26   BMP   BUN 16    Creatinine 0.96    Sodium 138    Potassium 4.1    Chloride 105    CO2 26.5    Calcium 9.5                 Results Review:       I reviewed the patient's new clinical results.    The following radiologic or non-invasive studies have been reviewed by me: ABIs reviewed with images reviewed with the patient  Doppler Ankle Brachial Index Single Level CAR 12/13/2024    Interpretation Summary    Right Conclusion: The right FARNAZ is moderately reduced.    Left Conclusion: The left FARNAZ is moderately reduced.     No radiology results for the last 30 days.                ASSESSMENT/PLAN:   Diagnoses and all orders for this visit:    1. Atherosclerosis of artery of extremity with ulceration (Primary)  -     Doppler Ankle Brachial Index Single Level CAR; Future  -     Duplex Aorta IVC Iliac Graft Limited CAR; Future    2. Venous stasis    3. Venous hypertension of left lower extremity    4. Venous insufficiency    5. PAD (peripheral artery disease)  -     Doppler Ankle Brachial Index Single Level CAR; Future  -      Duplex Aorta IVC Iliac Graft Limited CAR; Future    6. Atherosclerosis of native artery of left lower extremity with intermittent claudication  -     Doppler Ankle Brachial Index Single Level CAR; Future  -     Duplex Aorta IVC Iliac Graft Limited CAR; Future    7. Cellulitis of left leg    8. Atherosclerosis of aorta  -     Duplex Aorta IVC Iliac Graft Limited CAR; Future    Other orders  -     doxycycline (VIBRAMYCIN) 100 MG capsule; Take 1 capsule by mouth 2 (Two) Times a Day.  Dispense: 20 capsule; Refill: 5       74 y.o. male with improved leg after intervention at the iliac level and improved ABIs walking is better 2.  If anything his right leg is slowing him down now more than left.  He did have a recent injury to the shin while doing some work that surprises me as he is already wearing 2 layers of stockings and and trousers but is still slough some skin.  I believe will heal and so to see.  He did have a prior episode of cellulitis in that leg that was treated with his doxycycline and I am refilling that Doxy right now for another several rounds as needed.  He is confident he can get this to heal in a very looks very healthy.  I think it will heal and if it does not he can call us back.  Otherwise we will cancel his January appointment and move it 6 months for iliac scans and his ABIs.  Continue his anticoagulation and we will see him back then.    I discussed the plan with the patient who is agreeable to the plan of care at this point. Thank you for this consult.   Follow Up  Return in about 6 months (around 6/19/2025).    Sukh Villatoro MD   12/19/24

## 2025-01-07 RX ORDER — ISOSORBIDE MONONITRATE 30 MG/1
TABLET, EXTENDED RELEASE ORAL
Qty: 180 TABLET | Refills: 0 | Status: SHIPPED | OUTPATIENT
Start: 2025-01-07

## 2025-01-07 NOTE — TELEPHONE ENCOUNTER
NOV-02/11/25-EE  LOV-08/08/24-    PAD, s/p left iliac stent.  Follows with Dr. Villatoro.   CAD with diagonal stenosis. On asa and plavix, isosorbide for angina.  His angina currently is stable.  Hyperlipidemia.  On atorvastatin.  Tobacco use. I advised him to stop smoking.   Varicose veins with chronic venous insufficiency and venous stasis ulcers in BLE, left leg being worse with chronic Unna boot use.  Follows with Dr. Villatoro.     PACs.  On metoprolol  MRSA in leg wound.  Has had bilateral leg wounds, the right leg is healed up, the left leg has a Unna boot.  He follows in wound care and with Dr. Villatoro   PAF, on apixaban.     Plan:       No changes, see Catrina in 6 months, overall stable cardiac status, has not recently noticed atrial fibrillation.  No other testing at this time.

## 2025-02-07 ENCOUNTER — TELEPHONE (OUTPATIENT)
Dept: CARDIOLOGY | Facility: CLINIC | Age: 75
End: 2025-02-07
Payer: MEDICARE

## 2025-02-07 NOTE — TELEPHONE ENCOUNTER
Patient called requesting samples of Eliquis 5 mg to be picked up in the MAIN office.    CB: [phone]    Thanks,  Woody

## 2025-02-11 ENCOUNTER — OFFICE VISIT (OUTPATIENT)
Dept: CARDIOLOGY | Facility: CLINIC | Age: 75
End: 2025-02-11
Payer: MEDICARE

## 2025-02-11 VITALS
BODY MASS INDEX: 30.44 KG/M2 | HEART RATE: 73 BPM | HEIGHT: 71 IN | DIASTOLIC BLOOD PRESSURE: 82 MMHG | OXYGEN SATURATION: 99 % | SYSTOLIC BLOOD PRESSURE: 122 MMHG | WEIGHT: 217.4 LBS

## 2025-02-11 DIAGNOSIS — I25.119 CORONARY ARTERY DISEASE INVOLVING NATIVE CORONARY ARTERY OF NATIVE HEART WITH ANGINA PECTORIS: Primary | ICD-10-CM

## 2025-02-11 DIAGNOSIS — R09.89 BRUIT OF RIGHT CAROTID ARTERY: ICD-10-CM

## 2025-02-11 DIAGNOSIS — E78.2 MIXED HYPERLIPIDEMIA: ICD-10-CM

## 2025-02-11 DIAGNOSIS — I73.9 PAD (PERIPHERAL ARTERY DISEASE): ICD-10-CM

## 2025-02-11 PROCEDURE — 1159F MED LIST DOCD IN RCRD: CPT | Performed by: FAMILY MEDICINE

## 2025-02-11 PROCEDURE — 93000 ELECTROCARDIOGRAM COMPLETE: CPT | Performed by: FAMILY MEDICINE

## 2025-02-11 PROCEDURE — 99214 OFFICE O/P EST MOD 30 MIN: CPT | Performed by: FAMILY MEDICINE

## 2025-02-11 PROCEDURE — 1160F RVW MEDS BY RX/DR IN RCRD: CPT | Performed by: FAMILY MEDICINE

## 2025-02-11 NOTE — PROGRESS NOTES
Date of Office Visit: 2025  Encounter Provider: NIURKA Jin  Place of Service: [facility] CARDIOLOGY  Established cardiologist: Ani Kam MD  Patient Name: Derik Borja  :1950      Patient ID:  Derik Borja is a 74 y.o. male is here for  followup    With a pertinent medical history of hypertension, PAD with common iliac stent-history of ulcers lower extremities, hyperlipidemia, history of premature atrial complexes, tobacco use, PAF and COPD.   Lives with his wife Holley.  Been smoking over a pack a day for more than 20 years.     History of Present Illness  In  completed an echocardiogram that showed ejection fraction 55 to 60%, calcified aortic valve, and trace mitral insufficiency.  Lexiscan stress nuclear study at that time showed small amount of inferior ischemia.  He underwent cardiac catheterization 3/12/2013 which showed diffuse LAD stenosis, 70% first diagonal stenosis, 30% mid circumflex stenosis, and 30% right coronary artery stenosis.  It was also noted he had significant disease to the left common iliac artery with 90% stenosis, right common iliac had 30% stenosis.  The right internal iliac artery was patent and the left internal iliac artery was occluded.  Superficial femoral artery was occluded.  It was reconstituted with collaterals.  The common femoral artery had moderate diffuse disease.  The right common femoral artery had mild disease.  The right superficial femoral artery was occluded and reconstituted with brisk profunda collaterals.  He underwent stenting at the left common iliac artery.  SFA occlusions were treated medically.    He had a Holter monitor done for 48 hours on 3/11/2021 which showed 7% PACs with 36 runs of nonsustained atrial tachycardia, longest lasting 43 beats, rare PVCs were noted and no VT, no pauses or bradycardia.     Echo done 22 showed LVEF 64% with grade 1 diastolic dysfunction    He saw Dr. Villatoro  8/5/2024.  He has been following him for his PAD and recurrent stasis ulcer left ankle with cellulitis.  He was placed in a Unna boot at the visit and he was looking into Varithena injections.     He last saw Dr. Kam 8/8/2024.  He was overall stable and no changes were made at that time.    9/23/2024 he did undergo endovenous chemical ablation to multiple veins in the left leg with Dr. Villatoro.  10/25/2024 he underwent left iliac artery angioplasty with external and common iliac stent placement.  12/13/2024 he completed FARNAZ which showed moderately reduced FARNAZ bilaterally.     Today Derik presents for follow-up.  He continues to work full-time in maintenance.  He is often doing heavy lifting and exertional activities.  He feels over the last few months he has had increased shortness of breath with his activity.  He continues to smoke about half pack a day.  He has chronic dizziness that he feels is related to vertigo and is stable and unchanged.  There has been left leg numbness that comes and goes he thinks it may be occurring a little more often he and Dr. Villatoro have been following this.  He has no chest pain or pressure.  There has not been any presyncope/syncope.  Because of his PAD and PVD he has chronic lower extremity mild edema that he feels is stable.  No heart racing or palpitations.  He is hoping to refill his apixaban from a [place] pharmacy which is more cost effective for him and he will need a printed prescription for this today.    Current Outpatient Medications on File Prior to Visit   Medication Sig Dispense Refill    Ascorbic Acid (VITAMIN C PO) Take 1,000 mg by mouth Daily.      atorvastatin (LIPITOR) 80 MG tablet TAKE 1 TABLET BY MOUTH ONCE DAILY AT NIGHT 90 tablet 3    clopidogrel (PLAVIX) 75 MG tablet Take 1 tablet by mouth once daily (Patient taking differently: Take 1 tablet by mouth Every Night. PT STATED TO CONTINUE PER MD INSTRUCTIONS) 90 tablet 1    cyclobenzaprine (FLEXERIL) 10 MG  "tablet Take 1 tablet by mouth Every Night.      Docusate Sodium (COLACE PO) Take 1 tablet by mouth Every Morning.      doxycycline (VIBRAMYCIN) 100 MG capsule Take 1 capsule by mouth 2 (Two) Times a Day. 20 capsule 5    fluticasone (FLONASE) 50 MCG/ACT nasal spray Administer 2 sprays into the nostril(s) as directed by provider As Needed.      isosorbide mononitrate (IMDUR) 30 MG 24 hr tablet Take 2 tablets by mouth once daily 180 tablet 0    metoprolol succinate XL (TOPROL-XL) 50 MG 24 hr tablet Take 1 tablet by mouth Daily.      Multiple Vitamin (MULTIVITAMIN) tablet Take 1 tablet by mouth Daily.      nitroglycerin (NITROSTAT) 0.4 MG SL tablet DISSOLVE ONE TABLET UNDER THE TONGUE EVERY 5 MINUTES AS NEEDED FOR CHEST PAIN.  DO NOT EXCEED A TOTAL OF 3 DOSES IN 15 MINUTES 25 tablet 0    pantoprazole (Protonix) 40 MG EC tablet Take 1 tablet by mouth Daily. 60 tablet 5    sotalol (BETAPACE AF) 80 MG tablet tablet Take 1/2 (one-half) tablet by mouth twice daily 90 tablet 0    traMADol (ULTRAM) 50 MG tablet Take 2 tablets by mouth Every Night.      vitamin D3 125 MCG (5000 UT) capsule capsule Take 1 capsule by mouth Daily.      [DISCONTINUED] apixaban (ELIQUIS) 5 MG tablet tablet Take 1 tablet by mouth 2 (Two) Times a Day. 42 tablet 0     No current facility-administered medications on file prior to visit.         Procedures    ECG 12 Lead    Date/Time: 2/11/2025 9:05 AM  Performed by: Catrina Garcia APRN    Authorized by: Catrina Garcia APRN  Comparison: compared with previous ECG from 8/8/2024  Comparison to previous ECG: Nonspecific ST-T segment/wave abnormalities in leads II, 3, aVF  Rhythm: sinus rhythm  Rate: normal  BPM: 73  QRS axis: Borderline right: 83.              Objective:      Vitals:    02/11/25 0831   BP: 122/82   BP Location: Left arm   Patient Position: Sitting   Cuff Size: Adult   Pulse: 73   SpO2: 99%   Weight: 98.6 kg (217 lb 6.4 oz)   Height: 180.3 cm (70.98\")     Body mass index is 30.34 " kg/m².  Wt Readings from Last 3 Encounters:   02/11/25 98.6 kg (217 lb 6.4 oz)   12/19/24 97.9 kg (215 lb 14.4 oz)   11/14/24 94.8 kg (209 lb)         Constitutional:       Appearance: Not in distress.   Eyes:      Pupils: Pupils are equal, round, and reactive to light.   Neck:      Vascular: Carotid bruit present.      Comments: Right    Pulmonary:      Effort: Pulmonary effort is normal.      Breath sounds: Normal breath sounds.   Cardiovascular:      Normal rate. Regular rhythm.      Murmurs: There is no murmur.   Pulses:     Radial: 2+ bilaterally.     Dorsalis pedis: 1+ bilaterally.     Posterior tibial: 1+ bilaterally.  Edema:     Ankle: bilateral trace edema of the ankle.  Abdominal:      General: Bowel sounds are normal.      Palpations: Abdomen is soft.   Musculoskeletal:      Cervical back: Normal range of motion. Skin:     General: Skin is warm and dry.   Neurological:      Mental Status: Alert and oriented to person, place and time.   Psychiatric:         Speech: Speech normal.       Lab Review:   [facility] 10/18/2024 was unremarkable.    Assessment:     1. Coronary artery disease involving native coronary artery of native heart with angina pectoris    2. Mixed hyperlipidemia    3. PAD (peripheral artery disease)    4. Bruit of right carotid artery    Coronary artery disease.2013 diffuse LAD stenosis, 70% first diagonal, 30% mid circ, and 30% RCA. 2019 completed non ischemic nuclear stress test. He is having increased exertional dyspnea.   Hyperlipidemia on atorvastatin.  Overdue for lipid panel.   PAD, extensive.  Status post multiple procedures with vascular for this.  Most recently underwent left iliac and common iliac stent placement with Dr. Villatoro 10/24 and endovenous chemical ablation to multiple veins in the left leg 9/24.   Right carotid artery bruit, carotid duplex was ordered  Tobacco use.  He is advised to stop smoking.   Varicose veins with chronic venous insufficiency and history of venous stasis  ulcers.  Chronic mild bilateral ankle edema related to this is stable.   PACs.  On metoprolol  History of MRSA in leg wound.   PAF, on apixaban. NSR today.     Plan:   No medication changes were made  Stress test ordered for his increasing exertional dyspnea  Carotid duplex was ordered  Lipid panel to be done  Printed prescription for apixaban was provided as he would like to obtain this medication from a [place] pharmacy.   Return in about 6 months (around 8/11/2025) for Dr. Kam.      Thank you for allowing me to participate in this patient's care. Please call with any questions or concerns.          Dragon dictation device was utilized in this note.

## 2025-02-19 ENCOUNTER — TELEPHONE (OUTPATIENT)
Dept: CARDIOLOGY | Facility: CLINIC | Age: 75
End: 2025-02-19
Payer: MEDICARE

## 2025-02-19 RX ORDER — METOPROLOL SUCCINATE 50 MG/1
50 TABLET, EXTENDED RELEASE ORAL DAILY
Qty: 90 TABLET | Refills: 3 | Status: SHIPPED | OUTPATIENT
Start: 2025-02-19

## 2025-02-19 NOTE — TELEPHONE ENCOUNTER
NOV:08/14/2025  LOV:02/11/2025    Assessment:      1. Coronary artery disease involving native coronary artery of native heart with angina pectoris    2. Mixed hyperlipidemia    3. PAD (peripheral artery disease)    4. Bruit of right carotid artery    Coronary artery disease.2013 diffuse LAD stenosis, 70% first diagonal, 30% mid circ, and 30% RCA. 2019 completed non ischemic nuclear stress test. He is having increased exertional dyspnea.   Hyperlipidemia on atorvastatin.  Overdue for lipid panel.   PAD, extensive.  Status post multiple procedures with vascular for this.  Most recently underwent left iliac and common iliac stent placement with Dr. Villatoro 10/24 and endovenous chemical ablation to multiple veins in the left leg 9/24.   Right carotid artery bruit, carotid duplex was ordered  Tobacco use.  He is advised to stop smoking.   Varicose veins with chronic venous insufficiency and history of venous stasis ulcers.  Chronic mild bilateral ankle edema related to this is stable.   PACs.  On metoprolol  History of MRSA in leg wound.   PAF, on apixaban. NSR today.      Plan:   No medication changes were made  Stress test ordered for his increasing exertional dyspnea  Carotid duplex was ordered  Lipid panel to be done  Printed prescription for apixaban was provided as he would like to obtain this medication from a [place] pharmacy.   Return in about 6 months (around 8/11/2025) for Dr. Kam.          Need clarification if pt is supposed to be on metoprolol succinate 50 mg QD or sotalol 80 MG 1/2 TAB BID?

## 2025-02-19 NOTE — TELEPHONE ENCOUNTER
Pt taking metoprolol succinate XL 50 mg daily as well as 40 mg of sotalol a.m. and p.m.    Refill will be placed for him- thanks                     (EKG 2/11/25 rate 73 bpm and QT is not prolonged  EKG 8/8/2024 rate 60 bpm and QT is not prolonged)

## 2025-02-19 NOTE — TELEPHONE ENCOUNTER
Hub staff attempted to follow warm transfer process and was unsuccessful     Caller: IZABELLA PAUL    Relationship to patient:     Best call back number:  [phone]    Patient is needing: PATIENT HAVING A DIFFICULT TIME GETTING WARM TRANSFERRED. DID MAKE THE NOTE BELOW.          PATIENT IS TAKING METOPROLOL 1X A DAY AND SOTALOL HALF A TABLET IN THE MORNING AND HALF IN THE EVENING.

## 2025-02-19 NOTE — TELEPHONE ENCOUNTER
I called and left voicemail requesting call back to clarify medicines for refill request  is he still taking both sotalol and metoprolol?   Thanks

## 2025-02-19 NOTE — TELEPHONE ENCOUNTER
Pt returned call. He left a v/m stating that he was returning providers call. Attempted to call pt back. Left v/m to return call back to the office

## 2025-03-03 ENCOUNTER — TELEPHONE (OUTPATIENT)
Dept: CARDIOLOGY | Facility: CLINIC | Age: 75
End: 2025-03-03
Payer: MEDICARE

## 2025-03-04 ENCOUNTER — HOSPITAL ENCOUNTER (OUTPATIENT)
Dept: CARDIOLOGY | Facility: HOSPITAL | Age: 75
Discharge: HOME | End: 2025-03-04
Payer: MEDICARE

## 2025-03-04 VITALS — BODY MASS INDEX: 30.43 KG/M2 | WEIGHT: 217.37 LBS | HEIGHT: 71 IN

## 2025-03-04 DIAGNOSIS — I25.119 CORONARY ARTERY DISEASE INVOLVING NATIVE CORONARY ARTERY OF NATIVE HEART WITH ANGINA PECTORIS: ICD-10-CM

## 2025-03-04 DIAGNOSIS — I73.9 PAD (PERIPHERAL ARTERY DISEASE): ICD-10-CM

## 2025-03-04 LAB
BH CV NUCLEAR PRIOR STUDY: 1
BH CV REST NUCLEAR ISOTOPE DOSE: 25.9 MCI
BH CV STRESS BP STAGE 1: NORMAL
BH CV STRESS COMMENTS STAGE 1: NORMAL
BH CV STRESS DOSE REGADENOSON STAGE 1: 0.4
BH CV STRESS DURATION MIN STAGE 1: 0
BH CV STRESS DURATION SEC STAGE 1: 10
BH CV STRESS HR STAGE 1: 75
BH CV STRESS NUCLEAR ISOTOPE DOSE: 25.9 MCI
BH CV STRESS PROTOCOL 1: NORMAL
BH CV STRESS RECOVERY BP: NORMAL MMHG
BH CV STRESS RECOVERY HR: 63 BPM
BH CV STRESS STAGE 1: 1
MAXIMAL PREDICTED HEART RATE: 146 BPM
PERCENT MAX PREDICTED HR: 51.37 %
SPECT HRT GATED+EF W RNC IV: 68 %
STRESS BASELINE BP: NORMAL MMHG
STRESS BASELINE HR: 63 BPM
STRESS PERCENT HR: 60 %
STRESS POST EXERCISE DUR SEC: 10 SEC
STRESS POST PEAK BP: NORMAL MMHG
STRESS POST PEAK HR: 75 BPM
STRESS TARGET HR: 124 BPM

## 2025-03-04 PROCEDURE — 78492 MYOCRD IMG PET MLT RST&STRS: CPT

## 2025-03-04 PROCEDURE — 34310000005 RUBIDIUM CHLORIDE: Performed by: FAMILY MEDICINE

## 2025-03-04 PROCEDURE — 93018 CV STRESS TEST I&R ONLY: CPT | Performed by: INTERNAL MEDICINE

## 2025-03-04 PROCEDURE — A9555 RB82 RUBIDIUM: HCPCS | Performed by: FAMILY MEDICINE

## 2025-03-04 PROCEDURE — 78492 MYOCRD IMG PET MLT RST&STRS: CPT | Performed by: INTERNAL MEDICINE

## 2025-03-04 PROCEDURE — 25010000002 REGADENOSON 0.4 MG/5ML SOLUTION: Performed by: FAMILY MEDICINE

## 2025-03-04 PROCEDURE — 93017 CV STRESS TEST TRACING ONLY: CPT

## 2025-03-04 PROCEDURE — 93016 CV STRESS TEST SUPVJ ONLY: CPT | Performed by: INTERNAL MEDICINE

## 2025-03-04 RX ORDER — REGADENOSON 0.08 MG/ML
0.4 INJECTION, SOLUTION INTRAVENOUS
Status: COMPLETED | OUTPATIENT
Start: 2025-03-04 | End: 2025-03-04

## 2025-03-04 RX ADMIN — REGADENOSON 0.4 MG: 0.08 INJECTION, SOLUTION INTRAVENOUS at 08:55

## 2025-03-06 ENCOUNTER — HOSPITAL ENCOUNTER (OUTPATIENT)
Dept: CARDIOLOGY | Facility: HOSPITAL | Age: 75
Discharge: HOME | End: 2025-03-06
Admitting: FAMILY MEDICINE
Payer: MEDICARE

## 2025-03-06 ENCOUNTER — TELEPHONE (OUTPATIENT)
Dept: CARDIOLOGY | Facility: CLINIC | Age: 75
End: 2025-03-06
Payer: MEDICARE

## 2025-03-06 DIAGNOSIS — I25.119 CORONARY ARTERY DISEASE INVOLVING NATIVE CORONARY ARTERY OF NATIVE HEART WITH ANGINA PECTORIS: ICD-10-CM

## 2025-03-06 DIAGNOSIS — I73.9 PAD (PERIPHERAL ARTERY DISEASE): ICD-10-CM

## 2025-03-06 DIAGNOSIS — R09.89 BRUIT OF RIGHT CAROTID ARTERY: ICD-10-CM

## 2025-03-06 LAB
BH CV XLRA MEAS LEFT DIST CCA EDV: 20.1 CM/SEC
BH CV XLRA MEAS LEFT DIST CCA PSV: 63.4 CM/SEC
BH CV XLRA MEAS LEFT DIST ICA EDV: -32.9 CM/SEC
BH CV XLRA MEAS LEFT DIST ICA PSV: -62.9 CM/SEC
BH CV XLRA MEAS LEFT ICA/CCA RATIO: -1.19
BH CV XLRA MEAS LEFT MID ICA EDV: -37.8 CM/SEC
BH CV XLRA MEAS LEFT MID ICA PSV: -75.7 CM/SEC
BH CV XLRA MEAS LEFT PROX CCA EDV: 19.3 CM/SEC
BH CV XLRA MEAS LEFT PROX CCA PSV: 70.8 CM/SEC
BH CV XLRA MEAS LEFT PROX ECA EDV: -21.1 CM/SEC
BH CV XLRA MEAS LEFT PROX ECA PSV: -102.5 CM/SEC
BH CV XLRA MEAS LEFT PROX ICA EDV: -26.5 CM/SEC
BH CV XLRA MEAS LEFT PROX ICA PSV: -68.3 CM/SEC
BH CV XLRA MEAS LEFT PROX SCLA PSV: 84.5 CM/SEC
BH CV XLRA MEAS LEFT VERTEBRAL A EDV: 12.5 CM/SEC
BH CV XLRA MEAS LEFT VERTEBRAL A PSV: 31 CM/SEC
BH CV XLRA MEAS RIGHT DIST CCA EDV: 18.9 CM/SEC
BH CV XLRA MEAS RIGHT DIST CCA PSV: 58.8 CM/SEC
BH CV XLRA MEAS RIGHT DIST ICA EDV: -19.8 CM/SEC
BH CV XLRA MEAS RIGHT DIST ICA PSV: -69.8 CM/SEC
BH CV XLRA MEAS RIGHT ICA/CCA RATIO: -1.19
BH CV XLRA MEAS RIGHT MID ICA EDV: -24.1 CM/SEC
BH CV XLRA MEAS RIGHT MID ICA PSV: -61.5 CM/SEC
BH CV XLRA MEAS RIGHT PROX CCA EDV: 21.1 CM/SEC
BH CV XLRA MEAS RIGHT PROX CCA PSV: 82.6 CM/SEC
BH CV XLRA MEAS RIGHT PROX ECA EDV: -25.1 CM/SEC
BH CV XLRA MEAS RIGHT PROX ECA PSV: -125.6 CM/SEC
BH CV XLRA MEAS RIGHT PROX ICA EDV: -22 CM/SEC
BH CV XLRA MEAS RIGHT PROX ICA PSV: -63.7 CM/SEC
BH CV XLRA MEAS RIGHT PROX SCLA PSV: 106.2 CM/SEC
BH CV XLRA MEAS RIGHT VERTEBRAL A EDV: 13.3 CM/SEC
BH CV XLRA MEAS RIGHT VERTEBRAL A PSV: 40.5 CM/SEC

## 2025-03-06 PROCEDURE — 93880 EXTRACRANIAL BILAT STUDY: CPT

## 2025-03-06 NOTE — TELEPHONE ENCOUNTER
Carotid artery show bilateral plaque with mild stenosis    Continue atorvastatin and clopidogrel no other changes are indicated at this time

## 2025-03-07 NOTE — TELEPHONE ENCOUNTER
I spoke with Derik Borja and updated pt on results/recommendations from provider.  Pt verbalized understanding and has no further questions at this time.    Rosetta MORENO RN  Triage [facility]  03/07/25 09:48 EST

## 2025-03-13 RX ORDER — SOTALOL HYDROCHLORIDE 80 MG/1
40 TABLET ORAL 2 TIMES DAILY
Qty: 90 TABLET | Refills: 0 | Status: SHIPPED | OUTPATIENT
Start: 2025-03-13

## 2025-03-14 RX ORDER — CLOPIDOGREL BISULFATE 75 MG/1
75 TABLET ORAL DAILY
Qty: 90 TABLET | Refills: 1 | Status: SHIPPED | OUTPATIENT
Start: 2025-03-14

## 2025-04-07 RX ORDER — ISOSORBIDE MONONITRATE 30 MG/1
60 TABLET, EXTENDED RELEASE ORAL DAILY
Qty: 180 TABLET | Refills: 1 | Status: SHIPPED | OUTPATIENT
Start: 2025-04-07

## 2025-04-07 NOTE — TELEPHONE ENCOUNTER
NOV-08/14/25-  LOV-02/11/25-EE    Coronary artery disease.2013 diffuse LAD stenosis, 70% first diagonal, 30% mid circ, and 30% RCA. 2019 completed non ischemic nuclear stress test. He is having increased exertional dyspnea.   Hyperlipidemia on atorvastatin.  Overdue for lipid panel.   PAD, extensive.  Status post multiple procedures with vascular for this.  Most recently underwent left iliac and common iliac stent placement with Dr. Villatoro 10/24 and endovenous chemical ablation to multiple veins in the left leg 9/24.   Right carotid artery bruit, carotid duplex was ordered  Tobacco use.  He is advised to stop smoking.   Varicose veins with chronic venous insufficiency and history of venous stasis ulcers.  Chronic mild bilateral ankle edema related to this is stable.   PACs.  On metoprolol  History of MRSA in leg wound.   PAF, on apixaban. NSR today.      Plan:   No medication changes were made  Stress test ordered for his increasing exertional dyspnea  Carotid duplex was ordered  Lipid panel to be done  Printed prescription for apixaban was provided as he would like to obtain this medication from a [place] pharmacy.   Return in about 6 months (around 8/11/2025) for Dr. Kam.  
Yes

## 2025-06-10 RX ORDER — SOTALOL HYDROCHLORIDE 80 MG/1
40 TABLET ORAL 2 TIMES DAILY
Qty: 90 TABLET | Refills: 0 | Status: SHIPPED | OUTPATIENT
Start: 2025-06-10

## 2025-06-12 RX ORDER — PREDNISONE 50 MG/1
TABLET ORAL
Qty: 3 TABLET | Refills: 0 | Status: SHIPPED | OUTPATIENT
Start: 2025-06-12

## 2025-06-16 ENCOUNTER — HOSPITAL ENCOUNTER (OUTPATIENT)
Dept: CT IMAGING | Facility: HOSPITAL | Age: 75
Discharge: HOME | End: 2025-06-16
Admitting: SURGERY
Payer: MEDICARE

## 2025-06-16 DIAGNOSIS — K76.9 LESION OF LIVER: ICD-10-CM

## 2025-06-16 PROCEDURE — 74160 CT ABDOMEN W/CONTRAST: CPT

## 2025-06-16 PROCEDURE — 25510000001 IOPAMIDOL 61 % SOLUTION: Performed by: SURGERY

## 2025-06-16 RX ORDER — IOPAMIDOL 612 MG/ML
100 INJECTION, SOLUTION INTRAVASCULAR
Status: COMPLETED | OUTPATIENT
Start: 2025-06-16 | End: 2025-06-16

## 2025-06-16 RX ADMIN — IOPAMIDOL 85 ML: 612 INJECTION, SOLUTION INTRAVENOUS at 08:45

## 2025-06-20 ENCOUNTER — RESULTS FOLLOW-UP (OUTPATIENT)
Dept: SURGERY | Facility: CLINIC | Age: 75
End: 2025-06-20
Payer: MEDICARE

## 2025-06-26 ENCOUNTER — OFFICE VISIT (OUTPATIENT)
Age: 75
End: 2025-06-26
Payer: MEDICARE

## 2025-06-26 ENCOUNTER — HOSPITAL ENCOUNTER (OUTPATIENT)
Facility: HOSPITAL | Age: 75
Discharge: HOME | End: 2025-06-26
Payer: MEDICARE

## 2025-06-26 VITALS
SYSTOLIC BLOOD PRESSURE: 98 MMHG | BODY MASS INDEX: 28.31 KG/M2 | DIASTOLIC BLOOD PRESSURE: 64 MMHG | HEART RATE: 74 BPM | HEIGHT: 71 IN | WEIGHT: 202.2 LBS

## 2025-06-26 DIAGNOSIS — I70.212 ATHEROSCLEROSIS OF NATIVE ARTERY OF LEFT LOWER EXTREMITY WITH INTERMITTENT CLAUDICATION: ICD-10-CM

## 2025-06-26 DIAGNOSIS — I70.299 ATHEROSCLEROSIS OF ARTERY OF EXTREMITY WITH ULCERATION: ICD-10-CM

## 2025-06-26 DIAGNOSIS — I87.8 VENOUS STASIS: Primary | ICD-10-CM

## 2025-06-26 DIAGNOSIS — L97.909 ATHEROSCLEROSIS OF ARTERY OF EXTREMITY WITH ULCERATION: ICD-10-CM

## 2025-06-26 DIAGNOSIS — M54.31 SCIATICA, RIGHT SIDE: ICD-10-CM

## 2025-06-26 DIAGNOSIS — I70.0 ATHEROSCLEROSIS OF AORTA: ICD-10-CM

## 2025-06-26 DIAGNOSIS — I87.2 VENOUS INSUFFICIENCY: ICD-10-CM

## 2025-06-26 DIAGNOSIS — I73.9 PERIPHERAL VASCULAR DISEASE: ICD-10-CM

## 2025-06-26 DIAGNOSIS — I73.9 PAD (PERIPHERAL ARTERY DISEASE): ICD-10-CM

## 2025-06-26 DIAGNOSIS — I87.302 VENOUS HYPERTENSION OF LEFT LOWER EXTREMITY: ICD-10-CM

## 2025-06-26 DIAGNOSIS — I70.213 ATHEROSCLEROSIS OF NATIVE ARTERY OF BOTH LOWER EXTREMITIES WITH INTERMITTENT CLAUDICATION: ICD-10-CM

## 2025-06-26 LAB
ABDOMINAL DIST AORTA AP: 1.5 CM
ABDOMINAL DIST AORTA TRANS: 1.4 CM
ABDOMINAL DIST AORTA VEL: 59.3 CM/S
ABDOMINAL LT COM ILIAC AP: 1.2 CM
ABDOMINAL LT COM ILIAC TRANS: 0.9 CM
ABDOMINAL LT COM ILIAC VEL: 212 CM/S
ABDOMINAL LT EXT ILIAC VEL: 243 CM/S
ABDOMINAL MID AORTA AP: 1.7 CM
ABDOMINAL MID AORTA TRANS: 1.6 CM
ABDOMINAL MID AORTA VEL: 76.6 CM/S
ABDOMINAL PROX AORTA AP: 2 CM
ABDOMINAL PROX AORTA TRANS: 2 CM
ABDOMINAL PROX AORTA VEL: 62.9 CM/S
ABDOMINAL RT COM ILIAC AP: 0.8 CM
ABDOMINAL RT COM ILIAC TRANS: 1 CM
ABDOMINAL RT COM ILIAC VEL: 199 CM/S
ABDOMINAL RT EXT ILIAC VEL: 148 CM/S
BH CV LOWER ARTERIAL LEFT ABI RATIO: 0.76
BH CV LOWER ARTERIAL LEFT DORSALIS PEDIS SYS MAX: 70
BH CV LOWER ARTERIAL LEFT POST TIBIAL SYS MAX: 76
BH CV LOWER ARTERIAL RIGHT ABI RATIO: 0.74
BH CV LOWER ARTERIAL RIGHT DORSALIS PEDIS SYS MAX: 68
BH CV LOWER ARTERIAL RIGHT POST TIBIAL SYS MAX: 74
BH CV VAS ABD AO LT EXTERNAL ILIAC AP: 0.7 CM
BH CV VAS ABD AO RT EXTERNAL ILIAC AP: 1 CM
BH CV VAS DIALYSIS STENT ONE  DIST STENT EDV: 27.5 CM/SEC
BH CV VAS DIALYSIS STENT ONE  DIST STENT PSV: 212 CM/SEC
BH CV VAS DIALYSIS STENT ONE  POST STENT EDV: 19.6 CM/SEC
BH CV VAS DIALYSIS STENT ONE MID STENT PSV: 108 CM/SEC
BH CV VAS DIALYSIS STENT ONE POST STENT PSV: 119 CM/SEC
BH CV VAS DIALYSIS STENT ONE PRE STENT PSV: 59.3 CM/SEC
BH CV VAS DIALYSIS STENT ONE PROX STENT PSV: 109 CM/SEC
BH CV VAS FREE TEXT STENT ONE: NORMAL
UPPER ARTERIAL LEFT ARM BRACHIAL SYS MAX: 98
UPPER ARTERIAL RIGHT ARM BRACHIAL SYS MAX: 100

## 2025-06-26 PROCEDURE — 93922 UPR/L XTREMITY ART 2 LEVELS: CPT

## 2025-06-26 PROCEDURE — 93979 VASCULAR STUDY: CPT

## 2025-06-26 NOTE — PROGRESS NOTES
Patient Name: Derik Borja    MRN: [id number] Encounter Date: 06/26/2025      Consulting Service: Vascular Surgery    Referring Provider: No ref. provider found       CHIEF COMPLAINT:  Chief Complaint   Patient presents with    Atherosclerosis of artery of extremity        Subjective    HPI: Derik Borja is a 75 y.o. male being seen for evaluation/management of vein intervention.  Patient presents today for long-term follow-up of  EVLA procedure on the left.  The patient's symptoms have improved status post procedure.  Current varicose veins have diminished in diameter.  Patient denies issues such as numbness, phlebitis or brown staining.  Based on current condition it appears the patient is stable for as needed follow-up.                                                                                                                                                                                                                                                     Patient is also being seen for evaluation/management of peripheral vascular disease follow-up.  The patient has known peripheral vascular disease with symptoms of claudication.  Current symptoms of claudication at 4 blocks distance are noted without lifestyle limitation.  Prior interventions include peripheral endovascular intervention.  Currently the patient reports symptoms are stable.  Testing today includes ABIs and graft scans.  Their current medical regimen includes anticoagulation medications.  The patient has significant peripheral neuropathy.  Discussion as to the protection of feet and toes with prevention of injury including twice a day monitoring of feet and all digits, avoidance of walking barefoot, and the use of lightly colored socks to note any drainage were reviewed with the patient.  Plan moving forward will include continue to follow-up with noninvasive testing.    PAST MEDICAL HISTORY:   Past Medical History:   Diagnosis Date    Afib  07/06/2023    APC (atrial premature contractions) 07/21/2023    1.5% APC burden noted on Holter monitor 7/2023    Arthritis     Atherosclerosis of native arteries of the extremities with ulceration 03/31/2022    PVD-right calf    Atherosclerosis of native arteries of the extremities with ulceration     PVD-left calf    Atherosclerosis of native arteries of the extremities with ulceration 04/10/2023    other part of foot    Bunion of right foot 06/19/2023    CAD (coronary artery disease)     3/13    Cellulitis of left lower extremity 01/05/2023    Cellulitis of right lower extremity 01/05/2023    COPD (chronic obstructive pulmonary disease)     Foot pain, right 04/10/2023    History of MRSA infection 2021    LEFT LEG    [place] (hard of hearing)     Hyperlipidemia     PAD (peripheral artery disease)     PAF (paroxysmal atrial fibrillation) 07/2023    Pneumonia 05/20/2019    PVC (premature ventricular contraction)     Sinus pause 07/21/2023    For postconversion pauses noted after atrial fibrillation on Holter monitor 7/2023    Varicose veins of bilateral lower extremities with other complications 03/31/2022    Varicose veins of left lower extremity with inflammation 07/06/2022    Venous insufficiency (chronic) (peripheral) 03/31/2022    Venous ulcer 03/31/2022    Wound of foot     LEFT/SURGEON AWARE      PAST SURGICAL HISTORY:   Past Surgical History:   Procedure Laterality Date    ANGIOPLASTY ILIAC ARTERY Left 10/25/2024    Procedure: LEFT ILIAC ARTERY ANGIOPLASTY WITH EXTERNAL AND COMMON ILIAC STENT;  Surgeon: Sukh Villatoro MD;  Location: [facility] HYBRID OR;  Service: Vascular;  Laterality: Left;    COLONOSCOPY N/A 11/14/2024    Procedure: COLONOSCOPY to ascending colon with cold bx/hot snare polypectomies;  Surgeon: Yanira Ballard MD;  Location: [facility] ENDOSCOPY;  Service: General;  Laterality: N/A;  pre: change in bowel habits, constipation   post: poor prep, polyps    ENDOSCOPY N/A 11/14/2024    Procedure:  ESOPHAGOGASTRODUODENOSCOPY with bx;  Surgeon: Yanira Ballard MD;  Location: [facility] ENDOSCOPY;  Service: General;  Laterality: N/A;  pre: dysphagia  post: gastritis, gastric ulcer, duodenitis    ILIAC ARTERY STENT  03/2013    PERIPHERAL ARTERIAL STENT GRAFT      about 9 years ago    SHOULDER SURGERY  2013    VARICOSE VEIN SURGERY Left 05/02/2022    Left GSV Venaseal Ablation    VARICOSE VEIN SURGERY Left 08/01/2022    Left Tributary Vein Varithena ablation    VARICOSE VEIN SURGERY Left 09/23/2024    Left VV Varithena    VENOUS ABLATION CHEMICAL  09/23/2024      FAMILY HISTORY:   Family History   Problem Relation Age of Onset    Stroke Mother     Heart disease Mother     Emphysema Father     Heart disease Brother     Kidney cancer Brother     Malig Hyperthermia Neg Hx       SOCIAL HISTORY:   Social History     Tobacco Use    Smoking status: Every Day     Current packs/day: 0.50     Average packs/day: 0.5 packs/day for 35.0 years (17.5 ttl pk-yrs)     Types: Cigarettes     Passive exposure: Current    Smokeless tobacco: Never    Tobacco comments:     caffeine use - 3 cups coffee daily     Pt smokes 5-6 cigarettes a day    Vaping Use    Vaping status: Never Used   Substance Use Topics    Alcohol use: Yes     Comment: once a month - rarely    Drug use: Yes     Types: Marijuana     Comment: 2-3 X PER WEEK      MEDICATIONS:   Current Outpatient Medications on File Prior to Visit   Medication Sig Dispense Refill    apixaban (ELIQUIS) 5 MG tablet tablet Take 1 tablet by mouth 2 (Two) Times a Day. 180 tablet 3    Ascorbic Acid (VITAMIN C PO) Take 1,000 mg by mouth Daily.      atorvastatin (LIPITOR) 80 MG tablet TAKE 1 TABLET BY MOUTH ONCE DAILY AT NIGHT 90 tablet 3    clopidogrel (PLAVIX) 75 MG tablet Take 1 tablet by mouth once daily 90 tablet 1    cyclobenzaprine (FLEXERIL) 10 MG tablet Take 1 tablet by mouth Every Night.      Docusate Sodium (COLACE PO) Take 1 tablet by mouth Every Morning.      doxycycline  "(VIBRAMYCIN) 100 MG capsule Take 1 capsule by mouth 2 (Two) Times a Day. 20 capsule 5    fluticasone (FLONASE) 50 MCG/ACT nasal spray Administer 2 sprays into the nostril(s) as directed by provider As Needed.      isosorbide mononitrate (IMDUR) 30 MG 24 hr tablet Take 2 tablets by mouth once daily 180 tablet 1    metoprolol succinate XL (TOPROL-XL) 50 MG 24 hr tablet Take 1 tablet by mouth Daily. 90 tablet 3    Multiple Vitamin (MULTIVITAMIN) tablet Take 1 tablet by mouth Daily.      nitroglycerin (NITROSTAT) 0.4 MG SL tablet DISSOLVE ONE TABLET UNDER THE TONGUE EVERY 5 MINUTES AS NEEDED FOR CHEST PAIN.  DO NOT EXCEED A TOTAL OF 3 DOSES IN 15 MINUTES 25 tablet 0    pantoprazole (Protonix) 40 MG EC tablet Take 1 tablet by mouth Daily. 60 tablet 5    predniSONE (DELTASONE) 50 MG tablet Take 1 tablet at 13 hours, 7 hours, and 1 hour prior to your CT scan 3 tablet 0    sotalol (BETAPACE AF) 80 MG tablet tablet Take 1/2 (one-half) tablet by mouth twice daily 90 tablet 0    traMADol (ULTRAM) 50 MG tablet Take 2 tablets by mouth Every Night.      vitamin D3 125 MCG (5000 UT) capsule capsule Take 1 capsule by mouth Daily.       No current facility-administered medications on file prior to visit.       ALLERGIES: Iodinated contrast media, Iodine, and Penicillins       Objective   Vitals:    06/26/25 0837   BP: 98/64   Pulse: 74   Weight: 91.7 kg (202 lb 3.2 oz)   Height: 180.3 cm (70.98\")     Body mass index is 28.22 kg/m².  BMI is >= 25 and <30. (Overweight) The following options were offered after discussion;: weight loss educational material (shared in after visit summary), exercise counseling/recommendations, and Information on healthy weight added to patient's after visit summary.      PHYSICAL EXAM:   Physical Exam  Constitutional:       Appearance: Normal appearance. He is normal weight.   HENT:      Head: Normocephalic and atraumatic.      Nose: Nose normal.   Eyes:      Extraocular Movements: Extraocular movements " intact.      Pupils: Pupils are equal, round, and reactive to light.   Cardiovascular:      Rate and Rhythm: Normal rate.      Pulses:           Carotid pulses are 2+ on the right side and 2+ on the left side.       Radial pulses are 2+ on the right side and 2+ on the left side.        Femoral pulses are 2+ on the right side and 2+ on the left side.       Popliteal pulses are 0 on the right side and 0 on the left side.        Dorsalis pedis pulses are detected w/ Doppler on the right side and detected w/ Doppler on the left side.        Posterior tibial pulses are detected w/ Doppler on the right side and detected w/ Doppler on the left side.      Heart sounds: Normal heart sounds.      Comments: Mild stasis injury and crusted areas but no open ulcers.  Some forefoot swelling on the left but otherwise swelling well-controlled with compression  Pulmonary:      Effort: Pulmonary effort is normal.      Breath sounds: Normal breath sounds.   Abdominal:      General: Abdomen is flat. Bowel sounds are normal.      Palpations: Abdomen is soft.   Musculoskeletal:         General: Normal range of motion.      Cervical back: Normal range of motion and neck supple.      Right lower le+ Edema present.      Left lower le+ Edema present.   Skin:     General: Skin is warm and dry.   Neurological:      General: No focal deficit present.      Mental Status: He is alert and oriented to person, place, and time. Mental status is at baseline.   Psychiatric:         Mood and Affect: Mood normal.         Thought Content: Thought content normal.          Result Review   LABS:    CBC          10/18/2024    07:26   CBC   WBC 7.87    RBC 4.07    Hemoglobin 13.3    Hematocrit 39.7    MCV 97.5    MCH 32.7    MCHC 33.5    RDW 11.8    Platelets 162      BMP          10/18/2024    07:26   BMP   BUN 16    Creatinine 0.96    Sodium 138    Potassium 4.1    Chloride 105    CO2 26.5    Calcium 9.5                 Results Review:       I  reviewed the patient's new clinical results.    The following radiologic or non-invasive studies have been reviewed by me: ABIs 0.74 on the right and 0.76 on the left with patent left common iliac artery stent with mild stenosis on study.  Duplex Carotid Bilateral CAR 03/06/2025    Interpretation Summary    Right internal carotid artery demonstrates a less than 50% stenosis.    Antegrade right vertebral flow.    Left internal carotid artery demonstrates a less than 50% stenosis.    Antegrade left vertebral flow.     CT Abdomen With Contrast  Result Date: 6/19/2025  1. Hepatic low-density lesions of which the larger represent cysts and the smaller are too small to characterize without evidence for change compared to previous CT angiogram 10/03/2024. 2. No evidence for acute abnormality in the abdomen. 3. Atherosclerotic disease with mild narrowing of the superior mesenteric artery. Coronary arterial calcifications.  Radiation dose reduction techniques were utilized, including automated exposure control and exposure modulation based on body size.   This report was finalized on 6/19/2025 12:15 PM by Clyde Morgan M.D on Workstation: [id number]                    ASSESSMENT/PLAN:   Diagnoses and all orders for this visit:    1. Venous stasis (Primary)    2. Venous insufficiency    3. Venous hypertension of left lower extremity    4. Peripheral vascular disease    5. Atherosclerosis of native artery of both lower extremities with intermittent claudication  -     Doppler Ankle Brachial Index Single Level CAR; Future    6. Atherosclerosis of artery of extremity with ulceration    7. Sciatica, right side  -     XR Spine Lumbar 4+ View; Future       75 y.o. male with widely patent left common iliac artery stent and moderate occlusive disease of both legs stable with decent waveforms.  His ulcers which are primarily venous stasis in nature of improved and are stable and controlled with him using 20 to 30 mm tor covered by  a 10 mm tor compression stocking he is tolerating all of this and at this point I am very pleased.  We will go another 6 months on his checkup and recheck his ABIs.  Will check his carotids 80-year-old we do his next iliac stent scan but I do not think he needs that in the next 6 months.  Will check just his ABIs in 6 months I do not think we need to check his iliac stent unless the ABIs dropped this next time we will check it yearly.    I discussed the plan with the patient who is agreeable to the plan of care at this point. Thank you for this consult.   Follow Up  Return in about 6 months (around 12/26/2025).    Sukh Villatoro MD   06/26/25

## 2025-07-10 RX ORDER — ATORVASTATIN CALCIUM 80 MG/1
80 TABLET, FILM COATED ORAL NIGHTLY
Qty: 90 TABLET | Refills: 0 | Status: SHIPPED | OUTPATIENT
Start: 2025-07-10

## 2025-08-14 ENCOUNTER — OFFICE VISIT (OUTPATIENT)
Dept: CARDIOLOGY | Age: 75
End: 2025-08-14
Payer: MEDICARE

## 2025-08-14 VITALS
BODY MASS INDEX: 28.22 KG/M2 | SYSTOLIC BLOOD PRESSURE: 118 MMHG | DIASTOLIC BLOOD PRESSURE: 70 MMHG | HEIGHT: 71 IN | HEART RATE: 68 BPM | WEIGHT: 201.6 LBS

## 2025-08-14 DIAGNOSIS — Z72.0 TOBACCO USE: ICD-10-CM

## 2025-08-14 DIAGNOSIS — I48.0 PAF (PAROXYSMAL ATRIAL FIBRILLATION): ICD-10-CM

## 2025-08-14 DIAGNOSIS — E78.2 MIXED HYPERLIPIDEMIA: ICD-10-CM

## 2025-08-14 DIAGNOSIS — I73.9 PAD (PERIPHERAL ARTERY DISEASE): Primary | ICD-10-CM

## 2025-08-14 PROCEDURE — 93000 ELECTROCARDIOGRAM COMPLETE: CPT | Performed by: INTERNAL MEDICINE

## 2025-08-14 PROCEDURE — 99214 OFFICE O/P EST MOD 30 MIN: CPT | Performed by: INTERNAL MEDICINE

## 2025-08-14 PROCEDURE — 1159F MED LIST DOCD IN RCRD: CPT | Performed by: INTERNAL MEDICINE

## 2025-08-14 PROCEDURE — 1160F RVW MEDS BY RX/DR IN RCRD: CPT | Performed by: INTERNAL MEDICINE

## (undated) DEVICE — CATH ANGIO TRCN NB BCN .038 5F 65CM RIM

## (undated) DEVICE — RADIFOCUS TORQUE DEVICE MULTI-TORQUE VISE: Brand: RADIFOCUS TORQUE DEVICE

## (undated) DEVICE — RADIFOCUS GLIDEWIRE: Brand: GLIDEWIRE

## (undated) DEVICE — PERCLOSE™ PROSTYLE™ SUTURE-MEDIATED CLOSURE AND REPAIR SYSTEM: Brand: PERCLOSE™ PROSTYLE™

## (undated) DEVICE — ARMADA 35 PTA CATHETER 7 MM X 40 MM X 80 CM / OVER-THE-WIRE: Brand: ARMADA

## (undated) DEVICE — SYRINGE KIT,PACKAGED,,150FT,MK 7(ANGIO-ARTERION, 150ML SYR KIT W/QFT,MC)(60729385): Brand: MEDRAD® MARK 7 ARTERION DISPOSABLE SYRINGE 150 ML WITH QUICK FILL TUBE

## (undated) DEVICE — SYR LUERLOK 30CC

## (undated) DEVICE — RADIFOCUS GLIDEWIRE ADVANTAGE GUIDEWIRE: Brand: GLIDEWIRE ADVANTAGE

## (undated) DEVICE — ADAPT CLN BIOGUARD AIR/H2O DISP

## (undated) DEVICE — THE SINGLE USE ETRAP – POLYP TRAP IS USED FOR SUCTION RETRIEVAL OF ENDOSCOPICALLY REMOVED POLYPS.: Brand: ETRAP

## (undated) DEVICE — CANN O2 ETCO2 FITS ALL CONN CO2 SMPL A/ 7IN DISP LF

## (undated) DEVICE — GLV SURG BIOGEL LTX PF 7 1/2

## (undated) DEVICE — SYR LUERLOK 5CC

## (undated) DEVICE — BLCK/BITE BLOX W/DENTL/RIM W/STRAP 54F

## (undated) DEVICE — CATH GUIDE SOFTVU FLUSH HT PIG .035 5F 65CM

## (undated) DEVICE — SYR LUERLOK 20CC BX/50

## (undated) DEVICE — PK ANGIO 40

## (undated) DEVICE — FRCP BX RADJAW4 NDL 2.8 240CM LG OG BX40

## (undated) DEVICE — GOWN,SIRUS,NON REINFRCD,LARGE,SET IN SL: Brand: MEDLINE

## (undated) DEVICE — KT ORCA ORCAPOD DISP STRL

## (undated) DEVICE — DESTINATION RENAL GUIDING SHEATH: Brand: DESTINATION

## (undated) DEVICE — TUBING, SUCTION, 1/4" X 10', STRAIGHT: Brand: MEDLINE

## (undated) DEVICE — SOL NACL 0.9PCT 1000ML

## (undated) DEVICE — SNAR POLYP SENSATION STDOVL 27 240 BX40

## (undated) DEVICE — CVR PROB 96IN LF STRL

## (undated) DEVICE — SENSR O2 OXIMAX FNGR A/ 18IN NONSTR

## (undated) DEVICE — LN SMPL CO2 SHTRM SD STREAM W/M LUER

## (undated) DEVICE — ST ACC MICROPUNCTURE STFF .018 ECHO/PLAT/TP 4F/10CM 21G/7CM

## (undated) DEVICE — NAVICROSS SUPPORT CATHETER: Brand: NAVICROSS

## (undated) DEVICE — INFLATION DEVICE: Brand: ENCORE™ 26